# Patient Record
Sex: MALE | Race: WHITE | NOT HISPANIC OR LATINO
[De-identification: names, ages, dates, MRNs, and addresses within clinical notes are randomized per-mention and may not be internally consistent; named-entity substitution may affect disease eponyms.]

---

## 2017-02-27 ENCOUNTER — MEDICATION RENEWAL (OUTPATIENT)
Age: 62
End: 2017-02-27

## 2017-03-01 ENCOUNTER — NON-APPOINTMENT (OUTPATIENT)
Age: 62
End: 2017-03-01

## 2017-03-01 ENCOUNTER — APPOINTMENT (OUTPATIENT)
Dept: CARDIOLOGY | Facility: CLINIC | Age: 62
End: 2017-03-01

## 2017-03-01 VITALS
WEIGHT: 236 LBS | DIASTOLIC BLOOD PRESSURE: 84 MMHG | TEMPERATURE: 98.3 F | HEIGHT: 70 IN | HEART RATE: 57 BPM | OXYGEN SATURATION: 95 % | SYSTOLIC BLOOD PRESSURE: 138 MMHG | BODY MASS INDEX: 33.79 KG/M2

## 2017-03-06 ENCOUNTER — APPOINTMENT (OUTPATIENT)
Dept: CARDIOLOGY | Facility: CLINIC | Age: 62
End: 2017-03-06

## 2017-03-06 ENCOUNTER — MED ADMIN CHARGE (OUTPATIENT)
Age: 62
End: 2017-03-06

## 2017-03-06 RX ORDER — REGADENOSON 0.08 MG/ML
0.4 INJECTION, SOLUTION INTRAVENOUS
Qty: 1 | Refills: 0 | Status: COMPLETED | OUTPATIENT
Start: 2017-03-06

## 2017-03-06 RX ADMIN — REGADENOSON 0.4 MG/5ML: 0.08 INJECTION, SOLUTION INTRAVENOUS at 00:00

## 2017-03-07 ENCOUNTER — RESULT REVIEW (OUTPATIENT)
Age: 62
End: 2017-03-07

## 2017-03-20 ENCOUNTER — OUTPATIENT (OUTPATIENT)
Dept: OUTPATIENT SERVICES | Facility: HOSPITAL | Age: 62
LOS: 1 days | End: 2017-03-20
Payer: COMMERCIAL

## 2017-03-20 VITALS
HEIGHT: 70 IN | OXYGEN SATURATION: 96 % | HEART RATE: 53 BPM | WEIGHT: 235.01 LBS | DIASTOLIC BLOOD PRESSURE: 81 MMHG | TEMPERATURE: 98 F | SYSTOLIC BLOOD PRESSURE: 149 MMHG | RESPIRATION RATE: 16 BRPM

## 2017-03-20 DIAGNOSIS — Z95.5 PRESENCE OF CORONARY ANGIOPLASTY IMPLANT AND GRAFT: Chronic | ICD-10-CM

## 2017-03-20 DIAGNOSIS — C43.9 MALIGNANT MELANOMA OF SKIN, UNSPECIFIED: Chronic | ICD-10-CM

## 2017-03-20 DIAGNOSIS — R94.39 ABNORMAL RESULT OF OTHER CARDIOVASCULAR FUNCTION STUDY: ICD-10-CM

## 2017-03-20 LAB
ALBUMIN SERPL ELPH-MCNC: 4 G/DL — SIGNIFICANT CHANGE UP (ref 3.3–5)
ALP SERPL-CCNC: 104 U/L — SIGNIFICANT CHANGE UP (ref 40–120)
ALT FLD-CCNC: 40 U/L RC — SIGNIFICANT CHANGE UP (ref 10–45)
ANION GAP SERPL CALC-SCNC: 16 MMOL/L — SIGNIFICANT CHANGE UP (ref 5–17)
AST SERPL-CCNC: 21 U/L — SIGNIFICANT CHANGE UP (ref 10–40)
BILIRUB SERPL-MCNC: 0.7 MG/DL — SIGNIFICANT CHANGE UP (ref 0.2–1.2)
BUN SERPL-MCNC: 18 MG/DL — SIGNIFICANT CHANGE UP (ref 7–23)
CALCIUM SERPL-MCNC: 9 MG/DL — SIGNIFICANT CHANGE UP (ref 8.4–10.5)
CHLORIDE SERPL-SCNC: 105 MMOL/L — SIGNIFICANT CHANGE UP (ref 96–108)
CO2 SERPL-SCNC: 24 MMOL/L — SIGNIFICANT CHANGE UP (ref 22–31)
CREAT SERPL-MCNC: 1.03 MG/DL — SIGNIFICANT CHANGE UP (ref 0.5–1.3)
GLUCOSE SERPL-MCNC: 111 MG/DL — HIGH (ref 70–99)
HCT VFR BLD CALC: 45.1 % — SIGNIFICANT CHANGE UP (ref 39–50)
HGB BLD-MCNC: 15.3 G/DL — SIGNIFICANT CHANGE UP (ref 13–17)
MCHC RBC-ENTMCNC: 29.9 PG — SIGNIFICANT CHANGE UP (ref 27–34)
MCHC RBC-ENTMCNC: 34 GM/DL — SIGNIFICANT CHANGE UP (ref 32–36)
MCV RBC AUTO: 87.8 FL — SIGNIFICANT CHANGE UP (ref 80–100)
PLATELET # BLD AUTO: 151 K/UL — SIGNIFICANT CHANGE UP (ref 150–400)
POTASSIUM SERPL-MCNC: 4.4 MMOL/L — SIGNIFICANT CHANGE UP (ref 3.5–5.3)
POTASSIUM SERPL-SCNC: 4.4 MMOL/L — SIGNIFICANT CHANGE UP (ref 3.5–5.3)
PROT SERPL-MCNC: 6.7 G/DL — SIGNIFICANT CHANGE UP (ref 6–8.3)
RBC # BLD: 5.14 M/UL — SIGNIFICANT CHANGE UP (ref 4.2–5.8)
RBC # FLD: 12.3 % — SIGNIFICANT CHANGE UP (ref 10.3–14.5)
SODIUM SERPL-SCNC: 145 MMOL/L — SIGNIFICANT CHANGE UP (ref 135–145)
WBC # BLD: 6 K/UL — SIGNIFICANT CHANGE UP (ref 3.8–10.5)
WBC # FLD AUTO: 6 K/UL — SIGNIFICANT CHANGE UP (ref 3.8–10.5)

## 2017-03-20 PROCEDURE — 93005 ELECTROCARDIOGRAM TRACING: CPT

## 2017-03-20 PROCEDURE — 93458 L HRT ARTERY/VENTRICLE ANGIO: CPT | Mod: 26

## 2017-03-20 PROCEDURE — 93458 L HRT ARTERY/VENTRICLE ANGIO: CPT

## 2017-03-20 PROCEDURE — C1887: CPT

## 2017-03-20 PROCEDURE — 80053 COMPREHEN METABOLIC PANEL: CPT

## 2017-03-20 PROCEDURE — 93010 ELECTROCARDIOGRAM REPORT: CPT

## 2017-03-20 PROCEDURE — 85027 COMPLETE CBC AUTOMATED: CPT

## 2017-03-20 PROCEDURE — C1769: CPT

## 2017-03-20 PROCEDURE — C1894: CPT

## 2017-03-20 RX ORDER — SODIUM CHLORIDE 9 MG/ML
3 INJECTION INTRAMUSCULAR; INTRAVENOUS; SUBCUTANEOUS EVERY 8 HOURS
Qty: 0 | Refills: 0 | Status: DISCONTINUED | OUTPATIENT
Start: 2017-03-20 | End: 2017-04-04

## 2017-03-20 RX ORDER — FAMOTIDINE 10 MG/ML
20 INJECTION INTRAVENOUS ONCE
Qty: 0 | Refills: 0 | Status: COMPLETED | OUTPATIENT
Start: 2017-03-20 | End: 2017-03-20

## 2017-03-20 RX ORDER — DIPHENHYDRAMINE HCL 50 MG
50 CAPSULE ORAL ONCE
Qty: 0 | Refills: 0 | Status: COMPLETED | OUTPATIENT
Start: 2017-03-20 | End: 2017-03-20

## 2017-03-20 RX ORDER — HYDROCORTISONE 20 MG
200 TABLET ORAL ONCE
Qty: 0 | Refills: 0 | Status: COMPLETED | OUTPATIENT
Start: 2017-03-20 | End: 2017-03-20

## 2017-03-20 RX ADMIN — Medication 50 MILLIGRAM(S): at 08:24

## 2017-03-20 RX ADMIN — Medication 200 MILLIGRAM(S): at 08:24

## 2017-03-20 RX ADMIN — FAMOTIDINE 20 MILLIGRAM(S): 10 INJECTION INTRAVENOUS at 08:24

## 2017-03-20 NOTE — H&P CARDIOLOGY - PSH
History of coronary artery stent placement  2007, 2012, 2013  Melanoma  removal  S/P angioplasty with stent

## 2017-03-20 NOTE — H&P CARDIOLOGY - HISTORY OF PRESENT ILLNESS
61 yr old male with PMH of asthma in childhood, MI 2008, CAD with 6 stents, HTN, HLD, melanoma, 61 yr old male with PMH of asthma in childhood, MI 2008, CAD with 6 prior  stents, HTN, HLD, melanoma(excised) presents today for cardiac cath. Patient reports exertional angina (bilateral arm pain - typical angina) associated with CRUZ and fatigue for the past 6 months. Evaluated by Dr Yarbrough and pharm ST was done which revealed abnormality (as per pt).

## 2017-03-20 NOTE — H&P CARDIOLOGY - PMH
Ankle fracture    Asthma    H/O heart artery stent  2008, 2012  HTN (hypertension)    Hypercholesteremia    Melanoma    MI (myocardial infarction)  2008

## 2017-03-28 ENCOUNTER — RX RENEWAL (OUTPATIENT)
Age: 62
End: 2017-03-28

## 2017-04-06 ENCOUNTER — APPOINTMENT (OUTPATIENT)
Dept: CARDIOLOGY | Facility: CLINIC | Age: 62
End: 2017-04-06

## 2017-04-06 ENCOUNTER — NON-APPOINTMENT (OUTPATIENT)
Age: 62
End: 2017-04-06

## 2017-04-06 VITALS
SYSTOLIC BLOOD PRESSURE: 157 MMHG | HEART RATE: 70 BPM | HEIGHT: 70 IN | BODY MASS INDEX: 33.21 KG/M2 | WEIGHT: 232 LBS | OXYGEN SATURATION: 96 % | DIASTOLIC BLOOD PRESSURE: 84 MMHG

## 2017-04-06 DIAGNOSIS — J45.909 UNSPECIFIED ASTHMA, UNCOMPLICATED: ICD-10-CM

## 2017-05-02 ENCOUNTER — RESULT REVIEW (OUTPATIENT)
Age: 62
End: 2017-05-02

## 2017-05-02 LAB
ALBUMIN SERPL ELPH-MCNC: 3.9 G/DL
ALP BLD-CCNC: 100 U/L
ALT SERPL-CCNC: 28 U/L
ANION GAP SERPL CALC-SCNC: 14 MMOL/L
AST SERPL-CCNC: 16 U/L
BASOPHILS # BLD AUTO: 0.01 K/UL
BASOPHILS NFR BLD AUTO: 0.2 %
BILIRUB SERPL-MCNC: 0.7 MG/DL
BUN SERPL-MCNC: 20 MG/DL
CALCIUM SERPL-MCNC: 9 MG/DL
CHLORIDE SERPL-SCNC: 105 MMOL/L
CHOLEST SERPL-MCNC: 151 MG/DL
CHOLEST/HDLC SERPL: 4.4 RATIO
CO2 SERPL-SCNC: 25 MMOL/L
CREAT SERPL-MCNC: 1.26 MG/DL
EOSINOPHIL # BLD AUTO: 0.15 K/UL
EOSINOPHIL NFR BLD AUTO: 2.3 %
GLUCOSE SERPL-MCNC: 106 MG/DL
HBA1C MFR BLD HPLC: 5.9 %
HCT VFR BLD CALC: 45.2 %
HDLC SERPL-MCNC: 34 MG/DL
HGB BLD-MCNC: 14.6 G/DL
IMM GRANULOCYTES NFR BLD AUTO: 0.2 %
LDLC SERPL CALC-MCNC: 79 MG/DL
LYMPHOCYTES # BLD AUTO: 1.53 K/UL
LYMPHOCYTES NFR BLD AUTO: 23.9 %
MAN DIFF?: NORMAL
MCHC RBC-ENTMCNC: 28.9 PG
MCHC RBC-ENTMCNC: 32.3 GM/DL
MCV RBC AUTO: 89.5 FL
MONOCYTES # BLD AUTO: 0.47 K/UL
MONOCYTES NFR BLD AUTO: 7.3 %
NEUTROPHILS # BLD AUTO: 4.24 K/UL
NEUTROPHILS NFR BLD AUTO: 66.1 %
PLATELET # BLD AUTO: 176 K/UL
POTASSIUM SERPL-SCNC: 4.6 MMOL/L
PROT SERPL-MCNC: 6.4 G/DL
PSA FREE FLD-MCNC: 17.5 %
PSA FREE SERPL-MCNC: 0.56 NG/ML
PSA SERPL-MCNC: 3.2 NG/ML
RBC # BLD: 5.05 M/UL
RBC # FLD: 13.7 %
SODIUM SERPL-SCNC: 144 MMOL/L
T4 SERPL-MCNC: 7 UG/DL
TRIGL SERPL-MCNC: 192 MG/DL
TSH SERPL-ACNC: 1.2 UIU/ML
WBC # FLD AUTO: 6.41 K/UL

## 2017-05-04 ENCOUNTER — NON-APPOINTMENT (OUTPATIENT)
Age: 62
End: 2017-05-04

## 2017-05-04 ENCOUNTER — APPOINTMENT (OUTPATIENT)
Dept: CARDIOLOGY | Facility: CLINIC | Age: 62
End: 2017-05-04

## 2017-05-04 VITALS
BODY MASS INDEX: 32.78 KG/M2 | SYSTOLIC BLOOD PRESSURE: 127 MMHG | HEIGHT: 70 IN | OXYGEN SATURATION: 93 % | DIASTOLIC BLOOD PRESSURE: 82 MMHG | HEART RATE: 56 BPM | TEMPERATURE: 97.9 F | WEIGHT: 229 LBS

## 2017-05-04 VITALS — HEART RATE: 55 BPM | OXYGEN SATURATION: 98 %

## 2017-08-03 ENCOUNTER — NON-APPOINTMENT (OUTPATIENT)
Age: 62
End: 2017-08-03

## 2017-08-03 ENCOUNTER — APPOINTMENT (OUTPATIENT)
Dept: CARDIOLOGY | Facility: CLINIC | Age: 62
End: 2017-08-03
Payer: COMMERCIAL

## 2017-08-03 VITALS
DIASTOLIC BLOOD PRESSURE: 82 MMHG | HEIGHT: 70 IN | HEART RATE: 82 BPM | BODY MASS INDEX: 32.78 KG/M2 | OXYGEN SATURATION: 95 % | SYSTOLIC BLOOD PRESSURE: 110 MMHG | WEIGHT: 229 LBS

## 2017-08-03 PROCEDURE — 99214 OFFICE O/P EST MOD 30 MIN: CPT

## 2017-08-03 PROCEDURE — 93000 ELECTROCARDIOGRAM COMPLETE: CPT

## 2017-08-11 DIAGNOSIS — R68.82 DECREASED LIBIDO: ICD-10-CM

## 2017-08-11 DIAGNOSIS — M17.10 UNILATERAL PRIMARY OSTEOARTHRITIS, UNSPECIFIED KNEE: ICD-10-CM

## 2017-08-12 ENCOUNTER — RX RENEWAL (OUTPATIENT)
Age: 62
End: 2017-08-12

## 2017-08-14 ENCOUNTER — APPOINTMENT (OUTPATIENT)
Dept: RADIOLOGY | Facility: CLINIC | Age: 62
End: 2017-08-14
Payer: COMMERCIAL

## 2017-08-14 ENCOUNTER — LABORATORY RESULT (OUTPATIENT)
Age: 62
End: 2017-08-14

## 2017-08-14 ENCOUNTER — OUTPATIENT (OUTPATIENT)
Dept: OUTPATIENT SERVICES | Facility: HOSPITAL | Age: 62
LOS: 1 days | End: 2017-08-14
Payer: COMMERCIAL

## 2017-08-14 DIAGNOSIS — Z95.5 PRESENCE OF CORONARY ANGIOPLASTY IMPLANT AND GRAFT: Chronic | ICD-10-CM

## 2017-08-14 DIAGNOSIS — C43.9 MALIGNANT MELANOMA OF SKIN, UNSPECIFIED: Chronic | ICD-10-CM

## 2017-08-14 DIAGNOSIS — Z00.8 ENCOUNTER FOR OTHER GENERAL EXAMINATION: ICD-10-CM

## 2017-08-14 PROCEDURE — 71046 X-RAY EXAM CHEST 2 VIEWS: CPT

## 2017-08-14 PROCEDURE — 71020: CPT | Mod: 26

## 2017-08-15 LAB
ALBUMIN SERPL ELPH-MCNC: 4 G/DL
ALP BLD-CCNC: 97 U/L
ALT SERPL-CCNC: 32 U/L
ANION GAP SERPL CALC-SCNC: 15 MMOL/L
APTT BLD: 28.9 SEC
AST SERPL-CCNC: 18 U/L
BASOPHILS # BLD AUTO: 0.03 K/UL
BASOPHILS NFR BLD AUTO: 0.4 %
BILIRUB SERPL-MCNC: 0.7 MG/DL
BUN SERPL-MCNC: 17 MG/DL
CALCIUM SERPL-MCNC: 9.1 MG/DL
CHLORIDE SERPL-SCNC: 104 MMOL/L
CO2 SERPL-SCNC: 22 MMOL/L
CREAT SERPL-MCNC: 1.09 MG/DL
EOSINOPHIL # BLD AUTO: 0.15 K/UL
EOSINOPHIL NFR BLD AUTO: 2.2 %
GLUCOSE SERPL-MCNC: 109 MG/DL
HBA1C MFR BLD HPLC: 5.6 %
HCT VFR BLD CALC: 45.3 %
HGB BLD-MCNC: 15 G/DL
IMM GRANULOCYTES NFR BLD AUTO: 0.1 %
INR PPP: 0.94 RATIO
LYMPHOCYTES # BLD AUTO: 1.65 K/UL
LYMPHOCYTES NFR BLD AUTO: 24.6 %
MAN DIFF?: NORMAL
MCHC RBC-ENTMCNC: 29.6 PG
MCHC RBC-ENTMCNC: 33.1 GM/DL
MCV RBC AUTO: 89.5 FL
MONOCYTES # BLD AUTO: 0.41 K/UL
MONOCYTES NFR BLD AUTO: 6.1 %
NEUTROPHILS # BLD AUTO: 4.47 K/UL
NEUTROPHILS NFR BLD AUTO: 66.6 %
PLATELET # BLD AUTO: 183 K/UL
POTASSIUM SERPL-SCNC: 4.5 MMOL/L
PROT SERPL-MCNC: 6.7 G/DL
PT BLD: 10.6 SEC
RBC # BLD: 5.06 M/UL
RBC # FLD: 13.8 %
SODIUM SERPL-SCNC: 141 MMOL/L
WBC # FLD AUTO: 6.72 K/UL

## 2017-10-02 ENCOUNTER — RX RENEWAL (OUTPATIENT)
Age: 62
End: 2017-10-02

## 2017-10-14 ENCOUNTER — RX RENEWAL (OUTPATIENT)
Age: 62
End: 2017-10-14

## 2018-02-02 ENCOUNTER — RX RENEWAL (OUTPATIENT)
Age: 63
End: 2018-02-02

## 2018-02-08 ENCOUNTER — APPOINTMENT (OUTPATIENT)
Dept: CARDIOLOGY | Facility: CLINIC | Age: 63
End: 2018-02-08
Payer: COMMERCIAL

## 2018-02-08 ENCOUNTER — NON-APPOINTMENT (OUTPATIENT)
Age: 63
End: 2018-02-08

## 2018-02-08 VITALS
OXYGEN SATURATION: 95 % | SYSTOLIC BLOOD PRESSURE: 116 MMHG | HEART RATE: 62 BPM | DIASTOLIC BLOOD PRESSURE: 80 MMHG | HEIGHT: 70 IN | BODY MASS INDEX: 32.93 KG/M2 | WEIGHT: 230 LBS

## 2018-02-08 PROCEDURE — 99214 OFFICE O/P EST MOD 30 MIN: CPT

## 2018-02-08 PROCEDURE — 93000 ELECTROCARDIOGRAM COMPLETE: CPT

## 2018-02-28 ENCOUNTER — LABORATORY RESULT (OUTPATIENT)
Age: 63
End: 2018-02-28

## 2018-03-01 LAB
ALBUMIN SERPL ELPH-MCNC: 4.1 G/DL
ALP BLD-CCNC: 110 U/L
ALT SERPL-CCNC: 25 U/L
ANION GAP SERPL CALC-SCNC: 14 MMOL/L
AST SERPL-CCNC: 19 U/L
BASOPHILS # BLD AUTO: 0.03 K/UL
BASOPHILS NFR BLD AUTO: 0.6 %
BILIRUB SERPL-MCNC: 0.6 MG/DL
BUN SERPL-MCNC: 19 MG/DL
CALCIUM SERPL-MCNC: 9.6 MG/DL
CHLORIDE SERPL-SCNC: 105 MMOL/L
CHOLEST SERPL-MCNC: 161 MG/DL
CHOLEST/HDLC SERPL: 5.2 RATIO
CO2 SERPL-SCNC: 22 MMOL/L
CREAT SERPL-MCNC: 1.23 MG/DL
EOSINOPHIL # BLD AUTO: 0.21 K/UL
EOSINOPHIL NFR BLD AUTO: 3.9 %
FT4I SERPL CALC-MCNC: 7 INDEX
GLUCOSE SERPL-MCNC: 109 MG/DL
HBA1C MFR BLD HPLC: 5.6 %
HCT VFR BLD CALC: 45.2 %
HDLC SERPL-MCNC: 31 MG/DL
HGB BLD-MCNC: 15 G/DL
IMM GRANULOCYTES NFR BLD AUTO: 0.2 %
LDLC SERPL CALC-MCNC: 83 MG/DL
LYMPHOCYTES # BLD AUTO: 1.56 K/UL
LYMPHOCYTES NFR BLD AUTO: 29.3 %
MAN DIFF?: NORMAL
MCHC RBC-ENTMCNC: 29.8 PG
MCHC RBC-ENTMCNC: 33.2 GM/DL
MCV RBC AUTO: 89.7 FL
MONOCYTES # BLD AUTO: 0.29 K/UL
MONOCYTES NFR BLD AUTO: 5.4 %
NEUTROPHILS # BLD AUTO: 3.23 K/UL
NEUTROPHILS NFR BLD AUTO: 60.6 %
PLATELET # BLD AUTO: 175 K/UL
POTASSIUM SERPL-SCNC: 4.4 MMOL/L
PROT SERPL-MCNC: 6.6 G/DL
PSA FREE FLD-MCNC: 17
PSA FREE SERPL-MCNC: 0.9 NG/ML
PSA SERPL-MCNC: 5.28 NG/ML
RBC # BLD: 5.04 M/UL
RBC # FLD: 13.5 %
SODIUM SERPL-SCNC: 141 MMOL/L
T3RU NFR SERPL: 1.07 INDEX
T4 FREE SERPL-MCNC: 1.2 NG/DL
T4 SERPL-MCNC: 7.5 UG/DL
TRIGL SERPL-MCNC: 233 MG/DL
TSH SERPL-ACNC: 1.78 UIU/ML
WBC # FLD AUTO: 5.33 K/UL

## 2018-03-11 ENCOUNTER — RX RENEWAL (OUTPATIENT)
Age: 63
End: 2018-03-11

## 2018-03-31 ENCOUNTER — RX RENEWAL (OUTPATIENT)
Age: 63
End: 2018-03-31

## 2018-05-03 ENCOUNTER — RX RENEWAL (OUTPATIENT)
Age: 63
End: 2018-05-03

## 2018-05-09 ENCOUNTER — RX RENEWAL (OUTPATIENT)
Age: 63
End: 2018-05-09

## 2018-07-30 ENCOUNTER — APPOINTMENT (OUTPATIENT)
Dept: CARDIOLOGY | Facility: CLINIC | Age: 63
End: 2018-07-30

## 2018-08-06 ENCOUNTER — RX RENEWAL (OUTPATIENT)
Age: 63
End: 2018-08-06

## 2018-09-20 ENCOUNTER — MEDICATION RENEWAL (OUTPATIENT)
Age: 63
End: 2018-09-20

## 2018-09-27 ENCOUNTER — NON-APPOINTMENT (OUTPATIENT)
Age: 63
End: 2018-09-27

## 2018-09-27 ENCOUNTER — APPOINTMENT (OUTPATIENT)
Dept: CARDIOLOGY | Facility: CLINIC | Age: 63
End: 2018-09-27
Payer: COMMERCIAL

## 2018-09-27 VITALS
TEMPERATURE: 98.8 F | HEART RATE: 61 BPM | OXYGEN SATURATION: 94 % | SYSTOLIC BLOOD PRESSURE: 145 MMHG | WEIGHT: 230 LBS | HEIGHT: 70 IN | BODY MASS INDEX: 32.93 KG/M2 | DIASTOLIC BLOOD PRESSURE: 79 MMHG

## 2018-09-27 PROCEDURE — 99215 OFFICE O/P EST HI 40 MIN: CPT

## 2018-09-27 PROCEDURE — 93000 ELECTROCARDIOGRAM COMPLETE: CPT

## 2018-09-27 RX ORDER — SULFAMETHOXAZOLE AND TRIMETHOPRIM 800; 160 MG/1; MG/1
800-160 TABLET ORAL TWICE DAILY
Qty: 14 | Refills: 0 | Status: DISCONTINUED | COMMUNITY
Start: 2018-09-20 | End: 2018-09-27

## 2018-10-01 ENCOUNTER — LABORATORY RESULT (OUTPATIENT)
Age: 63
End: 2018-10-01

## 2018-10-02 ENCOUNTER — APPOINTMENT (OUTPATIENT)
Dept: CARDIOLOGY | Facility: CLINIC | Age: 63
End: 2018-10-02
Payer: COMMERCIAL

## 2018-10-02 LAB
ALBUMIN SERPL ELPH-MCNC: 4 G/DL
ALP BLD-CCNC: 115 U/L
ALT SERPL-CCNC: 24 U/L
ANION GAP SERPL CALC-SCNC: 15 MMOL/L
AST SERPL-CCNC: 21 U/L
BASOPHILS # BLD AUTO: 0.03 K/UL
BASOPHILS NFR BLD AUTO: 0.5 %
BILIRUB SERPL-MCNC: 0.7 MG/DL
BUN SERPL-MCNC: 22 MG/DL
CALCIUM SERPL-MCNC: 9.4 MG/DL
CHLORIDE SERPL-SCNC: 106 MMOL/L
CHOLEST SERPL-MCNC: 151 MG/DL
CHOLEST/HDLC SERPL: 5.4 RATIO
CO2 SERPL-SCNC: 20 MMOL/L
CREAT SERPL-MCNC: 1.12 MG/DL
EOSINOPHIL # BLD AUTO: 0.13 K/UL
EOSINOPHIL NFR BLD AUTO: 2 %
FT4I SERPL CALC-MCNC: 6.2 INDEX
GLUCOSE SERPL-MCNC: 107 MG/DL
HBA1C MFR BLD HPLC: 5.6 %
HCT VFR BLD CALC: 45.3 %
HDLC SERPL-MCNC: 28 MG/DL
HGB BLD-MCNC: 14.6 G/DL
IMM GRANULOCYTES NFR BLD AUTO: 0.3 %
LDLC SERPL CALC-MCNC: 74 MG/DL
LYMPHOCYTES # BLD AUTO: 1.94 K/UL
LYMPHOCYTES NFR BLD AUTO: 29.5 %
MAN DIFF?: NORMAL
MCHC RBC-ENTMCNC: 29 PG
MCHC RBC-ENTMCNC: 32.2 GM/DL
MCV RBC AUTO: 90.1 FL
MONOCYTES # BLD AUTO: 0.4 K/UL
MONOCYTES NFR BLD AUTO: 6.1 %
NEUTROPHILS # BLD AUTO: 4.06 K/UL
NEUTROPHILS NFR BLD AUTO: 61.6 %
PLATELET # BLD AUTO: 193 K/UL
POTASSIUM SERPL-SCNC: 4.3 MMOL/L
PROT SERPL-MCNC: 7 G/DL
PSA FREE FLD-MCNC: 20.6
PSA FREE SERPL-MCNC: 0.72 NG/ML
PSA SERPL-MCNC: 3.5 NG/ML
RBC # BLD: 5.03 M/UL
RBC # FLD: 13.7 %
SODIUM SERPL-SCNC: 141 MMOL/L
T3RU NFR SERPL: 1.1 INDEX
T4 FREE SERPL-MCNC: 1.2 NG/DL
T4 SERPL-MCNC: 6.8 UG/DL
TRIGL SERPL-MCNC: 244 MG/DL
TSH SERPL-ACNC: 2.28 UIU/ML
WBC # FLD AUTO: 6.58 K/UL

## 2018-10-02 PROCEDURE — A9500: CPT

## 2018-10-02 PROCEDURE — 93015 CV STRESS TEST SUPVJ I&R: CPT

## 2018-10-02 PROCEDURE — 78452 HT MUSCLE IMAGE SPECT MULT: CPT

## 2018-10-18 ENCOUNTER — MOBILE ON CALL (OUTPATIENT)
Age: 63
End: 2018-10-18

## 2018-10-30 ENCOUNTER — TRANSCRIPTION ENCOUNTER (OUTPATIENT)
Age: 63
End: 2018-10-30

## 2018-10-30 ENCOUNTER — INPATIENT (INPATIENT)
Facility: HOSPITAL | Age: 63
LOS: 0 days | Discharge: HOME | DRG: 247 | End: 2018-10-31
Attending: INTERNAL MEDICINE | Admitting: INTERNAL MEDICINE
Payer: COMMERCIAL

## 2018-10-30 VITALS
WEIGHT: 229.94 LBS | OXYGEN SATURATION: 95 % | DIASTOLIC BLOOD PRESSURE: 83 MMHG | HEART RATE: 56 BPM | RESPIRATION RATE: 15 BRPM | HEIGHT: 70 IN | SYSTOLIC BLOOD PRESSURE: 171 MMHG

## 2018-10-30 DIAGNOSIS — Z95.5 PRESENCE OF CORONARY ANGIOPLASTY IMPLANT AND GRAFT: Chronic | ICD-10-CM

## 2018-10-30 DIAGNOSIS — I25.10 ATHEROSCLEROTIC HEART DISEASE OF NATIVE CORONARY ARTERY WITHOUT ANGINA PECTORIS: ICD-10-CM

## 2018-10-30 DIAGNOSIS — C43.9 MALIGNANT MELANOMA OF SKIN, UNSPECIFIED: Chronic | ICD-10-CM

## 2018-10-30 LAB
ALBUMIN SERPL ELPH-MCNC: 3.9 G/DL — SIGNIFICANT CHANGE UP (ref 3.3–5)
ALP SERPL-CCNC: 116 U/L — SIGNIFICANT CHANGE UP (ref 40–120)
ALT FLD-CCNC: 28 U/L — SIGNIFICANT CHANGE UP (ref 10–45)
ANION GAP SERPL CALC-SCNC: 12 MMOL/L — SIGNIFICANT CHANGE UP (ref 5–17)
AST SERPL-CCNC: 16 U/L — SIGNIFICANT CHANGE UP (ref 10–40)
BILIRUB SERPL-MCNC: 0.5 MG/DL — SIGNIFICANT CHANGE UP (ref 0.2–1.2)
BUN SERPL-MCNC: 19 MG/DL — SIGNIFICANT CHANGE UP (ref 7–23)
CALCIUM SERPL-MCNC: 9.4 MG/DL — SIGNIFICANT CHANGE UP (ref 8.4–10.5)
CHLORIDE SERPL-SCNC: 106 MMOL/L — SIGNIFICANT CHANGE UP (ref 96–108)
CO2 SERPL-SCNC: 23 MMOL/L — SIGNIFICANT CHANGE UP (ref 22–31)
CREAT SERPL-MCNC: 1.08 MG/DL — SIGNIFICANT CHANGE UP (ref 0.5–1.3)
GLUCOSE SERPL-MCNC: 107 MG/DL — HIGH (ref 70–99)
HCT VFR BLD CALC: 45.2 % — SIGNIFICANT CHANGE UP (ref 39–50)
HGB BLD-MCNC: 15.6 G/DL — SIGNIFICANT CHANGE UP (ref 13–17)
MCHC RBC-ENTMCNC: 30.2 PG — SIGNIFICANT CHANGE UP (ref 27–34)
MCHC RBC-ENTMCNC: 34.5 GM/DL — SIGNIFICANT CHANGE UP (ref 32–36)
MCV RBC AUTO: 87.6 FL — SIGNIFICANT CHANGE UP (ref 80–100)
PLATELET # BLD AUTO: 171 K/UL — SIGNIFICANT CHANGE UP (ref 150–400)
POTASSIUM SERPL-MCNC: 4.2 MMOL/L — SIGNIFICANT CHANGE UP (ref 3.5–5.3)
POTASSIUM SERPL-SCNC: 4.2 MMOL/L — SIGNIFICANT CHANGE UP (ref 3.5–5.3)
PROT SERPL-MCNC: 7 G/DL — SIGNIFICANT CHANGE UP (ref 6–8.3)
RBC # BLD: 5.15 M/UL — SIGNIFICANT CHANGE UP (ref 4.2–5.8)
RBC # FLD: 12.7 % — SIGNIFICANT CHANGE UP (ref 10.3–14.5)
SODIUM SERPL-SCNC: 141 MMOL/L — SIGNIFICANT CHANGE UP (ref 135–145)
WBC # BLD: 7.2 K/UL — SIGNIFICANT CHANGE UP (ref 3.8–10.5)
WBC # FLD AUTO: 7.2 K/UL — SIGNIFICANT CHANGE UP (ref 3.8–10.5)

## 2018-10-30 PROCEDURE — 93458 L HRT ARTERY/VENTRICLE ANGIO: CPT | Mod: 26,59

## 2018-10-30 PROCEDURE — 93571 IV DOP VEL&/PRESS C FLO 1ST: CPT | Mod: 26,52

## 2018-10-30 PROCEDURE — 93010 ELECTROCARDIOGRAM REPORT: CPT | Mod: 77

## 2018-10-30 PROCEDURE — 93010 ELECTROCARDIOGRAM REPORT: CPT

## 2018-10-30 PROCEDURE — 92928 PRQ TCAT PLMT NTRAC ST 1 LES: CPT | Mod: RC

## 2018-10-30 RX ORDER — RANOLAZINE 500 MG/1
500 TABLET, FILM COATED, EXTENDED RELEASE ORAL
Qty: 0 | Refills: 0 | Status: DISCONTINUED | OUTPATIENT
Start: 2018-10-30 | End: 2018-10-31

## 2018-10-30 RX ORDER — LISINOPRIL 2.5 MG/1
10 TABLET ORAL DAILY
Qty: 0 | Refills: 0 | Status: DISCONTINUED | OUTPATIENT
Start: 2018-10-30 | End: 2018-10-31

## 2018-10-30 RX ORDER — ATORVASTATIN CALCIUM 80 MG/1
80 TABLET, FILM COATED ORAL AT BEDTIME
Qty: 0 | Refills: 0 | Status: DISCONTINUED | OUTPATIENT
Start: 2018-10-30 | End: 2018-10-31

## 2018-10-30 RX ORDER — BISOPROLOL FUMARATE 10 MG/1
10 TABLET, FILM COATED ORAL DAILY
Qty: 0 | Refills: 0 | Status: DISCONTINUED | OUTPATIENT
Start: 2018-10-30 | End: 2018-10-31

## 2018-10-30 RX ORDER — CHOLECALCIFEROL (VITAMIN D3) 125 MCG
2000 CAPSULE ORAL DAILY
Qty: 0 | Refills: 0 | Status: DISCONTINUED | OUTPATIENT
Start: 2018-10-30 | End: 2018-10-31

## 2018-10-30 RX ORDER — PRASUGREL 5 MG/1
10 TABLET, FILM COATED ORAL DAILY
Qty: 0 | Refills: 0 | Status: DISCONTINUED | OUTPATIENT
Start: 2018-10-30 | End: 2018-10-31

## 2018-10-30 RX ORDER — ASPIRIN/CALCIUM CARB/MAGNESIUM 324 MG
81 TABLET ORAL DAILY
Qty: 0 | Refills: 0 | Status: DISCONTINUED | OUTPATIENT
Start: 2018-10-30 | End: 2018-10-31

## 2018-10-30 RX ADMIN — RANOLAZINE 500 MILLIGRAM(S): 500 TABLET, FILM COATED, EXTENDED RELEASE ORAL at 17:42

## 2018-10-30 NOTE — H&P CARDIOLOGY - HISTORY OF PRESENT ILLNESS
64 y/o male with pmh of HTN, HLD, CAD s/p MI 2008 s/p PCI/stent mLAD (10/2012); mLAD stent and D1 stent (12/2013), RPDA stent 4/2015; Diagnostic cath 3/2017 mLAD 30% at prior stent, D1 10% at prior stent, pCX , pRCA 40% at prior stent, asthma in childhood seen by Dr. Colón, Cardiologist for extreme fatigue for the last 8 months but worsening over the past 2 months with no reports of cp, sob or palpitations.  Pt states that this is the "precursor" to his anginal symptoms which requires him to take a break after working around the house (possible anginal equivalent).  Stress test reveals LVEF 52% with medium sized severe defects in prox to mid inferior and prox to mid  inferolateral and basal inferoseptal walls that are predominantly fixed consistent with infarct.  A small segment of the basal inferolateral wall is partially reversible consistent with infarct and moderate luh-infarct ischemia.  Akinesis in prox to mid inferior and basal inferoseptal walls and hypokinesis of the prox inferolateral wall.  Pt denies symptoms presently and presents for cardiac cath for further evaluation of his CAD based on his symptoms.      Of note: Pt has listed on Allergies IV dye however reports never being premedicated prior to a CT scan and on his many Cardiac caths has not been medicated prior when researched his charts and on his cath reports.  Pt has an allergy to Shrimp only.

## 2018-10-30 NOTE — H&P CARDIOLOGY - REVIEW OF SYSTEMS
CONSTITUTIONAL: No weakness, fevers or chills but reports extreme fatigue  EYES/ENT: No visual changes;  No vertigo or throat pain   NECK: No pain or stiffness  GASTROINTESTINAL: No abdominal or epigastric pain. No nausea, vomiting, or hematemesis; No diarrhea or constipation. No melena or hematochezia.  GENITOURINARY: No dysuria, frequency or hematuria  SKIN: No itching, burning, rashes, or lesions   PSYCH: No change in mood  All other review of systems is negative unless indicated above.

## 2018-10-30 NOTE — DISCHARGE NOTE ADULT - MEDICATION SUMMARY - MEDICATIONS TO TAKE
I will START or STAY ON the medications listed below when I get home from the hospital:    Aspirin Low Dose 81 mg oral tablet  -- 1 tab(s) by mouth once a day  -- Indication: For heart    lisinopril 10 mg oral tablet  -- 1 tab(s) by mouth once a day  -- Indication: For hypertension    Ranexa 500 mg oral tablet, extended release  -- 1 tab(s) by mouth 2 times a day  -- Indication: For chest pain    Lipitor 80 mg oral tablet  -- 1 tab(s) by mouth once a day (at bedtime)  -- Indication: For high lipids    Effient 10 mg oral tablet  -- 1 tab(s) by mouth once a day  -- Indication: For heart    bisoprolol 10 mg oral tablet  -- 1 tab(s) by mouth once a day  -- Indication: For hypertension    Co Q-10 100 mg oral capsule  -- 1 cap(s) by mouth once a day  -- Indication: For supplement    Vitamin D3 2000 intl units oral capsule  -- 1 cap(s) by mouth once a day  -- Indication: For supplement

## 2018-10-30 NOTE — DISCHARGE NOTE ADULT - CARE PLAN
Principal Discharge DX:	Coronary artery disease due to lipid rich plaque  Goal:	Pt remains chest pain free and understands post cath discharge instructions  Assessment and plan of treatment:	Do not MISS ANY DOSEs or STOP you Aspirin/Effeint, because these drugs helps to keep your sten open, unless instructed to do so by your cardiologist.   No heavy lifting, strenuous activity, bending, straining, or unnecessary stair climbing for 2 weeks. No driving for 2 days. You may shower 24 hours following the procedure but avoid baths/swimming for 1 week. Check your groin site for bleeding and/or swelling daily following procedure and call your doctor immediately if it occurs or if you experience increased pain at the site. Follow up with your cardiologist in 1-2 weeks. You may call [facility] if you have any questions/concerns regarding your procedure [phone].   Lifestyle modification: include healthy habits to prevent stroke and future CAD  Low salt, low fat diet.   Weight management.   Take medications as prescribed.    No smoking.  Follow up with your cardiologist or primary doctor in 1- 2 weeks.  Secondary Diagnosis:	Hypercholesteremia  Goal:	Your LDL cholesterol will be less than 70mg/dL  Assessment and plan of treatment:	Continue with your cholesterol medications. Eat a heart healthy diet that is low in saturated fats and salt, and includes whole grains, fruits, vegetables and lean protein; exercise regularly (consult with your physician or cardiologist first); maintain a heart healthy weight; if you smoke - quit (A resource to help you stop smoking is the [facility] for [facility] [employer] Control – phone number [phone].). Continue to follow with your primary physician or cardiologist.  Secondary Diagnosis:	HTN (hypertension), benign  Goal:	Your blood pressure will be controlled.  Assessment and plan of treatment:	Continue with your blood pressure medications; eat a heart healthy diet with low salt diet; exercise regularly (consult with your physician or cardiologist first); maintain a heart healthy weight; if you smoke - quit (A resource to help you stop smoking is the [facility] for Tobacco Control – phone number [phone].); include healthy ways to manage stress. Continue to follow with your primary care physician or cardiologist.

## 2018-10-30 NOTE — DISCHARGE NOTE ADULT - PLAN OF CARE
Pt remains chest pain free and understands post cath discharge instructions Do not MISS ANY DOSEs or STOP you Aspirin/Effeint, because these drugs helps to keep your sten open, unless instructed to do so by your cardiologist.   No heavy lifting, strenuous activity, bending, straining, or unnecessary stair climbing for 2 weeks. No driving for 2 days. You may shower 24 hours following the procedure but avoid baths/swimming for 1 week. Check your groin site for bleeding and/or swelling daily following procedure and call your doctor immediately if it occurs or if you experience increased pain at the site. Follow up with your cardiologist in 1-2 weeks. You may call [facility] if you have any questions/concerns regarding your procedure [phone].   Lifestyle modification: include healthy habits to prevent stroke and future CAD  Low salt, low fat diet.   Weight management.   Take medications as prescribed.    No smoking.  Follow up with your cardiologist or primary doctor in 1- 2 weeks. Your LDL cholesterol will be less than 70mg/dL Continue with your cholesterol medications. Eat a heart healthy diet that is low in saturated fats and salt, and includes whole grains, fruits, vegetables and lean protein; exercise regularly (consult with your physician or cardiologist first); maintain a heart healthy weight; if you smoke - quit (A resource to help you stop smoking is the [facility] Tobacco [facility] – phone number [phone].). Continue to follow with your primary physician or cardiologist. Your blood pressure will be controlled. Continue with your blood pressure medications; eat a heart healthy diet with low salt diet; exercise regularly (consult with your physician or cardiologist first); maintain a heart healthy weight; if you smoke - quit (A resource to help you stop smoking is the [facility] for Tobacco Control – phone number [phone].); include healthy ways to manage stress. Continue to follow with your primary care physician or cardiologist.

## 2018-10-30 NOTE — DISCHARGE NOTE ADULT - PATIENT PORTAL LINK FT
You can access the [facility] Patient Portal, offered by [facility], by registering with the following website: [email or web address]

## 2018-10-30 NOTE — DISCHARGE NOTE ADULT - HOSPITAL COURSE
64 y/o male with pmh of HTN, HLD, CAD s/p MI 2008 s/p PCI/stent mLAD (10/2012); mLAD stent and D1 stent (12/2013), RPDA stent 4/2015; Diagnostic cath 3/2017 mLAD 30% at prior stent, D1 10% at prior stent, pCX , pRCA 40% at prior stent, asthma in childhood seen by Dr. Colón, Cardiologist for extreme fatigue for the last 8 months but worsening over the past 2 months with no reports of cp, sob or palpitations.  Pt states that this is the "precursor" to his anginal symptoms which requires him to take a break after working around the house (possible anginal equivalent).  Stress test reveals LVEF 52% with medium sized severe defects in prox to mid inferior and prox to mid  inferolateral and basal inferoseptal walls that are predominantly fixed consistent with infarct.  A small segment of the basal inferolateral wall is partially reversible consistent with infarct and moderate luh-infarct ischemia.  Akinesis in prox to mid inferior and basal inferoseptal walls and hypokinesis of the prox inferolateral wall.  Pt denies symptoms presently and presents for cardiac cath for further evaluation of his CAD based on his symptoms.    Of note: Pt has listed on Allergies IV dye however reports never being premedicated prior to a CT scan and on his many Cardiac caths has not been medicated prior when researched his charts and on his cath reports.  Pt has an allergy to Shrimp only.   Pt s/p PCI: JUDY x2 to RCA via R groin. Pt tolerated procedure well and overnight remained uneventful. No c/o chest pain or SOB. Pt is hemodynamically stable, EKG and all lab results reviewed. Insertion/incision site benign, no bleeding or hematoma, and cath site dressing changed. Discharge teaching provided to Pt/caretaker and verbalized understanding the instruction. Pt is stable for discharge home as per attending.

## 2018-10-30 NOTE — DISCHARGE NOTE ADULT - CARE PROVIDER_API CALL
Anish Colón), Cardiovascular Disease; Internal Medicine  [address] [address]  Phone: [phone]  Fax: [phone]

## 2018-10-30 NOTE — CHART NOTE - NSCHARTNOTEFT_GEN_A_CORE
Patient underwent a PCI procedure and is being admitted as they are at increased risk for major adverse cardiac and vascular events if discharged due to the following high risk characteristics:  CAD with multiple stents

## 2018-10-31 ENCOUNTER — INBOUND DOCUMENT (OUTPATIENT)
Age: 63
End: 2018-10-31

## 2018-10-31 VITALS
OXYGEN SATURATION: 97 % | HEART RATE: 61 BPM | SYSTOLIC BLOOD PRESSURE: 138 MMHG | DIASTOLIC BLOOD PRESSURE: 89 MMHG | TEMPERATURE: 98 F | RESPIRATION RATE: 17 BRPM

## 2018-10-31 LAB
ANION GAP SERPL CALC-SCNC: 11 MMOL/L — SIGNIFICANT CHANGE UP (ref 5–17)
BUN SERPL-MCNC: 17 MG/DL — SIGNIFICANT CHANGE UP (ref 7–23)
CALCIUM SERPL-MCNC: 8.9 MG/DL — SIGNIFICANT CHANGE UP (ref 8.4–10.5)
CHLORIDE SERPL-SCNC: 106 MMOL/L — SIGNIFICANT CHANGE UP (ref 96–108)
CO2 SERPL-SCNC: 25 MMOL/L — SIGNIFICANT CHANGE UP (ref 22–31)
CREAT SERPL-MCNC: 1.1 MG/DL — SIGNIFICANT CHANGE UP (ref 0.5–1.3)
GLUCOSE SERPL-MCNC: 108 MG/DL — HIGH (ref 70–99)
HCT VFR BLD CALC: 44.1 % — SIGNIFICANT CHANGE UP (ref 39–50)
HGB BLD-MCNC: 14.9 G/DL — SIGNIFICANT CHANGE UP (ref 13–17)
MCHC RBC-ENTMCNC: 29.6 PG — SIGNIFICANT CHANGE UP (ref 27–34)
MCHC RBC-ENTMCNC: 33.7 GM/DL — SIGNIFICANT CHANGE UP (ref 32–36)
MCV RBC AUTO: 88.1 FL — SIGNIFICANT CHANGE UP (ref 80–100)
PLATELET # BLD AUTO: 149 K/UL — LOW (ref 150–400)
POTASSIUM SERPL-MCNC: 4.2 MMOL/L — SIGNIFICANT CHANGE UP (ref 3.5–5.3)
POTASSIUM SERPL-SCNC: 4.2 MMOL/L — SIGNIFICANT CHANGE UP (ref 3.5–5.3)
RBC # BLD: 5.01 M/UL — SIGNIFICANT CHANGE UP (ref 4.2–5.8)
RBC # FLD: 12.5 % — SIGNIFICANT CHANGE UP (ref 10.3–14.5)
SODIUM SERPL-SCNC: 142 MMOL/L — SIGNIFICANT CHANGE UP (ref 135–145)
WBC # BLD: 8.4 K/UL — SIGNIFICANT CHANGE UP (ref 3.8–10.5)
WBC # FLD AUTO: 8.4 K/UL — SIGNIFICANT CHANGE UP (ref 3.8–10.5)

## 2018-10-31 PROCEDURE — 93005 ELECTROCARDIOGRAM TRACING: CPT

## 2018-10-31 PROCEDURE — 75710 ARTERY X-RAYS ARM/LEG: CPT | Mod: 59

## 2018-10-31 PROCEDURE — 80048 BASIC METABOLIC PNL TOTAL CA: CPT

## 2018-10-31 PROCEDURE — 93571 IV DOP VEL&/PRESS C FLO 1ST: CPT | Mod: 52

## 2018-10-31 PROCEDURE — 92928 PRQ TCAT PLMT NTRAC ST 1 LES: CPT | Mod: RC

## 2018-10-31 PROCEDURE — 85027 COMPLETE CBC AUTOMATED: CPT

## 2018-10-31 PROCEDURE — C9600: CPT | Mod: RC

## 2018-10-31 PROCEDURE — C1887: CPT

## 2018-10-31 PROCEDURE — 80053 COMPREHEN METABOLIC PANEL: CPT

## 2018-10-31 PROCEDURE — C1874: CPT

## 2018-10-31 PROCEDURE — C1769: CPT

## 2018-10-31 PROCEDURE — 93458 L HRT ARTERY/VENTRICLE ANGIO: CPT | Mod: 59

## 2018-10-31 PROCEDURE — C1894: CPT

## 2018-10-31 PROCEDURE — C1725: CPT

## 2018-10-31 RX ADMIN — BISOPROLOL FUMARATE 10 MILLIGRAM(S): 10 TABLET, FILM COATED ORAL at 05:23

## 2018-10-31 RX ADMIN — LISINOPRIL 10 MILLIGRAM(S): 2.5 TABLET ORAL at 05:21

## 2018-10-31 RX ADMIN — Medication 81 MILLIGRAM(S): at 05:21

## 2018-10-31 RX ADMIN — PRASUGREL 10 MILLIGRAM(S): 5 TABLET, FILM COATED ORAL at 05:21

## 2018-10-31 RX ADMIN — RANOLAZINE 500 MILLIGRAM(S): 500 TABLET, FILM COATED, EXTENDED RELEASE ORAL at 05:21

## 2018-10-31 NOTE — PROGRESS NOTE ADULT - SUBJECTIVE AND OBJECTIVE BOX
Subjective/Objective:  Patient is a 63y old  Male who presents with a chief complaint of cath (30 Oct 2018 20:29)    Allergies    contrast dye- itch/rash (Other)  penicillin (Anaphylaxis)  Shrimp (Anaphylaxis)    Intolerances      Medications:  aspirin enteric coated 81 milliGRAM(s) Oral daily  atorvastatin 80 milliGRAM(s) Oral at bedtime  bisoprolol   Tablet 10 milliGRAM(s) Oral daily  cholecalciferol 2000 Unit(s) Oral daily  lisinopril 10 milliGRAM(s) Oral daily  prasugrel 10 milliGRAM(s) Oral daily  ranolazine 500 milliGRAM(s) Oral two times a day      Review of Systems:   No c/o chest pain or SOB, and all others negative.    Vitals:  T(C): 37.1 (10-30-18 @ 21:19), Max: 37.1 (10-30-18 @ 21:19)  HR: 55 (10-30-18 @ 21:19) (54 - 68)  BP: 158/95 (10-30-18 @ 21:19) (133/95 - 171/83)  BP(mean): 112 (10-30-18 @ 07:03) (112 - 112)  RR: 16 (10-30-18 @ 21:19) (15 - 17)  SpO2: 99% (10-30-18 @ 21:19) (95% - 99%)  Wt(kg): --  Daily Height in cm: 177.8 (30 Oct 2018 07:03)    Daily Weight in k.3 (30 Oct 2018 07:03)  I&O's Summary    30 Oct 2018 07:01  -  31 Oct 2018 05:16  --------------------------------------------------------  IN: 600 mL / OUT: 1 mL / NET: 599 mL      Physical Exam:  Appearance: Pt in NAD, non-toxic  Cardiovascular: S1 S2  Cath Site: No evidence of bleeding or hematoma, Non-tender to palpation, 2+ distal pulses  Respiratory: Clear to auscultation bilaterally  Gastrointestinal: Soft, NT/ND, Bowel Sounds +  Neurologic: Non-focal                          14.9   8.4   )-----------( 149      ( 31 Oct 2018 03:23 )             44.1     10-    142  |  106  |  17  ----------------------------<  108<H>  4.2   |  25  |  1.10    Ca    8.9      31 Oct 2018 03:23    TPro  7.0  /  Alb  3.9  /  TBili  0.5  /  DBili  x   /  AST  16  /  ALT  28  /  AlkPhos  116  10-30            Lipid panel   Hgb A1c     Interpretation of Telemetry: SB/SR at HR 50-60's.  No special event over night.    Assessment/Plan:   S/P PCI: JUDY x2 to RCA via R groin. Pt tolerated procedure well and overnight remained uneventful. No c/o chest pain or SOB. Pt is hemodynamically stable, EKG and all lab results reviewed, unremarkable. Insertion/incision site benign, no bleeding or hematoma, and cath site dressing changed. Discharge teaching provided to Pt/caretaker and verbalized understanding the instruction. Pt is stable for discharge home as per attending.   F/U with cardiologist in 1-2 weeks.  Plan to d/c home in Am if stable.

## 2018-11-13 ENCOUNTER — NON-APPOINTMENT (OUTPATIENT)
Age: 63
End: 2018-11-13

## 2018-11-13 ENCOUNTER — APPOINTMENT (OUTPATIENT)
Dept: CARDIOLOGY | Facility: CLINIC | Age: 63
End: 2018-11-13
Payer: COMMERCIAL

## 2018-11-13 VITALS
OXYGEN SATURATION: 95 % | WEIGHT: 229 LBS | HEIGHT: 70 IN | DIASTOLIC BLOOD PRESSURE: 82 MMHG | HEART RATE: 67 BPM | SYSTOLIC BLOOD PRESSURE: 134 MMHG | BODY MASS INDEX: 32.78 KG/M2

## 2018-11-13 PROCEDURE — 99214 OFFICE O/P EST MOD 30 MIN: CPT

## 2018-11-13 PROCEDURE — 93000 ELECTROCARDIOGRAM COMPLETE: CPT

## 2018-11-29 ENCOUNTER — MEDICATION RENEWAL (OUTPATIENT)
Age: 63
End: 2018-11-29

## 2018-12-24 ENCOUNTER — MOBILE ON CALL (OUTPATIENT)
Age: 63
End: 2018-12-24

## 2019-01-04 ENCOUNTER — MEDICATION RENEWAL (OUTPATIENT)
Age: 64
End: 2019-01-04

## 2019-01-14 ENCOUNTER — RX RENEWAL (OUTPATIENT)
Age: 64
End: 2019-01-14

## 2019-02-03 ENCOUNTER — RX RENEWAL (OUTPATIENT)
Age: 64
End: 2019-02-03

## 2019-02-14 ENCOUNTER — RX RENEWAL (OUTPATIENT)
Age: 64
End: 2019-02-14

## 2019-02-14 ENCOUNTER — MOBILE ON CALL (OUTPATIENT)
Age: 64
End: 2019-02-14

## 2019-03-27 ENCOUNTER — RX RENEWAL (OUTPATIENT)
Age: 64
End: 2019-03-27

## 2019-04-04 ENCOUNTER — APPOINTMENT (OUTPATIENT)
Dept: CARDIOLOGY | Facility: CLINIC | Age: 64
End: 2019-04-04

## 2019-04-22 ENCOUNTER — INPATIENT (INPATIENT)
Facility: HOSPITAL | Age: 64
LOS: 1 days | Discharge: HOME | DRG: 282 | End: 2019-04-24
Attending: INTERNAL MEDICINE | Admitting: INTERNAL MEDICINE
Payer: COMMERCIAL

## 2019-04-22 VITALS
TEMPERATURE: 98 F | DIASTOLIC BLOOD PRESSURE: 107 MMHG | WEIGHT: 229.94 LBS | RESPIRATION RATE: 17 BRPM | SYSTOLIC BLOOD PRESSURE: 159 MMHG | HEART RATE: 158 BPM | OXYGEN SATURATION: 97 % | HEIGHT: 71 IN

## 2019-04-22 DIAGNOSIS — R07.9 CHEST PAIN, UNSPECIFIED: ICD-10-CM

## 2019-04-22 DIAGNOSIS — C43.9 MALIGNANT MELANOMA OF SKIN, UNSPECIFIED: Chronic | ICD-10-CM

## 2019-04-22 DIAGNOSIS — Z95.5 PRESENCE OF CORONARY ANGIOPLASTY IMPLANT AND GRAFT: Chronic | ICD-10-CM

## 2019-04-22 LAB
ALBUMIN SERPL ELPH-MCNC: 4.1 G/DL — SIGNIFICANT CHANGE UP (ref 3.3–5)
ALP SERPL-CCNC: 118 U/L — SIGNIFICANT CHANGE UP (ref 40–120)
ALT FLD-CCNC: 33 U/L — SIGNIFICANT CHANGE UP (ref 10–45)
ANION GAP SERPL CALC-SCNC: 13 MMOL/L — SIGNIFICANT CHANGE UP (ref 5–17)
APTT BLD: 30.6 SEC — SIGNIFICANT CHANGE UP (ref 27.5–36.3)
APTT BLD: 60.1 SEC — HIGH (ref 27.5–36.3)
AST SERPL-CCNC: 26 U/L — SIGNIFICANT CHANGE UP (ref 10–40)
BASE EXCESS BLDV CALC-SCNC: 4.1 MMOL/L — HIGH (ref -2–2)
BASOPHILS # BLD AUTO: 0 K/UL — SIGNIFICANT CHANGE UP (ref 0–0.2)
BASOPHILS NFR BLD AUTO: 0.2 % — SIGNIFICANT CHANGE UP (ref 0–2)
BILIRUB SERPL-MCNC: 0.7 MG/DL — SIGNIFICANT CHANGE UP (ref 0.2–1.2)
BUN SERPL-MCNC: 13 MG/DL — SIGNIFICANT CHANGE UP (ref 7–23)
CA-I SERPL-SCNC: 1.14 MMOL/L — SIGNIFICANT CHANGE UP (ref 1.12–1.3)
CALCIUM SERPL-MCNC: 9.5 MG/DL — SIGNIFICANT CHANGE UP (ref 8.4–10.5)
CHLORIDE BLDV-SCNC: 111 MMOL/L — HIGH (ref 96–108)
CHLORIDE SERPL-SCNC: 105 MMOL/L — SIGNIFICANT CHANGE UP (ref 96–108)
CO2 BLDV-SCNC: 30 MMOL/L — SIGNIFICANT CHANGE UP (ref 22–30)
CO2 SERPL-SCNC: 23 MMOL/L — SIGNIFICANT CHANGE UP (ref 22–31)
CREAT SERPL-MCNC: 1.09 MG/DL — SIGNIFICANT CHANGE UP (ref 0.5–1.3)
D DIMER BLD IA.RAPID-MCNC: 181 NG/ML DDU — SIGNIFICANT CHANGE UP
EOSINOPHIL # BLD AUTO: 0.2 K/UL — SIGNIFICANT CHANGE UP (ref 0–0.5)
EOSINOPHIL NFR BLD AUTO: 2.7 % — SIGNIFICANT CHANGE UP (ref 0–6)
GAS PNL BLDV: 135 MMOL/L — LOW (ref 136–145)
GAS PNL BLDV: SIGNIFICANT CHANGE UP
GLUCOSE BLDV-MCNC: 121 MG/DL — HIGH (ref 70–99)
GLUCOSE SERPL-MCNC: 123 MG/DL — HIGH (ref 70–99)
HCO3 BLDV-SCNC: 29 MMOL/L — SIGNIFICANT CHANGE UP (ref 21–29)
HCT VFR BLD CALC: 42.1 % — SIGNIFICANT CHANGE UP (ref 39–50)
HCT VFR BLD CALC: 44.5 % — SIGNIFICANT CHANGE UP (ref 39–50)
HCT VFR BLD CALC: 47.9 % — SIGNIFICANT CHANGE UP (ref 39–50)
HCT VFR BLDA CALC: 51 % — HIGH (ref 39–50)
HGB BLD CALC-MCNC: 16.7 G/DL — SIGNIFICANT CHANGE UP (ref 13–17)
HGB BLD-MCNC: 14.6 G/DL — SIGNIFICANT CHANGE UP (ref 13–17)
HGB BLD-MCNC: 14.8 G/DL — SIGNIFICANT CHANGE UP (ref 13–17)
HGB BLD-MCNC: 16.1 G/DL — SIGNIFICANT CHANGE UP (ref 13–17)
INR BLD: 0.99 RATIO — SIGNIFICANT CHANGE UP (ref 0.88–1.16)
LACTATE BLDV-MCNC: 2.1 MMOL/L — HIGH (ref 0.7–2)
LYMPHOCYTES # BLD AUTO: 1.9 K/UL — SIGNIFICANT CHANGE UP (ref 1–3.3)
LYMPHOCYTES # BLD AUTO: 29.2 % — SIGNIFICANT CHANGE UP (ref 13–44)
MAGNESIUM SERPL-MCNC: 1.8 MG/DL — SIGNIFICANT CHANGE UP (ref 1.6–2.6)
MCHC RBC-ENTMCNC: 30 PG — SIGNIFICANT CHANGE UP (ref 27–34)
MCHC RBC-ENTMCNC: 30.1 PG — SIGNIFICANT CHANGE UP (ref 27–34)
MCHC RBC-ENTMCNC: 31.2 PG — SIGNIFICANT CHANGE UP (ref 27–34)
MCHC RBC-ENTMCNC: 33.4 GM/DL — SIGNIFICANT CHANGE UP (ref 32–36)
MCHC RBC-ENTMCNC: 33.7 GM/DL — SIGNIFICANT CHANGE UP (ref 32–36)
MCHC RBC-ENTMCNC: 34.6 GM/DL — SIGNIFICANT CHANGE UP (ref 32–36)
MCV RBC AUTO: 89.3 FL — SIGNIFICANT CHANGE UP (ref 80–100)
MCV RBC AUTO: 89.8 FL — SIGNIFICANT CHANGE UP (ref 80–100)
MCV RBC AUTO: 90.2 FL — SIGNIFICANT CHANGE UP (ref 80–100)
MONOCYTES # BLD AUTO: 0.5 K/UL — SIGNIFICANT CHANGE UP (ref 0–0.9)
MONOCYTES NFR BLD AUTO: 7.5 % — SIGNIFICANT CHANGE UP (ref 2–14)
NEUTROPHILS # BLD AUTO: 3.9 K/UL — SIGNIFICANT CHANGE UP (ref 1.8–7.4)
NEUTROPHILS NFR BLD AUTO: 60.5 % — SIGNIFICANT CHANGE UP (ref 43–77)
NT-PROBNP SERPL-SCNC: 298 PG/ML — SIGNIFICANT CHANGE UP (ref 0–300)
PCO2 BLDV: 44 MMHG — SIGNIFICANT CHANGE UP (ref 35–50)
PH BLDV: 7.43 — SIGNIFICANT CHANGE UP (ref 7.35–7.45)
PHOSPHATE SERPL-MCNC: 3.3 MG/DL — SIGNIFICANT CHANGE UP (ref 2.5–4.5)
PLATELET # BLD AUTO: 163 K/UL — SIGNIFICANT CHANGE UP (ref 150–400)
PLATELET # BLD AUTO: 164 K/UL — SIGNIFICANT CHANGE UP (ref 150–400)
PLATELET # BLD AUTO: 175 K/UL — SIGNIFICANT CHANGE UP (ref 150–400)
PO2 BLDV: 25 MMHG — SIGNIFICANT CHANGE UP (ref 25–45)
POTASSIUM BLDV-SCNC: 3.8 MMOL/L — SIGNIFICANT CHANGE UP (ref 3.5–5.3)
POTASSIUM SERPL-MCNC: 4.3 MMOL/L — SIGNIFICANT CHANGE UP (ref 3.5–5.3)
POTASSIUM SERPL-SCNC: 4.3 MMOL/L — SIGNIFICANT CHANGE UP (ref 3.5–5.3)
PROT SERPL-MCNC: 6.9 G/DL — SIGNIFICANT CHANGE UP (ref 6–8.3)
PROTHROM AB SERPL-ACNC: 11.3 SEC — SIGNIFICANT CHANGE UP (ref 10–12.9)
RBC # BLD: 4.67 M/UL — SIGNIFICANT CHANGE UP (ref 4.2–5.8)
RBC # BLD: 4.95 M/UL — SIGNIFICANT CHANGE UP (ref 4.2–5.8)
RBC # BLD: 5.37 M/UL — SIGNIFICANT CHANGE UP (ref 4.2–5.8)
RBC # FLD: 12.1 % — SIGNIFICANT CHANGE UP (ref 10.3–14.5)
SAO2 % BLDV: 42 % — LOW (ref 67–88)
SODIUM SERPL-SCNC: 141 MMOL/L — SIGNIFICANT CHANGE UP (ref 135–145)
TROPONIN T, HIGH SENSITIVITY RESULT: 136 NG/L — HIGH (ref 0–51)
TROPONIN T, HIGH SENSITIVITY RESULT: 14 NG/L — SIGNIFICANT CHANGE UP (ref 0–51)
TROPONIN T, HIGH SENSITIVITY RESULT: 74 NG/L — HIGH (ref 0–51)
TSH SERPL-MCNC: 1.59 UIU/ML — SIGNIFICANT CHANGE UP (ref 0.27–4.2)
WBC # BLD: 6.5 K/UL — SIGNIFICANT CHANGE UP (ref 3.8–10.5)
WBC # BLD: 7.3 K/UL — SIGNIFICANT CHANGE UP (ref 3.8–10.5)
WBC # BLD: 7.4 K/UL — SIGNIFICANT CHANGE UP (ref 3.8–10.5)
WBC # FLD AUTO: 6.5 K/UL — SIGNIFICANT CHANGE UP (ref 3.8–10.5)
WBC # FLD AUTO: 7.3 K/UL — SIGNIFICANT CHANGE UP (ref 3.8–10.5)
WBC # FLD AUTO: 7.4 K/UL — SIGNIFICANT CHANGE UP (ref 3.8–10.5)

## 2019-04-22 PROCEDURE — 99285 EMERGENCY DEPT VISIT HI MDM: CPT | Mod: 25

## 2019-04-22 PROCEDURE — 71045 X-RAY EXAM CHEST 1 VIEW: CPT | Mod: 26

## 2019-04-22 PROCEDURE — 93010 ELECTROCARDIOGRAM REPORT: CPT | Mod: NC

## 2019-04-22 PROCEDURE — 99223 1ST HOSP IP/OBS HIGH 75: CPT

## 2019-04-22 RX ORDER — NITROGLYCERIN 6.5 MG
0.4 CAPSULE, EXTENDED RELEASE ORAL ONCE
Qty: 0 | Refills: 0 | Status: COMPLETED | OUTPATIENT
Start: 2019-04-22 | End: 2019-04-22

## 2019-04-22 RX ORDER — HEPARIN SODIUM 5000 [USP'U]/ML
INJECTION INTRAVENOUS; SUBCUTANEOUS
Qty: 25000 | Refills: 0 | Status: DISCONTINUED | OUTPATIENT
Start: 2019-04-22 | End: 2019-04-23

## 2019-04-22 RX ORDER — HEPARIN SODIUM 5000 [USP'U]/ML
6000 INJECTION INTRAVENOUS; SUBCUTANEOUS EVERY 6 HOURS
Qty: 0 | Refills: 0 | Status: DISCONTINUED | OUTPATIENT
Start: 2019-04-22 | End: 2019-04-23

## 2019-04-22 RX ORDER — PRASUGREL 5 MG/1
10 TABLET, FILM COATED ORAL DAILY
Qty: 0 | Refills: 0 | Status: DISCONTINUED | OUTPATIENT
Start: 2019-04-22 | End: 2019-04-24

## 2019-04-22 RX ORDER — DIPHENHYDRAMINE HCL 50 MG
50 CAPSULE ORAL ONCE
Qty: 0 | Refills: 0 | Status: COMPLETED | OUTPATIENT
Start: 2019-04-23 | End: 2019-04-23

## 2019-04-22 RX ORDER — ASPIRIN/CALCIUM CARB/MAGNESIUM 324 MG
243 TABLET ORAL ONCE
Qty: 0 | Refills: 0 | Status: COMPLETED | OUTPATIENT
Start: 2019-04-22 | End: 2019-04-22

## 2019-04-22 RX ORDER — HEPARIN SODIUM 5000 [USP'U]/ML
4000 INJECTION INTRAVENOUS; SUBCUTANEOUS EVERY 6 HOURS
Qty: 0 | Refills: 0 | Status: DISCONTINUED | OUTPATIENT
Start: 2019-04-22 | End: 2019-04-22

## 2019-04-22 RX ORDER — LISINOPRIL 2.5 MG/1
10 TABLET ORAL DAILY
Qty: 0 | Refills: 0 | Status: DISCONTINUED | OUTPATIENT
Start: 2019-04-22 | End: 2019-04-24

## 2019-04-22 RX ORDER — BISOPROLOL FUMARATE 10 MG/1
10 TABLET, FILM COATED ORAL DAILY
Qty: 0 | Refills: 0 | Status: DISCONTINUED | OUTPATIENT
Start: 2019-04-22 | End: 2019-04-24

## 2019-04-22 RX ORDER — ASPIRIN/CALCIUM CARB/MAGNESIUM 324 MG
81 TABLET ORAL DAILY
Qty: 0 | Refills: 0 | Status: DISCONTINUED | OUTPATIENT
Start: 2019-04-22 | End: 2019-04-23

## 2019-04-22 RX ORDER — HEPARIN SODIUM 5000 [USP'U]/ML
8500 INJECTION INTRAVENOUS; SUBCUTANEOUS ONCE
Qty: 0 | Refills: 0 | Status: COMPLETED | OUTPATIENT
Start: 2019-04-22 | End: 2019-04-22

## 2019-04-22 RX ORDER — HEPARIN SODIUM 5000 [USP'U]/ML
INJECTION INTRAVENOUS; SUBCUTANEOUS
Qty: 25000 | Refills: 0 | Status: DISCONTINUED | OUTPATIENT
Start: 2019-04-22 | End: 2019-04-22

## 2019-04-22 RX ORDER — CHLORHEXIDINE GLUCONATE 213 G/1000ML
1 SOLUTION TOPICAL ONCE
Qty: 0 | Refills: 0 | Status: DISCONTINUED | OUTPATIENT
Start: 2019-04-22 | End: 2019-04-24

## 2019-04-22 RX ORDER — DILTIAZEM HCL 120 MG
10 CAPSULE, EXT RELEASE 24 HR ORAL ONCE
Qty: 0 | Refills: 0 | Status: DISCONTINUED | OUTPATIENT
Start: 2019-04-22 | End: 2019-04-22

## 2019-04-22 RX ORDER — HEPARIN SODIUM 5000 [USP'U]/ML
8500 INJECTION INTRAVENOUS; SUBCUTANEOUS EVERY 6 HOURS
Qty: 0 | Refills: 0 | Status: DISCONTINUED | OUTPATIENT
Start: 2019-04-22 | End: 2019-04-22

## 2019-04-22 RX ORDER — ASPIRIN/CALCIUM CARB/MAGNESIUM 324 MG
162 TABLET ORAL DAILY
Qty: 0 | Refills: 0 | Status: DISCONTINUED | OUTPATIENT
Start: 2019-04-22 | End: 2019-04-22

## 2019-04-22 RX ORDER — RANOLAZINE 500 MG/1
500 TABLET, FILM COATED, EXTENDED RELEASE ORAL
Qty: 0 | Refills: 0 | Status: DISCONTINUED | OUTPATIENT
Start: 2019-04-22 | End: 2019-04-24

## 2019-04-22 RX ORDER — ATORVASTATIN CALCIUM 80 MG/1
80 TABLET, FILM COATED ORAL AT BEDTIME
Qty: 0 | Refills: 0 | Status: DISCONTINUED | OUTPATIENT
Start: 2019-04-22 | End: 2019-04-24

## 2019-04-22 RX ORDER — MAGNESIUM OXIDE 400 MG ORAL TABLET 241.3 MG
400 TABLET ORAL ONCE
Qty: 0 | Refills: 0 | Status: COMPLETED | OUTPATIENT
Start: 2019-04-22 | End: 2019-04-22

## 2019-04-22 RX ADMIN — HEPARIN SODIUM 8500 UNIT(S): 5000 INJECTION INTRAVENOUS; SUBCUTANEOUS at 10:10

## 2019-04-22 RX ADMIN — HEPARIN SODIUM 1000 UNIT(S)/HR: 5000 INJECTION INTRAVENOUS; SUBCUTANEOUS at 11:34

## 2019-04-22 RX ADMIN — Medication 0.4 MILLIGRAM(S): at 08:01

## 2019-04-22 RX ADMIN — HEPARIN SODIUM 1000 UNIT(S)/HR: 5000 INJECTION INTRAVENOUS; SUBCUTANEOUS at 18:33

## 2019-04-22 RX ADMIN — HEPARIN SODIUM 1800 UNIT(S)/HR: 5000 INJECTION INTRAVENOUS; SUBCUTANEOUS at 10:11

## 2019-04-22 RX ADMIN — ATORVASTATIN CALCIUM 80 MILLIGRAM(S): 80 TABLET, FILM COATED ORAL at 22:24

## 2019-04-22 RX ADMIN — RANOLAZINE 500 MILLIGRAM(S): 500 TABLET, FILM COATED, EXTENDED RELEASE ORAL at 18:33

## 2019-04-22 RX ADMIN — Medication 243 MILLIGRAM(S): at 08:01

## 2019-04-22 RX ADMIN — MAGNESIUM OXIDE 400 MG ORAL TABLET 400 MILLIGRAM(S): 241.3 TABLET ORAL at 10:17

## 2019-04-22 NOTE — ED PROVIDER NOTE - OBJECTIVE STATEMENT
Attending Reanna Paige: 64 y/o male h/o CAD s/p multiple stents in the past last in october presenting with chest pain. had mild pain when first woke up, pain located in middle of the chest with radiation to both arms. took a baby aspirin and nitro with small amount of relief. however pain re-occured. pain worsening located in the middle of the chest and radiates to both arms. no fevesr or chills. no h/o afib. no black or bloody stools. pain similar to previous cardiac pain. does report mild pain with inspiration. no back pain. no vomiting Attending Reanna Pagie: 62 y/o male h/o CAD s/p multiple stents in the past last in october presenting with chest pain. had mild pain when first woke up, pain located in middle of the chest with radiation to both arms. took a baby aspirin and nitro with small amount of relief. however pain re-occured. pain worsening located in the middle of the chest and radiates to both arms. no fevers or chills. no h/o afib. no black or bloody stools. pain similar to previous cardiac pain. does report mild pain with inspiration. no back pain. no vomiting    Resident Jared: currently 4/10 chest "discomfort," pt states he took his morning medications. Woke up and felt symptoms onset acutely when he got up from bed. Slept well and normal state of health yesterday. Pt does not recall h/o cardiact dysrhythmia. +SOB at this time. 7 stents previously. On effient and asa daily, compliant on medications. Social EtoH, occasional cigars. Drinks coffee, no other caffeine.    Allergies: shrimp and penicillin (anaphylaxis to both)  PMD and Cardiologist: Dr. Anish Colón   Inverventional Cards: Shayan Willis

## 2019-04-22 NOTE — ED ADULT NURSE REASSESSMENT NOTE - NS ED NURSE REASSESS COMMENT FT1
Pt denies signs of bleeding. Resting quietly. Will continue to monitor. Aware of admitting status. Awaiting bed assignment.

## 2019-04-22 NOTE — H&P ADULT - NSICDXPASTMEDICALHX_GEN_ALL_CORE_FT
PAST MEDICAL HISTORY:  Ankle fracture     Asthma     H/O heart artery stent 2008, 2012    HTN (hypertension)     Hypercholesteremia     Melanoma     MI (myocardial infarction) 2008

## 2019-04-22 NOTE — ED PROVIDER NOTE - PHYSICAL EXAMINATION
Attending Reanna Paige: Gen: NAD, heent: atrauamtic, eomi, perrla, mmm, op pink, uvula midline, neck; nttp, no nuchal rigidity, chest: nttp, no crepitus, cv: irregular, rapid + murmurs, lungs: ctab, abd: soft, nontender, nondistended, no peritoneal signs, +BS, no guarding, ext: wwp, neg homans, skin: no rash, neuro: awake and alert, following commands, speech clear, sensation and strength intact, no focal deficits

## 2019-04-22 NOTE — ED PROVIDER NOTE - ATTENDING CONTRIBUTION TO CARE
Attending MD Reanna Paige:  I personally have seen and examined this patient.  Resident note reviewed and agree on plan of care and except where noted.  See HPI, PE, and MDM for details.

## 2019-04-22 NOTE — H&P ADULT - NSHPREVIEWOFSYSTEMS_GEN_ALL_CORE
Constitutional: No fever, fatigue or weight loss.  Skin: No rash.  Eyes: No recent vision problems or eye pain.  ENT: No congestion, ear pain, or sore throat.  Endocrine: No thyroid problems.  Cardiovascular: chest pain as per hpi  Respiratory: No cough, shortness of breath, congestion, or wheezing.  Gastrointestinal: No abdominal pain, nausea, vomiting, or diarrhea.  Genitourinary: No dysuria.  Musculoskeletal: No joint swelling.  Neurologic: No headache.

## 2019-04-22 NOTE — CONSULT NOTE ADULT - ASSESSMENT
63 year-old with known significant CAD status post multiple PCI (most recently October 2018) who presents with his angina, found to be in new onset atrial fibrillation with RVR, subsequently spontaneously converted to NSR with nitroglycerin, now noted to have rising high sensitivity troponin (from 14 - 74 ng/L).    Continue dual antiplatelet therapy and high intensity statin.  Continue beta-blocker.  Continue heparin gtt for both ACS and new onset atrial fibrillation in patient with CHADSVASc = 2 (hypertension and known coronary artery disease).    Plan for left heart cath after premedication for contrast allergy. If patient has no new stent, he will be discharged on one antiplatelet agent and a NOAC - likely prasugrel and Xarelto.    Suspect PAMELA - which can be evaluated as outpatient.

## 2019-04-22 NOTE — H&P ADULT - ASSESSMENT
62 yo male h/o cad s/p pci x7, mi, htn, chol, a/w chest pain, afib with rvr, and NSTEMI    chest pain with positive trop  nstemi  cards consulted  trend trops  tele  echo  plan for possible cardiac cath  cont asa/effient/statin  hep gtt    afib with rvr  now self converted to nsr  cont hep gtt  tele monitor  cont BB  check tsh    htn  bp acceptable  cont home meds    chol  check lipid panel  cont statin      cont other home meds

## 2019-04-22 NOTE — ED ADULT NURSE NOTE - NSIMPLEMENTINTERV_GEN_ALL_ED
Implemented All Universal Safety Interventions:  [place] to call system. Call bell, personal items and telephone within reach. Instruct patient to call for assistance. Room bathroom lighting operational. Non-slip footwear when patient is off stretcher. Physically safe environment: no spills, clutter or unnecessary equipment. Stretcher in lowest position, wheels locked, appropriate side rails in place.

## 2019-04-22 NOTE — H&P ADULT - NSHPPHYSICALEXAM_GEN_ALL_CORE
Vital Signs Last 24 Hrs  T(C): 36.7 (22 Apr 2019 13:33), Max: 37.1 (22 Apr 2019 10:03)  T(F): 98 (22 Apr 2019 13:33), Max: 98.7 (22 Apr 2019 10:03)  HR: 56 (22 Apr 2019 13:33) (52 - 158)  BP: 132/66 (22 Apr 2019 13:33) (127/63 - 169/113)  BP(mean): --  RR: 18 (22 Apr 2019 13:33) (17 - 18)  SpO2: 99% (22 Apr 2019 13:33) (97% - 99%)    PHYSICAL EXAM:  GENERAL: NAD, well-developed  HEAD:  Atraumatic, Normocephalic  EYES: EOMI, PERRLA, conjunctiva and sclera clear  NECK: Supple, No JVD  CHEST/LUNG: Clear to auscultation bilaterally; No wheeze  HEART: Regular rate and rhythm; No murmurs, rubs, or gallops  ABDOMEN: Soft, Nontender, Nondistended; Bowel sounds present  EXTREMITIES:  2+ Peripheral Pulses, No clubbing, cyanosis, or edema  PSYCH: AAOx3  NEUROLOGY: non-focal  SKIN: No rashes or lesions

## 2019-04-22 NOTE — ED ADULT NURSE NOTE - OBJECTIVE STATEMENT
63YOM pmh CAD with multiple stents, HTN, HLD presents the ED c/o CP since this morning. Pt states pain is dull in middle chest radiating to bilateral extremities. Pt took 81mg aspirin and 1 sublingual nitro.  Pt placed on cardiac monitor. EKG done. Afib noted. MD Karis Paige at bedside. Pt states he is SOB. Denies fever, chills, HA, weakness, N/V/D, abd pain, burning upon urination. Safety and comfort measures provided. Wife at bedside. Will continue to monitor.

## 2019-04-22 NOTE — ED PROVIDER NOTE - PROGRESS NOTE DETAILS
Attending Raenna Paige: pt currently pain free. now in nsr. will repeat ekg Attending Reanna Paige: d/w [place] hebert recommends admission to [facility]. pt currently chest pain free. will need anticoagulation Attending Reanna Paige: pt currently chest pain free. repeat ekg shows slight st elevation in II. troponin increaseing. pt admitted. will call dr hebert to evaluate

## 2019-04-22 NOTE — H&P ADULT - NSICDXPASTSURGICALHX_GEN_ALL_CORE_FT
PAST SURGICAL HISTORY:  History of coronary artery stent placement 2007, 2012, 2013    Melanoma removal    No Past Surgical History     S/P angioplasty with stent

## 2019-04-22 NOTE — ED PROVIDER NOTE - CLINICAL SUMMARY MEDICAL DECISION MAKING FREE TEXT BOX
Pt with PMH of CAD s/p 7 stents presents to the ED for SOB and palpitations, found to be afib RVR. +CP and SOB, acute onset today. On daily effient and aspirin. Plan: EKG, labs, including electrolytes, trop, pro-bnp, TSH, CXR, aspirin, nitro. Clear lungs on exam, irregularly irregular tachycardia. Pt speaking in full sentences, appears mildly uncomfortable. Called Dr. Anish Colón, pt's cardiologist, awaiting callback. Pt with PMH of CAD s/p 7 stents presents to the ED for SOB and palpitations, found to be afib RVR. +CP and SOB, acute onset today. On daily effient and aspirin. Plan: EKG, labs, including electrolytes, trop, pro-bnp, TSH, CXR, aspirin, nitro. Clear lungs on exam, irregularly irregular tachycardia. Pt speaking in full sentences, appears mildly uncomfortable. Called Dr. Anish Colón, pt's cardiologist, awaiting callback.  Attending Reanna Paige: 64 y/o male with h/o sig CAD with multiple stents in the past presenting with chest pain. upon arrival pt found to be in new onset afib. no pain with inspirations. pt reprots pain similar to prior cardiac events. pt placed on tele. no black or bloody stools to suggest GI bleed. no back pain to suggest dissection. history concenring for ischemia however with new onset afib consider PE. while in the ED afib broke. pain could be secondary to new afib as well. started on blood thinnners. will d/w cardiology for consult. pt on tele. extended electrolyte checked. plan to admit

## 2019-04-22 NOTE — CONSULT NOTE ADULT - SUBJECTIVE AND OBJECTIVE BOX
Patient seen and evaluated @ 2pm in [facility]  Chief Complaint:  chest pain    HPI:  62 y/o male h/o CAD s/p multiple stents in the past last in october presenting with chest pain. had mild pain when first woke up, pain located in middle of the chest with radiation to both arms. took a baby aspirin and nitro with small amount of relief. however pain re-occured. pain worsening located in the middle of the chest and radiates to both arms. no fevers or chills. no h/o afib. no black or bloody stools. pain similar to previous cardiac pain. does report mild pain with inspiration. no back pain. no vomiting   7 stents previously. On effient and asa daily, compliant on medications. Social EtoH, occasional cigars. Drinks coffee, no other caffeine.    in er, pt was in afib with rvr, but self converted to nsr (22 Apr 2019 13:21)    PMH:   Ankle fracture  Melanoma  MI (myocardial infarction)  Asthma  H/O heart artery stent  HTN (hypertension)  Hypercholesteremia    PSH:   History of coronary artery stent placement  Melanoma  S/P angioplasty with stent  No Past Surgical History    Medications:   aspirin enteric coated 81 milliGRAM(s) Oral daily  atorvastatin 80 milliGRAM(s) Oral at bedtime  bisoprolol   Tablet 10 milliGRAM(s) Oral daily  heparin  Infusion.  Unit(s)/Hr IV Continuous <Continuous>  heparin  Injectable 6000 Unit(s) IV Push every 6 hours PRN  lisinopril 10 milliGRAM(s) Oral daily  prasugrel 10 milliGRAM(s) Oral daily  ranolazine 500 milliGRAM(s) Oral two times a day    Allergies:  contrast dye- itch/rash (Other)  penicillin (Anaphylaxis)  Shrimp (Anaphylaxis)    FAMILY HISTORY: significant coronary artery disease in first degree relatives    Social History: marries, lives with wife  Smoking: occasional cigar  Alcohol: occasional EtOH (two bloody alejandra yesterday)  Drugs: no illicit drug use    Review of Systems:  chest pain from AM has resolved  Constitutional: No fever, chills, fatigue, or changes in weight  HEENT: No blurry vision, eye pain, headache, runny nose, or sore throat  Respiratory: No shortness of breath, cough, or wheezing  Cardiovascular: No chest pain or palpitations  Gastrointestinal: No nausea, vomiting, diarrhea, or abdominal pain  Genitourinary: No dysuria or incontinence  Extremities: No lower extremity swelling  Neurologic: No focal findings  Lymphatic: No lymphadenopathy   Skin: No rash  Psychiatry: No anxiety or depression  10 point review of systems is otherwise negative except as mentioned above            Physical Exam:  T(C): 36.7 (04-22-19 @ 13:33), Max: 37.1 (04-22-19 @ 10:03)  HR: 56 (04-22-19 @ 13:33) (52 - 158)  BP: 132/66 (04-22-19 @ 13:33) (127/63 - 169/113)  RR: 18 (04-22-19 @ 13:33) (17 - 18)  SpO2: 99% (04-22-19 @ 13:33) (97% - 99%)  Wt(kg): --    Daily Height in cm: 180.34 (22 Apr 2019 07:31)    Daily     Appearance: Normal, well groomed, NAD  Eyes: PERRLA, EOMI, pink conjunctiva, no scleral icterus   HENT: Normal oral mucosa  Cardiovascular: RRR, S1, S2, no murmur, rub, or gallop; no edema; no JVD  Respiratory: Clear to auscultation bilaterally  Gastrointestinal: Soft, non-tender, non-distended, BS+  Musculoskeletal: No clubbing or joint deformity   Neurologic: No focal weakness  Lymphatic: No lymphadenopathy  Psychiatry: AAOx3 with appropriate mood and affect  Skin: No rashes, ecchymoses, or cyanosis    Cardiovascular Diagnostic Testing:    ECG: initially AFib with RVR, now sinus bradycardia with inferior Q-waves and poor R-wave progression    Echo: Pending    Cath: < from: Cardiac Cath Lab - Adult (10.30.18 @ 08:10) >  VENTRICLES: EF calculated by contrast ventriculography was 50 %.  CORONARY VESSELS: The coronary circulation is rightdominant.  LM:   --  LM: Angiography showed moderate atherosclerosis.  LAD:   --  Mid LAD: There was a 20 % stenosis at the site of a prior stent.  --  D1: Angiography showed moderate atherosclerosis.  CX:   --  Proximal circumflex: There was a 100 %stenosis. There was good  collateral blood supply to the distal myocardium.  RCA:   --  Proximal RCA: There was a diffuse 70 % stenosis at the site of a  prior stent.  RIGHT LOWER EXTREMITY VESSELS: Right external iliac: The vessel was patent.  Rightcommon femoral: The vessel was patent. Angiography showed mild  atherosclerosis.  COMPLICATIONS: There were no complications.  DIAGNOSTIC RECOMMENDATIONS: Proceed with PCI of the iFR positive proximal  RCA in-stent restenosis in the setting of recentonset unstable angina  (crescendo angina with increasing nitroglycerin requirements).  INTERVENTIONAL RECOMMENDATIONS: Continue aspirin and Effient for 1 year  post PCI; maximize statin therapy as tolerated and titrate to a goal LDL  of less than 70;continued optimization of the anti-ischemic regimen in  the setting of an LCX ; outpatient follow up with Dr Colón within 2  weeks.  Prepared and signed by  Shayan Yarbrough M.D.  Signed 11/13/2018 18:54:01    < end of copied text >    Interpretation of Telemetry: NSR    Imaging: < from: Xray Chest 1 View- PORTABLE-Urgent (04.22.19 @ 08:59) >    IMPRESSION: Clear lungs.    YAIMA BAUTISTA M.D., ATTENDING RADIOLOGIST  This document has been electronically signed. Apr 22 2019  9:09AM    < end of copied text >      Labs:                        16.1   6.5   )-----------( 175      ( 22 Apr 2019 08:00 )             47.9     04-22    141  |  105  |  13  ----------------------------<  123<H>  4.3   |  23  |  1.09    Ca    9.5      22 Apr 2019 08:00  Phos  3.3     04-22  Mg     1.8     04-22    TPro  6.9  /  Alb  4.1  /  TBili  0.7  /  DBili  x   /  AST  26  /  ALT  33  /  AlkPhos  118  04-22    PT/INR - ( 22 Apr 2019 08:00 )   PT: 11.3 sec;   INR: 0.99 ratio      PTT - ( 22 Apr 2019 08:00 )  PTT:30.6 sec    Serum Pro-Brain Natriuretic Peptide: 298 pg/mL (04-22 @ 08:00)    Thyroid Stimulating Hormone, Serum: 1.59 uIU/mL (04-22 @ 10:47)

## 2019-04-22 NOTE — H&P ADULT - HISTORY OF PRESENT ILLNESS
62 y/o male h/o CAD s/p multiple stents in the past last in october presenting with chest pain. had mild pain when first woke up, pain located in middle of the chest with radiation to both arms. took a baby aspirin and nitro with small amount of relief. however pain re-occured. pain worsening located in the middle of the chest and radiates to both arms. no fevers or chills. no h/o afib. no black or bloody stools. pain similar to previous cardiac pain. does report mild pain with inspiration. no back pain. no vomiting   7 stents previously. On effient and asa daily, compliant on medications. Social EtoH, occasional cigars. Drinks coffee, no other caffeine. 62 y/o male h/o CAD s/p multiple stents in the past last in october presenting with chest pain. had mild pain when first woke up, pain located in middle of the chest with radiation to both arms. took a baby aspirin and nitro with small amount of relief. however pain re-occured. pain worsening located in the middle of the chest and radiates to both arms. no fevers or chills. no h/o afib. no black or bloody stools. pain similar to previous cardiac pain. does report mild pain with inspiration. no back pain. no vomiting   7 stents previously. On effient and asa daily, compliant on medications. Social EtoH, occasional cigars. Drinks coffee, no other caffeine.    in er, pt was in afib with rvr, but self converted to nsr

## 2019-04-23 ENCOUNTER — TRANSCRIPTION ENCOUNTER (OUTPATIENT)
Age: 64
End: 2019-04-23

## 2019-04-23 LAB
ANION GAP SERPL CALC-SCNC: 13 MMOL/L — SIGNIFICANT CHANGE UP (ref 5–17)
APTT BLD: 39.5 SEC — HIGH (ref 27.5–36.3)
APTT BLD: 54.1 SEC — HIGH (ref 27.5–36.3)
BASOPHILS # BLD AUTO: 0.01 K/UL — SIGNIFICANT CHANGE UP (ref 0–0.2)
BASOPHILS NFR BLD AUTO: 0.1 % — SIGNIFICANT CHANGE UP (ref 0–2)
BUN SERPL-MCNC: 14 MG/DL — SIGNIFICANT CHANGE UP (ref 7–23)
CALCIUM SERPL-MCNC: 9.3 MG/DL — SIGNIFICANT CHANGE UP (ref 8.4–10.5)
CHLORIDE SERPL-SCNC: 105 MMOL/L — SIGNIFICANT CHANGE UP (ref 96–108)
CHOLEST SERPL-MCNC: 157 MG/DL — SIGNIFICANT CHANGE UP (ref 10–199)
CO2 SERPL-SCNC: 23 MMOL/L — SIGNIFICANT CHANGE UP (ref 22–31)
CREAT SERPL-MCNC: 0.99 MG/DL — SIGNIFICANT CHANGE UP (ref 0.5–1.3)
EOSINOPHIL # BLD AUTO: 0.01 K/UL — SIGNIFICANT CHANGE UP (ref 0–0.5)
EOSINOPHIL NFR BLD AUTO: 0.1 % — SIGNIFICANT CHANGE UP (ref 0–6)
GLUCOSE SERPL-MCNC: 143 MG/DL — HIGH (ref 70–99)
HCT VFR BLD CALC: 47.6 % — SIGNIFICANT CHANGE UP (ref 39–50)
HCT VFR BLD CALC: 47.9 % — SIGNIFICANT CHANGE UP (ref 39–50)
HCV AB S/CO SERPL IA: 0.16 S/CO — SIGNIFICANT CHANGE UP (ref 0–0.99)
HCV AB SERPL-IMP: SIGNIFICANT CHANGE UP
HDLC SERPL-MCNC: 33 MG/DL — LOW
HGB BLD-MCNC: 16.1 G/DL — SIGNIFICANT CHANGE UP (ref 13–17)
HGB BLD-MCNC: 16.2 G/DL — SIGNIFICANT CHANGE UP (ref 13–17)
IMM GRANULOCYTES NFR BLD AUTO: 0.6 % — SIGNIFICANT CHANGE UP (ref 0–1.5)
LIPID PNL WITH DIRECT LDL SERPL: 89 MG/DL — SIGNIFICANT CHANGE UP
LYMPHOCYTES # BLD AUTO: 0.74 K/UL — LOW (ref 1–3.3)
LYMPHOCYTES # BLD AUTO: 7.8 % — LOW (ref 13–44)
MCHC RBC-ENTMCNC: 30.7 PG — SIGNIFICANT CHANGE UP (ref 27–34)
MCHC RBC-ENTMCNC: 30.9 PG — SIGNIFICANT CHANGE UP (ref 27–34)
MCHC RBC-ENTMCNC: 33.6 GM/DL — SIGNIFICANT CHANGE UP (ref 32–36)
MCHC RBC-ENTMCNC: 34 GM/DL — SIGNIFICANT CHANGE UP (ref 32–36)
MCV RBC AUTO: 90.8 FL — SIGNIFICANT CHANGE UP (ref 80–100)
MCV RBC AUTO: 91.4 FL — SIGNIFICANT CHANGE UP (ref 80–100)
MONOCYTES # BLD AUTO: 0.05 K/UL — SIGNIFICANT CHANGE UP (ref 0–0.9)
MONOCYTES NFR BLD AUTO: 0.5 % — LOW (ref 2–14)
NEUTROPHILS # BLD AUTO: 8.6 K/UL — HIGH (ref 1.8–7.4)
NEUTROPHILS NFR BLD AUTO: 90.9 % — HIGH (ref 43–77)
PLATELET # BLD AUTO: 168 K/UL — SIGNIFICANT CHANGE UP (ref 150–400)
PLATELET # BLD AUTO: 218 K/UL — SIGNIFICANT CHANGE UP (ref 150–400)
POTASSIUM SERPL-MCNC: 4.4 MMOL/L — SIGNIFICANT CHANGE UP (ref 3.5–5.3)
POTASSIUM SERPL-SCNC: 4.4 MMOL/L — SIGNIFICANT CHANGE UP (ref 3.5–5.3)
RBC # BLD: 5.24 M/UL — SIGNIFICANT CHANGE UP (ref 4.2–5.8)
RBC # BLD: 5.24 M/UL — SIGNIFICANT CHANGE UP (ref 4.2–5.8)
RBC # FLD: 12.7 % — SIGNIFICANT CHANGE UP (ref 10.3–14.5)
RBC # FLD: 12.7 % — SIGNIFICANT CHANGE UP (ref 10.3–14.5)
SODIUM SERPL-SCNC: 141 MMOL/L — SIGNIFICANT CHANGE UP (ref 135–145)
TOTAL CHOLESTEROL/HDL RATIO MEASUREMENT: 4.8 RATIO — SIGNIFICANT CHANGE UP (ref 3.4–9.6)
TRIGL SERPL-MCNC: 174 MG/DL — HIGH (ref 10–149)
TROPONIN T, HIGH SENSITIVITY RESULT: 83 NG/L — HIGH (ref 0–51)
TSH SERPL-MCNC: 0.71 UIU/ML — SIGNIFICANT CHANGE UP (ref 0.27–4.2)
WBC # BLD: 9.23 K/UL — SIGNIFICANT CHANGE UP (ref 3.8–10.5)
WBC # BLD: 9.47 K/UL — SIGNIFICANT CHANGE UP (ref 3.8–10.5)
WBC # FLD AUTO: 9.23 K/UL — SIGNIFICANT CHANGE UP (ref 3.8–10.5)
WBC # FLD AUTO: 9.47 K/UL — SIGNIFICANT CHANGE UP (ref 3.8–10.5)

## 2019-04-23 PROCEDURE — 93306 TTE W/DOPPLER COMPLETE: CPT | Mod: 26

## 2019-04-23 PROCEDURE — 93010 ELECTROCARDIOGRAM REPORT: CPT

## 2019-04-23 PROCEDURE — 99152 MOD SED SAME PHYS/QHP 5/>YRS: CPT | Mod: 59

## 2019-04-23 PROCEDURE — 93458 L HRT ARTERY/VENTRICLE ANGIO: CPT | Mod: 26

## 2019-04-23 PROCEDURE — 99233 SBSQ HOSP IP/OBS HIGH 50: CPT

## 2019-04-23 RX ORDER — RIVAROXABAN 15 MG-20MG
20 KIT ORAL EVERY 24 HOURS
Qty: 0 | Refills: 0 | Status: DISCONTINUED | OUTPATIENT
Start: 2019-04-24 | End: 2019-04-24

## 2019-04-23 RX ADMIN — Medication 50 MILLIGRAM(S): at 00:00

## 2019-04-23 RX ADMIN — Medication 50 MILLIGRAM(S): at 06:37

## 2019-04-23 RX ADMIN — ATORVASTATIN CALCIUM 80 MILLIGRAM(S): 80 TABLET, FILM COATED ORAL at 21:24

## 2019-04-23 RX ADMIN — HEPARIN SODIUM 1200 UNIT(S)/HR: 5000 INJECTION INTRAVENOUS; SUBCUTANEOUS at 01:26

## 2019-04-23 RX ADMIN — RANOLAZINE 500 MILLIGRAM(S): 500 TABLET, FILM COATED, EXTENDED RELEASE ORAL at 06:38

## 2019-04-23 RX ADMIN — PRASUGREL 10 MILLIGRAM(S): 5 TABLET, FILM COATED ORAL at 12:07

## 2019-04-23 RX ADMIN — BISOPROLOL FUMARATE 10 MILLIGRAM(S): 10 TABLET, FILM COATED ORAL at 09:08

## 2019-04-23 RX ADMIN — LISINOPRIL 10 MILLIGRAM(S): 2.5 TABLET ORAL at 06:37

## 2019-04-23 RX ADMIN — Medication 81 MILLIGRAM(S): at 12:07

## 2019-04-23 RX ADMIN — HEPARIN SODIUM 1200 UNIT(S)/HR: 5000 INJECTION INTRAVENOUS; SUBCUTANEOUS at 11:31

## 2019-04-23 NOTE — DISCHARGE NOTE PROVIDER - NSDCCPCAREPLAN_GEN_ALL_CORE_FT
PRINCIPAL DISCHARGE DIAGNOSIS  Diagnosis: Chest pain  Assessment and Plan of Treatment: Cardiac catheterization or coronary angiogram is done to understand how the heart is working and to look at the coronary arteries which supply oxygen to the heart muscles, to look at the heart chambers and the valves in the heart.  The doctor uses your groin or your arm to do the procedure  Your doctor will let you know when you can drive and do your usual physical activities  You will need to see your doctor within one week after discharge  Call your doctor if the insertion site bleeds a lot, if you get a fever or have pain, swelling, or redness where the tube went in, or if your leg feels weak, numb, or cold        SECONDARY DISCHARGE DIAGNOSES  Diagnosis: Atrial fibrillation  Assessment and Plan of Treatment: Atrial fibrillation is the most common heart rhythm problem & has the risk of stroke & heart attack  It helps if you control your blood pressure, not drink more than 1-2 alcohol drinks per day, cut down on caffeine, getting treatment for over active thyroid gland, & getting exercise  Call your doctor if you feel your heart racing or beating unusually, chest tightness or pain, lightheaded, faint, shortness of breath especially with exercise  It is important to take your heart medication as prescribed.   You are on anticoagulation for stroke prevention with Xarelto.   Xarelto/Rivaroxaban is used to thin the blood so clots will not form and to keep existing ones from getting bigger.  Take this medication daily as prescribed by your health care provider.  Take this medication with food to prevent upset stomach.  If you miss a dose call your health care provider or pharmacist right away.  Tell your doctor you use this drug before you have a spinal or epidural procedure  Tell dentists, surgeon, and other doctors that you use this drug.  You may bleed more easily.  Be careful and avoid injury.  Use a soft toothbrush and an electric razor. PRINCIPAL DISCHARGE DIAGNOSIS  Diagnosis: Chest pain  Assessment and Plan of Treatment: Cardiac catheterization or coronary angiogram is done to understand how the heart is working and to look at the coronary arteries which supply oxygen to the heart muscles, to look at the heart chambers and the valves in the heart.  The doctor uses your groin or your arm to do the procedure  Your doctor will let you know when you can drive and do your usual physical activities  You will need to see your doctor within one week after discharge  Call your doctor if the insertion site bleeds a lot, if you get a fever or have pain, swelling, or redness where the tube went in, or if your leg feels weak, numb, or cold.   Follow up with your cardiologist within 1-2 weeks of discharge for ongoing management.         SECONDARY DISCHARGE DIAGNOSES  Diagnosis: Atrial fibrillation  Assessment and Plan of Treatment: Atrial fibrillation is the most common heart rhythm problem & has the risk of stroke & heart attack  It helps if you control your blood pressure, not drink more than 1-2 alcohol drinks per day, cut down on caffeine, getting treatment for over active thyroid gland, & getting exercise  Call your doctor if you feel your heart racing or beating unusually, chest tightness or pain, lightheaded, faint, shortness of breath especially with exercise  It is important to take your heart medication as prescribed.   You are on anticoagulation for stroke prevention with Xarelto.   Xarelto/Rivaroxaban is used to thin the blood so clots will not form and to keep existing ones from getting bigger.  Take this medication daily as prescribed by your health care provider.  Take this medication with food to prevent upset stomach.  If you miss a dose call your health care provider or pharmacist right away.  Tell your doctor you use this drug before you have a spinal or epidural procedure  Tell dentists, surgeon, and other doctors that you use this drug.  You may bleed more easily.  Be careful and avoid injury.  Use a soft toothbrush and an electric razor.

## 2019-04-23 NOTE — PROGRESS NOTE ADULT - SUBJECTIVE AND OBJECTIVE BOX
HPI:  No further PAF since spontaneous cardioversion yesterday.  No further chest pain.  Premedicated for LHC today.    Review Of Systems:           Respiratory: No shortness of breath, cough, or wheezing  Cardiovascular: No chest pain or palpitations  10 point review of systems is otherwise negative except as mentioned above        Medications:  aspirin enteric coated 81 milliGRAM(s) Oral daily  atorvastatin 80 milliGRAM(s) Oral at bedtime  bisoprolol   Tablet 10 milliGRAM(s) Oral daily  chlorhexidine 4% Liquid 1 Application(s) Topical once  diphenhydrAMINE 50 milliGRAM(s) Oral once  lisinopril 10 milliGRAM(s) Oral daily  prasugrel 10 milliGRAM(s) Oral daily  predniSONE   Tablet 50 milliGRAM(s) Oral once  ranolazine 500 milliGRAM(s) Oral two times a day    PAST MEDICAL & SURGICAL HISTORY:  Ankle fracture  Melanoma  MI (myocardial infarction):   Asthma  H/O heart artery stent: ,   HTN (hypertension)  Hypercholesteremia  History of coronary artery stent placement: , ,   Melanoma: removal  S/P angioplasty with stent  No Past Surgical History    Vitals:  T(C): 36.7 (19 @ 03:46), Max: 37 (19 @ 21:29)  HR: 60 (19 @ 03:46) (60 - 61)  BP: 147/79 (19 @ 03:46) (147/78 - 176/87)  BP(mean): --  RR: 18 (19 @ 03:46) (16 - 18)  SpO2: 97% (19 @ 03:46) (96% - 98%)  Wt(kg): --  Daily     Daily   I&O's Summary    2019 07:01  -  2019 07:00  --------------------------------------------------------  IN: 372 mL / OUT: 1200 mL / NET: -828 mL        Physical Exam:  Appearance: No acute distress; well appearing  Eyes: PERRL, EOMI, pink conjunctiva  HENT: Normal oral mucosa  Cardiovascular: RRR, S1, S2, no murmurs, rubs, or gallops; no edema; no JVD  Respiratory: Clear to auscultation bilaterally  Gastrointestinal: soft, non-tender, non-distended with normal bowel sounds  Musculoskeletal: No clubbing; no joint deformity   Neurologic: Non-focal  Lymphatic: No lymphadenopathy  Psychiatry: AAOx3, mood & affect appropriate  Skin: No rashes, ecchymoses, or cyanosis                          16.1   9.47  )-----------( 218      ( 2019 16:36 )             47.9         141  |  105  |  14  ----------------------------<  143<H>  4.4   |  23  |  0.99    Ca    9.3      2019 05:29  Phos  3.3       Mg     1.8         TPro  6.9  /  Alb  4.1  /  TBili  0.7  /  DBili  x   /  AST  26  /  ALT  33  /  AlkPhos  118      PT/INR - ( 2019 08:00 )   PT: 11.3 sec;   INR: 0.99 ratio         PTT - ( 2019 11:05 )  PTT:54.1 sec      Serum Pro-Brain Natriuretic Peptide: 298 pg/mL ( @ 08:00)    Total Cholesterol: 157  LDL: 89  HDL: 33  T        Echo: < from: TTE with Doppler (w/Cont) (19 @ 08:31) >  EF (Visual Estimate): 45 %  ------------------------------------------------------------------------  Observations:  Mitral Valve: Thickened mitral valve. Mild mitral  regurgitation.  Aortic Valve/Aorta: Normal trileaflet aortic valve.  Aortic Root: 3.7 cm.  Left Atrium: Normal left atrium.  LA volume index = 30  cc/m2.  Left Ventricle: The inferolateral wall land the infeior  wall are hypokietic.  Endocardial visualization enhanced  with intravenous injection of Ultrasonic Enhancing Agent  (Definity). Normal left ventricular internal dimensions and  wall thicknesses. Mild diastolic dysfunction (Stage I).  Right Heart: Normal right atrium.Normal right ventricular  size and function. Normal tricuspid valve. Normal pulmonic  valve.  Pericardium/Pleura: Normal pericardium with no pericardial  effusion.  Hemodynamic: Estimated right atrial pressure is 8 mm Hg.  ------------------------------------------------------------------------  Conclusions:  1. The inferolateral wall land the infeior wall are  hypokietic.  Endocardial visualization enhanced with  intravenous injection of Ultrasonic Enhancing Agent  (Definity).  2. Normal right ventricular size and function.  *** No previous Echo exam.  ------------------------------------------------------------------------  Confirmed on  2019 - 16:30:36 by Katharina Turner M.D.  ------------------------------------------------------------------------    < end of copied text >    Interpretation of Telemetry: WATSON
JOVANI VIVEROS  63y Male  MRN:[id number]    Patient is a 63y old  Male who presents with a chief complaint of chest pain (22 Apr 2019 14:46)    HPI:  62 y/o male h/o CAD s/p multiple stents in the past last in october presenting with chest pain. had mild pain when first woke up, pain located in middle of the chest with radiation to both arms. took a baby aspirin and nitro with small amount of relief. however pain re-occured. pain worsening located in the middle of the chest and radiates to both arms. no fevers or chills. no h/o afib. no black or bloody stools. pain similar to previous cardiac pain. does report mild pain with inspiration. no back pain. no vomiting   7 stents previously. On effient and asa daily, compliant on medications. Social EtoH, occasional cigars. Drinks coffee, no other caffeine.    in er, pt was in afib with rvr, but self converted to nsr (22 Apr 2019 13:21)      Patient seen and evaluated at bedside. No acute events overnight except as noted.    Interval HPI: no events o/n. no chest pain  TELE: NSR    PAST MEDICAL & SURGICAL HISTORY:  Ankle fracture  Melanoma  MI (myocardial infarction): 2008  Asthma  H/O heart artery stent: 2008, 2012  HTN (hypertension)  Hypercholesteremia  History of coronary artery stent placement: 2007, 2012, 2013  Melanoma: removal  S/P angioplasty with stent  No Past Surgical History      REVIEW OF SYSTEMS:  Constitutional: No fever, chills, fatigue or weight loss.  Skin: No rash.  Eyes: No recent vision problems or eye pain.  ENT: No congestion, ear pain, or sore throat.  Endocrine: No thyroid problems.  Cardiovascular: No chest pain or palpation.  Respiratory: No cough, shortness of breath, congestion, or wheezing.  Gastrointestinal: No abdominal pain, nausea, vomiting, or diarrhea.  Genitourinary: No dysuria.  Musculoskeletal: No joint swelling.  Neurologic: No headache.    VITALS:  Vital Signs Last 24 Hrs  T(C): 36.7 (23 Apr 2019 03:46), Max: 37 (22 Apr 2019 21:29)  T(F): 98 (23 Apr 2019 03:46), Max: 98.6 (22 Apr 2019 21:29)  HR: 60 (23 Apr 2019 03:46) (56 - 61)  BP: 147/79 (23 Apr 2019 03:46) (129/81 - 176/87)  BP(mean): --  RR: 18 (23 Apr 2019 03:46) (16 - 18)  SpO2: 97% (23 Apr 2019 03:46) (96% - 99%)  CAPILLARY BLOOD GLUCOSE        I&O's Summary    22 Apr 2019 07:01  -  23 Apr 2019 07:00  --------------------------------------------------------  IN: 372 mL / OUT: 1200 mL / NET: -828 mL        PHYSICAL EXAM:  GENERAL: NAD, well-developed  HEAD:  Atraumatic, Normocephalic  EYES: EOMI, PERRLA, conjunctiva and sclera clear  NECK: Supple, No JVD  CHEST/LUNG: Clear to auscultation bilaterally; No wheeze  HEART: S1, S2; No murmurs, rubs, or gallops  ABDOMEN: Soft, Nontender, Nondistended; Bowel sounds present  EXTREMITIES:  2+ Peripheral Pulses, No clubbing, cyanosis, or edema  PSYCH: Normal affect  NEUROLOGY: AAOX3; non-focal  SKIN: No rashes or lesions    Consultant(s) Notes Reviewed:  [x ] YES  [ ] NO  Care Discussed with Consultants/Other Providers [ x] YES  [ ] NO    MEDS:  MEDICATIONS  (STANDING):  aspirin enteric coated 81 milliGRAM(s) Oral daily  atorvastatin 80 milliGRAM(s) Oral at bedtime  bisoprolol   Tablet 10 milliGRAM(s) Oral daily  chlorhexidine 4% Liquid 1 Application(s) Topical once  diphenhydrAMINE 50 milliGRAM(s) Oral once  heparin  Infusion.  Unit(s)/Hr (10 mL/Hr) IV Continuous <Continuous>  lisinopril 10 milliGRAM(s) Oral daily  prasugrel 10 milliGRAM(s) Oral daily  predniSONE   Tablet 50 milliGRAM(s) Oral once  ranolazine 500 milliGRAM(s) Oral two times a day    MEDICATIONS  (PRN):  heparin  Injectable 6000 Unit(s) IV Push every 6 hours PRN For aPTT less than 40    ALLERGIES:  contrast dye- itch/rash (Other)  penicillin (Anaphylaxis)  Shrimp (Anaphylaxis)      LABS:                        16.2   9.23  )-----------( 168      ( 23 Apr 2019 09:05 )             47.6     04-23    141  |  105  |  14  ----------------------------<  143<H>  4.4   |  23  |  0.99    Ca    9.3      23 Apr 2019 05:29  Phos  3.3     04-22  Mg     1.8     04-22    TPro  6.9  /  Alb  4.1  /  TBili  0.7  /  DBili  x   /  AST  26  /  ALT  33  /  AlkPhos  118  04-22    PT/INR - ( 22 Apr 2019 08:00 )   PT: 11.3 sec;   INR: 0.99 ratio         PTT - ( 23 Apr 2019 11:05 )  PTT:54.1 sec      LIVER FUNCTIONS - ( 22 Apr 2019 08:00 )  Alb: 4.1 g/dL / Pro: 6.9 g/dL / ALK PHOS: 118 U/L / ALT: 33 U/L / AST: 26 U/L / GGT: x             TSH: Thyroid Stimulating Hormone, Serum: 0.71 uIU/mL (04-23 @ 09:52)

## 2019-04-23 NOTE — DISCHARGE NOTE PROVIDER - NSDCACTIVITY_GEN_ALL_CORE
Walking - Outdoors allowed/No heavy lifting/straining/Showering allowed/Stairs allowed/Driving allowed/Walking - Indoors allowed

## 2019-04-23 NOTE — DISCHARGE NOTE PROVIDER - CARE PROVIDER_API CALL
Anish Colón (MD)  Cardiovascular Disease; Internal Medicine  [address] [facility] [address]  [place] [address]  Phone: [phone]  Fax: [phone]  Follow Up Time: 1 week

## 2019-04-23 NOTE — PROGRESS NOTE ADULT - ASSESSMENT
63 year-old with known significant CAD status post multiple PCI (most recently October 2018) who presents with his angina, found to be in new onset atrial fibrillation with RVR, subsequently spontaneously converted to NSR with nitroglycerin, now noted to have rising high sensitivity troponin (from 14 - 74 ng/L).  Now status post [facility] showing patent stents with  as previously noted.    Discontinue ASA.  Continue prasugrel and start Xarelto 20 mg daily tomorrow AM.  Continue high intensity statin.  Continue beta-blocker.    Suspect PAMELA - which can be evaluated as outpatient.    Discharge planning for 4/24/2019.
62 yo male h/o cad s/p pci x7, mi, htn, chol, a/w chest pain, afib with rvr, and NSTEMI    chest pain with positive trop  nstemi  cards f/u  trend trops  tele  echo  plan for cardiac cath today.  premeds given due to contrast allergy  cont asa/effient/statin  hep gtt    afib with rvr   self converted to nsr  cont hep gtt  tele monitor  cont BB       htn  bp acceptable  cont home meds    chol  cont statin      cont other home meds

## 2019-04-23 NOTE — DISCHARGE NOTE PROVIDER - HOSPITAL COURSE
64 y/o M w/ PMHx of CAD s/p 7 stents in the past last in october presenting with chest pain. had mild pain when first woke up, pain located in middle of the chest with radiation to both arms. took a baby aspirin and nitro with small amount of relief. however pain re-occured. pain worsening located in the middle of the chest and radiates to both arms. no fevers or chills. no h/o afib. no black or bloody stools. pain similar to previous cardiac pain. does report mild pain with inspiration. no back pain. no vomiting    Pt admitted for NSTEMI and treated with heparin gtt.     Now status post [facility] showing patent stents with  as previously noted.    Discontinue ASA.    Continue prasugrel and start Xarelto 20 mg daily tomorrow AM.    Continue high intensity statin.    Continue beta-blocker.        CP has resolved with no organic causes for CP. Pt to be discharged on Xarelto and Effient and off ASA 81mg. 64 y/o M w/ PMHx of CAD s/p 7 stents in the past last in october presenting with chest pain. had mild pain when first woke up, pain located in middle of the chest with radiation to both arms. took a baby aspirin and nitro with small amount of relief. however pain re-occured. pain worsening located in the middle of the chest and radiates to both arms. no fevers or chills. no h/o afib. no black or bloody stools. pain similar to previous cardiac pain. does report mild pain with inspiration. no back pain. no vomiting    Pt admitted for NSTEMI and treated with heparin gtt.     Now status post [facility] showing patent stents with  as previously noted.    Discontinue ASA.    Continue prasugrel and start Xarelto 20 mg daily tomorrow AM.    Continue high intensity statin.    Continue beta-blocker.    Pt had burst of pAF overnight x 2s with HR up to 170s. Started Xarelto s/p event. Later HR trended down to mid 40s. Pt asymptomatic during events. Cleared by cardiology for discharge.     CP has resolved with no organic causes for CP. Pt to be discharged on Xarelto and Effient and off ASA 81mg.     Patient medically stable for discharge home.

## 2019-04-23 NOTE — DISCHARGE NOTE PROVIDER - INSTRUCTIONS
No fluid restrictions. Follow low sodium/salt and low cholesterol/fat diet. Avoid added salt, frozen dinners, canned soups and broths, foods fried in oil, salted/cured meats including ham, haider, and other deli meats high in sodium. Eat plenty of fruits, vegetables and whole grains.

## 2019-04-24 ENCOUNTER — INBOUND DOCUMENT (OUTPATIENT)
Age: 64
End: 2019-04-24

## 2019-04-24 ENCOUNTER — TRANSCRIPTION ENCOUNTER (OUTPATIENT)
Age: 64
End: 2019-04-24

## 2019-04-24 VITALS
DIASTOLIC BLOOD PRESSURE: 87 MMHG | TEMPERATURE: 98 F | RESPIRATION RATE: 18 BRPM | SYSTOLIC BLOOD PRESSURE: 147 MMHG | OXYGEN SATURATION: 93 % | HEART RATE: 74 BPM

## 2019-04-24 PROCEDURE — 83880 ASSAY OF NATRIURETIC PEPTIDE: CPT

## 2019-04-24 PROCEDURE — 84295 ASSAY OF SERUM SODIUM: CPT

## 2019-04-24 PROCEDURE — 82330 ASSAY OF CALCIUM: CPT

## 2019-04-24 PROCEDURE — 80048 BASIC METABOLIC PNL TOTAL CA: CPT

## 2019-04-24 PROCEDURE — 82803 BLOOD GASES ANY COMBINATION: CPT

## 2019-04-24 PROCEDURE — 85014 HEMATOCRIT: CPT

## 2019-04-24 PROCEDURE — 99285 EMERGENCY DEPT VISIT HI MDM: CPT | Mod: 25

## 2019-04-24 PROCEDURE — 93005 ELECTROCARDIOGRAM TRACING: CPT

## 2019-04-24 PROCEDURE — 84484 ASSAY OF TROPONIN QUANT: CPT

## 2019-04-24 PROCEDURE — 82435 ASSAY OF BLOOD CHLORIDE: CPT

## 2019-04-24 PROCEDURE — 80053 COMPREHEN METABOLIC PANEL: CPT

## 2019-04-24 PROCEDURE — 71045 X-RAY EXAM CHEST 1 VIEW: CPT

## 2019-04-24 PROCEDURE — 83735 ASSAY OF MAGNESIUM: CPT

## 2019-04-24 PROCEDURE — 84443 ASSAY THYROID STIM HORMONE: CPT

## 2019-04-24 PROCEDURE — 85379 FIBRIN DEGRADATION QUANT: CPT

## 2019-04-24 PROCEDURE — 85730 THROMBOPLASTIN TIME PARTIAL: CPT

## 2019-04-24 PROCEDURE — C1769: CPT

## 2019-04-24 PROCEDURE — 99152 MOD SED SAME PHYS/QHP 5/>YRS: CPT

## 2019-04-24 PROCEDURE — 80061 LIPID PANEL: CPT

## 2019-04-24 PROCEDURE — 84100 ASSAY OF PHOSPHORUS: CPT

## 2019-04-24 PROCEDURE — C1887: CPT

## 2019-04-24 PROCEDURE — 82947 ASSAY GLUCOSE BLOOD QUANT: CPT

## 2019-04-24 PROCEDURE — 85027 COMPLETE CBC AUTOMATED: CPT

## 2019-04-24 PROCEDURE — C1894: CPT

## 2019-04-24 PROCEDURE — 93458 L HRT ARTERY/VENTRICLE ANGIO: CPT

## 2019-04-24 PROCEDURE — 84132 ASSAY OF SERUM POTASSIUM: CPT

## 2019-04-24 PROCEDURE — 83605 ASSAY OF LACTIC ACID: CPT

## 2019-04-24 PROCEDURE — C8929: CPT

## 2019-04-24 PROCEDURE — 86803 HEPATITIS C AB TEST: CPT

## 2019-04-24 PROCEDURE — 85610 PROTHROMBIN TIME: CPT

## 2019-04-24 RX ORDER — RIVAROXABAN 15 MG-20MG
1 KIT ORAL
Qty: 30 | Refills: 0
Start: 2019-04-24 | End: 2019-05-23

## 2019-04-24 RX ADMIN — RIVAROXABAN 20 MILLIGRAM(S): KIT at 03:45

## 2019-04-24 RX ADMIN — RANOLAZINE 500 MILLIGRAM(S): 500 TABLET, FILM COATED, EXTENDED RELEASE ORAL at 05:30

## 2019-04-24 RX ADMIN — BISOPROLOL FUMARATE 10 MILLIGRAM(S): 10 TABLET, FILM COATED ORAL at 05:30

## 2019-04-24 RX ADMIN — LISINOPRIL 10 MILLIGRAM(S): 2.5 TABLET ORAL at 05:30

## 2019-04-24 NOTE — DISCHARGE NOTE NURSING/CASE MANAGEMENT/SOCIAL WORK - NSDCDPATPORTLINK_GEN_ALL_CORE
You can access the [facility] Patient Portal, offered by [facility], by registering with the following website: [email or web address]

## 2019-04-29 NOTE — ED ADULT NURSE NOTE - NSFALLRSKASSESSTYPE_ED_ALL_ED
Clinic Care Coordination Contact  Care Team Conversations    CC reviewed chart.  Patient and her Mother will be considering CC services and let us know.  Ivanna Martinez RN  Care Coordination       Initial (On Arrival)

## 2019-05-07 ENCOUNTER — NON-APPOINTMENT (OUTPATIENT)
Age: 64
End: 2019-05-07

## 2019-05-07 ENCOUNTER — APPOINTMENT (OUTPATIENT)
Dept: CARDIOLOGY | Facility: CLINIC | Age: 64
End: 2019-05-07
Payer: COMMERCIAL

## 2019-05-07 VITALS
SYSTOLIC BLOOD PRESSURE: 140 MMHG | HEART RATE: 62 BPM | WEIGHT: 222 LBS | OXYGEN SATURATION: 95 % | HEIGHT: 70 IN | BODY MASS INDEX: 31.78 KG/M2 | DIASTOLIC BLOOD PRESSURE: 80 MMHG

## 2019-05-07 PROCEDURE — 99215 OFFICE O/P EST HI 40 MIN: CPT

## 2019-05-07 PROCEDURE — 93000 ELECTROCARDIOGRAM COMPLETE: CPT

## 2019-05-07 RX ORDER — ATORVASTATIN CALCIUM 80 MG/1
80 TABLET, FILM COATED ORAL
Qty: 90 | Refills: 3 | Status: DISCONTINUED | COMMUNITY
Start: 2018-09-29 | End: 2019-05-07

## 2019-06-04 ENCOUNTER — APPOINTMENT (OUTPATIENT)
Dept: PULMONOLOGY | Facility: CLINIC | Age: 64
End: 2019-06-04
Payer: COMMERCIAL

## 2019-06-04 VITALS
HEART RATE: 67 BPM | HEIGHT: 70 IN | RESPIRATION RATE: 14 BRPM | WEIGHT: 221 LBS | DIASTOLIC BLOOD PRESSURE: 75 MMHG | OXYGEN SATURATION: 97 % | SYSTOLIC BLOOD PRESSURE: 113 MMHG | BODY MASS INDEX: 31.64 KG/M2

## 2019-06-04 LAB — POCT - HEMOGLOBIN (HGB), QUANTITATIVE, TRANSCUTANEOUS: 14.8

## 2019-06-04 PROCEDURE — 94729 DIFFUSING CAPACITY: CPT

## 2019-06-04 PROCEDURE — 94664 DEMO&/EVAL PT USE INHALER: CPT | Mod: 59

## 2019-06-04 PROCEDURE — 94750: CPT

## 2019-06-04 PROCEDURE — 94060 EVALUATION OF WHEEZING: CPT

## 2019-06-04 PROCEDURE — 88738 HGB QUANT TRANSCUTANEOUS: CPT

## 2019-06-04 PROCEDURE — 94726 PLETHYSMOGRAPHY LUNG VOLUMES: CPT

## 2019-06-04 PROCEDURE — 99204 OFFICE O/P NEW MOD 45 MIN: CPT | Mod: 25

## 2019-06-04 PROCEDURE — ZZZZZ: CPT

## 2019-06-06 NOTE — ASSESSMENT
[FreeTextEntry1] : Snoring daytime sleepiness nonrestorative sleep in setting of new-onset atrial fibrillation and coronary artery disease. Sleep apnea should be ruled out home sleep study to be arranged

## 2019-06-06 NOTE — PHYSICAL EXAM
[Normal Appearance] : normal appearance [General Appearance - Well Developed] : well developed [Well Groomed] : well groomed [No Deformities] : no deformities [General Appearance - Well Nourished] : well nourished [General Appearance - In No Acute Distress] : no acute distress [Normal Conjunctiva] : the conjunctiva exhibited no abnormalities [Normal Oropharynx] : abnormal oropharynx [Eyelids - No Xanthelasma] : the eyelids demonstrated no xanthelasmas [Enlarged Base of the Tongue] : enlargement of the base of the tongue [Elongated Uvula] : elongated uvula [Low Lying Soft Palate] : low lying soft palate [Neck Appearance] : the appearance of the neck was normal [Neck Cervical Mass (___cm)] : no neck mass was observed [Jugular Venous Distention Increased] : there was no jugular-venous distention [Thyroid Diffuse Enlargement] : the thyroid was not enlarged [Thyroid Nodule] : there were no palpable thyroid nodules [Heart Sounds] : normal S1 and S2 [Heart Rate And Rhythm] : heart rate and rhythm were normal [Murmurs] : no murmurs present [Exaggerated Use Of Accessory Muscles For Inspiration] : no accessory muscle use [Respiration, Rhythm And Depth] : normal respiratory rhythm and effort [Auscultation Breath Sounds / Voice Sounds] : lungs were clear to auscultation bilaterally [Abdomen Tenderness] : non-tender [Abdomen Soft] : soft [Abdomen Mass (___ Cm)] : no abdominal mass palpated [Gait - Sufficient For Exercise Testing] : the gait was sufficient for exercise testing [Abnormal Walk] : normal gait [Nail Clubbing] : no clubbing of the fingernails [Petechial Hemorrhages (___cm)] : no petechial hemorrhages [Cyanosis, Localized] : no localized cyanosis [Skin Color & Pigmentation] : normal skin color and pigmentation [] : no rash [Skin Turgor] : normal skin turgor [Deep Tendon Reflexes (DTR)] : deep tendon reflexes were 2+ and symmetric [Sensation] : the sensory exam was normal to light touch and pinprick [Oriented To Time, Place, And Person] : oriented to person, place, and time [No Focal Deficits] : no focal deficits [Impaired Insight] : insight and judgment were intact [Affect] : the affect was normal

## 2019-06-06 NOTE — PROCEDURE
[FreeTextEntry1] : PFT: FEV1, FVC, and FEV1/FVC are within normal limits. There was not a significant response to inhaled bronchodilator. TLC and subdivisions are normal. RV/TLC ratio is normal. Resistance and specific conductance are normal. Single breath diffusion capacity is normal.

## 2019-06-20 ENCOUNTER — APPOINTMENT (OUTPATIENT)
Dept: PULMONOLOGY | Facility: CLINIC | Age: 64
End: 2019-06-20
Payer: COMMERCIAL

## 2019-06-20 PROCEDURE — 95800 SLP STDY UNATTENDED: CPT

## 2019-06-21 PROCEDURE — 95800 SLP STDY UNATTENDED: CPT

## 2019-07-15 ENCOUNTER — APPOINTMENT (OUTPATIENT)
Dept: PULMONOLOGY | Facility: CLINIC | Age: 64
End: 2019-07-15
Payer: COMMERCIAL

## 2019-07-15 ENCOUNTER — MEDICATION RENEWAL (OUTPATIENT)
Age: 64
End: 2019-07-15

## 2019-07-15 PROCEDURE — 99213 OFFICE O/P EST LOW 20 MIN: CPT

## 2019-07-16 NOTE — ASSESSMENT
[FreeTextEntry1] : Moderate obstructive sleep apnea. Treatment options discussed CPAP versus oral appliance. Will prescribe auto titrating CPAP. Followup for data download and compliance assessment

## 2019-08-08 ENCOUNTER — RX RENEWAL (OUTPATIENT)
Age: 64
End: 2019-08-08

## 2019-08-26 ENCOUNTER — RX RENEWAL (OUTPATIENT)
Age: 64
End: 2019-08-26

## 2019-08-29 ENCOUNTER — NON-APPOINTMENT (OUTPATIENT)
Age: 64
End: 2019-08-29

## 2019-08-29 ENCOUNTER — APPOINTMENT (OUTPATIENT)
Dept: CARDIOLOGY | Facility: CLINIC | Age: 64
End: 2019-08-29
Payer: COMMERCIAL

## 2019-08-29 VITALS
WEIGHT: 222 LBS | SYSTOLIC BLOOD PRESSURE: 122 MMHG | HEART RATE: 54 BPM | BODY MASS INDEX: 31.78 KG/M2 | HEIGHT: 70 IN | DIASTOLIC BLOOD PRESSURE: 81 MMHG | OXYGEN SATURATION: 95 %

## 2019-08-29 PROCEDURE — 93000 ELECTROCARDIOGRAM COMPLETE: CPT

## 2019-08-29 PROCEDURE — 99203 OFFICE O/P NEW LOW 30 MIN: CPT

## 2019-09-03 ENCOUNTER — NON-APPOINTMENT (OUTPATIENT)
Age: 64
End: 2019-09-03

## 2019-09-11 ENCOUNTER — INPATIENT (INPATIENT)
Facility: HOSPITAL | Age: 64
LOS: 0 days | Discharge: HOME | DRG: 247 | End: 2019-09-12
Attending: INTERNAL MEDICINE | Admitting: INTERNAL MEDICINE
Payer: COMMERCIAL

## 2019-09-11 ENCOUNTER — TRANSCRIPTION ENCOUNTER (OUTPATIENT)
Age: 64
End: 2019-09-11

## 2019-09-11 VITALS
OXYGEN SATURATION: 97 % | HEIGHT: 70 IN | HEART RATE: 54 BPM | SYSTOLIC BLOOD PRESSURE: 155 MMHG | WEIGHT: 222.01 LBS | TEMPERATURE: 98 F | RESPIRATION RATE: 14 BRPM | DIASTOLIC BLOOD PRESSURE: 103 MMHG

## 2019-09-11 DIAGNOSIS — C43.9 MALIGNANT MELANOMA OF SKIN, UNSPECIFIED: Chronic | ICD-10-CM

## 2019-09-11 DIAGNOSIS — I25.10 ATHEROSCLEROTIC HEART DISEASE OF NATIVE CORONARY ARTERY WITHOUT ANGINA PECTORIS: ICD-10-CM

## 2019-09-11 DIAGNOSIS — Z95.5 PRESENCE OF CORONARY ANGIOPLASTY IMPLANT AND GRAFT: Chronic | ICD-10-CM

## 2019-09-11 LAB
ALBUMIN SERPL ELPH-MCNC: 4.1 G/DL — SIGNIFICANT CHANGE UP (ref 3.3–5)
ALP SERPL-CCNC: 92 U/L — SIGNIFICANT CHANGE UP (ref 40–120)
ALT FLD-CCNC: 24 U/L — SIGNIFICANT CHANGE UP (ref 10–45)
ANION GAP SERPL CALC-SCNC: 13 MMOL/L — SIGNIFICANT CHANGE UP (ref 5–17)
AST SERPL-CCNC: 17 U/L — SIGNIFICANT CHANGE UP (ref 10–40)
BILIRUB SERPL-MCNC: 0.6 MG/DL — SIGNIFICANT CHANGE UP (ref 0.2–1.2)
BLD GP AB SCN SERPL QL: NEGATIVE — SIGNIFICANT CHANGE UP
BUN SERPL-MCNC: 18 MG/DL — SIGNIFICANT CHANGE UP (ref 7–23)
CALCIUM SERPL-MCNC: 9.7 MG/DL — SIGNIFICANT CHANGE UP (ref 8.4–10.5)
CHLORIDE SERPL-SCNC: 106 MMOL/L — SIGNIFICANT CHANGE UP (ref 96–108)
CO2 SERPL-SCNC: 23 MMOL/L — SIGNIFICANT CHANGE UP (ref 22–31)
CREAT SERPL-MCNC: 1.16 MG/DL — SIGNIFICANT CHANGE UP (ref 0.5–1.3)
GLUCOSE SERPL-MCNC: 103 MG/DL — HIGH (ref 70–99)
HCT VFR BLD CALC: 45.5 % — SIGNIFICANT CHANGE UP (ref 39–50)
HGB BLD-MCNC: 15.1 G/DL — SIGNIFICANT CHANGE UP (ref 13–17)
MCHC RBC-ENTMCNC: 29.5 PG — SIGNIFICANT CHANGE UP (ref 27–34)
MCHC RBC-ENTMCNC: 33.2 GM/DL — SIGNIFICANT CHANGE UP (ref 32–36)
MCV RBC AUTO: 89 FL — SIGNIFICANT CHANGE UP (ref 80–100)
PLATELET # BLD AUTO: 175 K/UL — SIGNIFICANT CHANGE UP (ref 150–400)
POTASSIUM SERPL-MCNC: 4.5 MMOL/L — SIGNIFICANT CHANGE UP (ref 3.5–5.3)
POTASSIUM SERPL-SCNC: 4.5 MMOL/L — SIGNIFICANT CHANGE UP (ref 3.5–5.3)
PROT SERPL-MCNC: 7 G/DL — SIGNIFICANT CHANGE UP (ref 6–8.3)
RBC # BLD: 5.11 M/UL — SIGNIFICANT CHANGE UP (ref 4.2–5.8)
RBC # FLD: 12 % — SIGNIFICANT CHANGE UP (ref 10.3–14.5)
RH IG SCN BLD-IMP: POSITIVE — SIGNIFICANT CHANGE UP
SODIUM SERPL-SCNC: 142 MMOL/L — SIGNIFICANT CHANGE UP (ref 135–145)
WBC # BLD: 6.6 K/UL — SIGNIFICANT CHANGE UP (ref 3.8–10.5)
WBC # FLD AUTO: 6.6 K/UL — SIGNIFICANT CHANGE UP (ref 3.8–10.5)

## 2019-09-11 PROCEDURE — 93010 ELECTROCARDIOGRAM REPORT: CPT

## 2019-09-11 PROCEDURE — 92928 PRQ TCAT PLMT NTRAC ST 1 LES: CPT | Mod: LM,GC

## 2019-09-11 PROCEDURE — 99152 MOD SED SAME PHYS/QHP 5/>YRS: CPT | Mod: 59,GC

## 2019-09-11 PROCEDURE — 93010 ELECTROCARDIOGRAM REPORT: CPT | Mod: 77,76

## 2019-09-11 PROCEDURE — 92978 ENDOLUMINL IVUS OCT C 1ST: CPT | Mod: 26,LM,GC

## 2019-09-11 PROCEDURE — 92920 PRQ TRLUML C ANGIOP 1ART&/BR: CPT | Mod: RC,GC

## 2019-09-11 PROCEDURE — 92943 PRQ TRLUML REVSC CH OCC ANT: CPT | Mod: LC,GC

## 2019-09-11 RX ORDER — RIVAROXABAN 15 MG-20MG
20 KIT ORAL
Refills: 0 | Status: DISCONTINUED | OUTPATIENT
Start: 2019-09-12 | End: 2019-09-12

## 2019-09-11 RX ORDER — RANOLAZINE 500 MG/1
500 TABLET, FILM COATED, EXTENDED RELEASE ORAL
Refills: 0 | Status: DISCONTINUED | OUTPATIENT
Start: 2019-09-11 | End: 2019-09-11

## 2019-09-11 RX ORDER — ATORVASTATIN CALCIUM 80 MG/1
80 TABLET, FILM COATED ORAL AT BEDTIME
Refills: 0 | Status: DISCONTINUED | OUTPATIENT
Start: 2019-09-11 | End: 2019-09-12

## 2019-09-11 RX ORDER — BISOPROLOL FUMARATE 10 MG/1
10 TABLET, FILM COATED ORAL DAILY
Refills: 0 | Status: DISCONTINUED | OUTPATIENT
Start: 2019-09-11 | End: 2019-09-12

## 2019-09-11 RX ORDER — CHOLECALCIFEROL (VITAMIN D3) 125 MCG
2000 CAPSULE ORAL
Qty: 0 | Refills: 0 | DISCHARGE
Start: 2019-09-11

## 2019-09-11 RX ORDER — RANOLAZINE 500 MG/1
1 TABLET, FILM COATED, EXTENDED RELEASE ORAL
Qty: 0 | Refills: 0 | DISCHARGE

## 2019-09-11 RX ORDER — RANOLAZINE 500 MG/1
500 TABLET, FILM COATED, EXTENDED RELEASE ORAL ONCE
Refills: 0 | Status: COMPLETED | OUTPATIENT
Start: 2019-09-11 | End: 2019-09-11

## 2019-09-11 RX ORDER — HYDROCORTISONE 20 MG
200 TABLET ORAL ONCE
Refills: 0 | Status: COMPLETED | OUTPATIENT
Start: 2019-09-11 | End: 2019-09-11

## 2019-09-11 RX ORDER — PRASUGREL 5 MG/1
1 TABLET, FILM COATED ORAL
Qty: 30 | Refills: 11
Start: 2019-09-11 | End: 2020-09-04

## 2019-09-11 RX ORDER — RANOLAZINE 500 MG/1
1000 TABLET, FILM COATED, EXTENDED RELEASE ORAL
Refills: 0 | Status: DISCONTINUED | OUTPATIENT
Start: 2019-09-11 | End: 2019-09-12

## 2019-09-11 RX ORDER — PRASUGREL 5 MG/1
10 TABLET, FILM COATED ORAL DAILY
Refills: 0 | Status: DISCONTINUED | OUTPATIENT
Start: 2019-09-11 | End: 2019-09-12

## 2019-09-11 RX ORDER — CHOLECALCIFEROL (VITAMIN D3) 125 MCG
2000 CAPSULE ORAL DAILY
Refills: 0 | Status: DISCONTINUED | OUTPATIENT
Start: 2019-09-11 | End: 2019-09-12

## 2019-09-11 RX ORDER — NITROGLYCERIN 6.5 MG
1 CAPSULE, EXTENDED RELEASE ORAL
Qty: 0 | Refills: 0 | DISCHARGE

## 2019-09-11 RX ORDER — LISINOPRIL 2.5 MG/1
10 TABLET ORAL DAILY
Refills: 0 | Status: DISCONTINUED | OUTPATIENT
Start: 2019-09-11 | End: 2019-09-12

## 2019-09-11 RX ORDER — DIPHENHYDRAMINE HCL 50 MG
50 CAPSULE ORAL ONCE
Refills: 0 | Status: COMPLETED | OUTPATIENT
Start: 2019-09-11 | End: 2019-09-11

## 2019-09-11 RX ORDER — ASPIRIN/CALCIUM CARB/MAGNESIUM 324 MG
81 TABLET ORAL DAILY
Refills: 0 | Status: DISCONTINUED | OUTPATIENT
Start: 2019-09-11 | End: 2019-09-12

## 2019-09-11 RX ORDER — ACETAMINOPHEN 500 MG
650 TABLET ORAL ONCE
Refills: 0 | Status: DISCONTINUED | OUTPATIENT
Start: 2019-09-11 | End: 2019-09-12

## 2019-09-11 RX ADMIN — RANOLAZINE 500 MILLIGRAM(S): 500 TABLET, FILM COATED, EXTENDED RELEASE ORAL at 18:39

## 2019-09-11 RX ADMIN — Medication 50 MILLIGRAM(S): at 12:20

## 2019-09-11 RX ADMIN — RANOLAZINE 500 MILLIGRAM(S): 500 TABLET, FILM COATED, EXTENDED RELEASE ORAL at 17:39

## 2019-09-11 RX ADMIN — Medication 200 MILLIGRAM(S): at 10:12

## 2019-09-11 NOTE — DISCHARGE NOTE PROVIDER - HOSPITAL COURSE
64 y/o  male with PMHx of  CAD, MI, s/p multiple stents ( 7 stents) in the past, AFib, Asthma, last PCI  in October,  presented today for PCI of  of LAD.  Patient states he gets occasional chest pain, which wakes him up from sleep. Pain is midsternal, radiates to b/l arm, 5/10, not really relieved after the last stent placement. . Seen and evaluated by Dr. Colón and referred for Coronary angiogram. Patient is allergic to IV contrast not premedicated, d/w Dr. Esteves and gave IV prednisone as advised. Pt is now s/p LHC JUDY x 1 MM and JUDY x 1 pLAd; RCA 90% ISR s/p POBA access via RFA access. Pt tolerated the procedure well, site benign. Post-procedure discharge instructions discussed and questions addressed 64 y/o  male with PMHx of  CAD, MI, s/p multiple stents ( 7 stents) in the past, AFib, Asthma, last PCI  in October,  presented today for PCI of  of LAD.  Patient states he gets occasional chest pain, which wakes him up from sleep. Pain is midsternal, radiates to b/l arm, 5/10, not really relieved after the last stent placement. . Seen and evaluated by Dr. Colón and referred for Coronary angiogram. Patient is allergic to IV contrast not premedicated, d/w Dr. Esteves and gave IV prednisone as advised. Pt is now s/p LHC JUDY x 1 MM and JUDY x 1 pLAd; RCA 90% ISR s/p POBA access via RFA access. Pt tolerated the procedure well, site benign. Post-procedure discharge instructions discussed and questions addressed         Per Dr. Esteves/Aneudy-    Continue ASA, prasugrel and xarelto for 2 weeks and then stop ASA. After 2 weeks, the patient should continue with xarelto and prasugrel.    Continue Atorvastatin    Continue anti-anginal medications. Will titrate off antianginal therapy as outpatient.         Follow up at the [facility] clinic on 9/19/2019 at 8:00am    Patient okay for discharge home today-right groin site soft and intact. Patient aware of how to monitor changes in groin.

## 2019-09-11 NOTE — DISCHARGE NOTE PROVIDER - NSDCCPCAREPLAN_GEN_ALL_CORE_FT
PRINCIPAL DISCHARGE DIAGNOSIS  Diagnosis: CAD (coronary artery disease)  Assessment and Plan of Treatment: Pt remains chest pain free and understands post cath discharge instructions   No heavy lifting, strenuous activity, bending, straining or unnecessary stair climbing  for 2 weeks. No sex for 1 week.  No driving for 2 days. You may shower 24 hours following procedure but avoid baths and swimming for 1 week. Check groin site for bleeding and/or swelling daily following procedure. Call your doctor/cardiologist immediately should it occur or if you have increased/persistent pain at the site. Follow up with your cardiologist in 1- 2 weeks. You may call [facility] at [phone] or [phone] after office hours and weekends  with any questions or concerns following your procedure. Take medications as prescribed.

## 2019-09-11 NOTE — DISCHARGE NOTE PROVIDER - NSDCCPTREATMENT_GEN_ALL_CORE_FT
PRINCIPAL PROCEDURE  Procedure: Left heart catheterization  Findings and Treatment: JUDY x 1 LM, JUDY x 1 pLAD OM  s/p POBA RCA 90% ISR s/p POBA

## 2019-09-11 NOTE — DISCHARGE NOTE PROVIDER - PROVIDER TOKENS
GYN exam was done  Pap test was obtained  I will call her with results PROVIDER:[TOKEN:[[id number]]]

## 2019-09-11 NOTE — CHART NOTE - NSCHARTNOTEFT_GEN_A_CORE
FEMORAL Perclose ASSESSMENT NOTE    Right groin perclose in place with good hemostatis w/o any bleeding or hematoma  Pulses in the RIGHT lower extremity are palpable, ( right femoral and right pedal pulses + 2).  Right groin is soft/non tender  Patient denies leg/s pain or foot pain or chest pain  post 12 lead EKG WNL, no ST or T wave changes noted when compared to pre procedural EKG    Complications: None    Comments: patient is to remain bed rest for the next 2 hours. Pt in room. Needs to see IR next at 11 AM,. Needs directions.

## 2019-09-11 NOTE — CHART NOTE - NSCHARTNOTEFT_GEN_A_CORE
Patient underwent a PCI procedure and is being admitted as they are at increased risk for major adverse cardiac and vascular events if discharged due to the following high risk characteristics:  performed >2 vessel system PCI.

## 2019-09-11 NOTE — H&P CARDIOLOGY - PMH
Ankle fracture    Asthma    Atrial fibrillation  On Xarelto  H/O heart artery stent  2008, 2012  HTN (hypertension)    Hypercholesteremia    Melanoma    MI (myocardial infarction)  2008

## 2019-09-11 NOTE — H&P CARDIOLOGY - HISTORY OF PRESENT ILLNESS
62 y/o  male with PMHx of  CAD, MI, s/p multiple stents ( 7 stents) in the past, AFib, Asthma, last PCI  in October,  presented today for PCI of  of LAD.  Patient states he gets occasional chest pain, which wakes him up from sleep. Pain is midsternal, radiates to b/l arm, 5/10, not really relieved after the last stent placement. . Seen and evaluated by Dr. Colón and referred for Coronary angiogram.   Patient is allergic to IV contrast not premedicated, d/w Dr. Esteves and gave IV prednisone as advised.

## 2019-09-11 NOTE — DISCHARGE NOTE PROVIDER - NSDCFUADDINST_GEN_ALL_CORE_FT
No heavy lifting, strenuous activity, bending, straining, or unnecessary stair climbing for 2 weeks. No driving for 2 days. You may shower 24 hours following the procedure but avoid baths/swimming for 1 week. Check your groin site for bleeding and/or swelling daily following procedure and call your doctor immediately if it occurs or if you experience increased pain at the site. Follow up with your cardiologist on 9/19/19 as scheduled. Take aspirin, Xarelto, and Effient (prasugrel) as ordered; after 14 days, stop aspirin and continue xarelto and effient only.  You may call [facility] if you have any questions/concerns regarding your procedure [phone]

## 2019-09-12 ENCOUNTER — TRANSCRIPTION ENCOUNTER (OUTPATIENT)
Age: 64
End: 2019-09-12

## 2019-09-12 VITALS
OXYGEN SATURATION: 98 % | DIASTOLIC BLOOD PRESSURE: 81 MMHG | HEART RATE: 71 BPM | RESPIRATION RATE: 18 BRPM | SYSTOLIC BLOOD PRESSURE: 128 MMHG | TEMPERATURE: 98 F

## 2019-09-12 LAB
ANION GAP SERPL CALC-SCNC: 12 MMOL/L — SIGNIFICANT CHANGE UP (ref 5–17)
BUN SERPL-MCNC: 19 MG/DL — SIGNIFICANT CHANGE UP (ref 7–23)
CALCIUM SERPL-MCNC: 9.1 MG/DL — SIGNIFICANT CHANGE UP (ref 8.4–10.5)
CHLORIDE SERPL-SCNC: 105 MMOL/L — SIGNIFICANT CHANGE UP (ref 96–108)
CO2 SERPL-SCNC: 23 MMOL/L — SIGNIFICANT CHANGE UP (ref 22–31)
CREAT SERPL-MCNC: 1.08 MG/DL — SIGNIFICANT CHANGE UP (ref 0.5–1.3)
GLUCOSE SERPL-MCNC: 111 MG/DL — HIGH (ref 70–99)
HCT VFR BLD CALC: 42.6 % — SIGNIFICANT CHANGE UP (ref 39–50)
HGB BLD-MCNC: 14.5 G/DL — SIGNIFICANT CHANGE UP (ref 13–17)
MCHC RBC-ENTMCNC: 30.1 PG — SIGNIFICANT CHANGE UP (ref 27–34)
MCHC RBC-ENTMCNC: 34 GM/DL — SIGNIFICANT CHANGE UP (ref 32–36)
MCV RBC AUTO: 88.7 FL — SIGNIFICANT CHANGE UP (ref 80–100)
PLATELET # BLD AUTO: 166 K/UL — SIGNIFICANT CHANGE UP (ref 150–400)
POTASSIUM SERPL-MCNC: 3.9 MMOL/L — SIGNIFICANT CHANGE UP (ref 3.5–5.3)
POTASSIUM SERPL-SCNC: 3.9 MMOL/L — SIGNIFICANT CHANGE UP (ref 3.5–5.3)
RBC # BLD: 4.8 M/UL — SIGNIFICANT CHANGE UP (ref 4.2–5.8)
RBC # FLD: 12 % — SIGNIFICANT CHANGE UP (ref 10.3–14.5)
SODIUM SERPL-SCNC: 140 MMOL/L — SIGNIFICANT CHANGE UP (ref 135–145)
WBC # BLD: 9.6 K/UL — SIGNIFICANT CHANGE UP (ref 3.8–10.5)
WBC # FLD AUTO: 9.6 K/UL — SIGNIFICANT CHANGE UP (ref 3.8–10.5)

## 2019-09-12 PROCEDURE — 92978 ENDOLUMINL IVUS OCT C 1ST: CPT | Mod: LM

## 2019-09-12 PROCEDURE — 86900 BLOOD TYPING SEROLOGIC ABO: CPT

## 2019-09-12 PROCEDURE — C1725: CPT

## 2019-09-12 PROCEDURE — 86850 RBC ANTIBODY SCREEN: CPT

## 2019-09-12 PROCEDURE — C1894: CPT

## 2019-09-12 PROCEDURE — 85027 COMPLETE CBC AUTOMATED: CPT

## 2019-09-12 PROCEDURE — 92943 PRQ TRLUML REVSC CH OCC ANT: CPT | Mod: LC

## 2019-09-12 PROCEDURE — 92920 PRQ TRLUML C ANGIOP 1ART&/BR: CPT | Mod: RC

## 2019-09-12 PROCEDURE — 93005 ELECTROCARDIOGRAM TRACING: CPT

## 2019-09-12 PROCEDURE — C1769: CPT

## 2019-09-12 PROCEDURE — 99153 MOD SED SAME PHYS/QHP EA: CPT

## 2019-09-12 PROCEDURE — 86901 BLOOD TYPING SEROLOGIC RH(D): CPT

## 2019-09-12 PROCEDURE — 80053 COMPREHEN METABOLIC PANEL: CPT

## 2019-09-12 PROCEDURE — C1753: CPT

## 2019-09-12 PROCEDURE — 99152 MOD SED SAME PHYS/QHP 5/>YRS: CPT

## 2019-09-12 PROCEDURE — C1760: CPT

## 2019-09-12 PROCEDURE — C1874: CPT

## 2019-09-12 PROCEDURE — C1887: CPT

## 2019-09-12 PROCEDURE — C9600: CPT | Mod: LM

## 2019-09-12 PROCEDURE — 80048 BASIC METABOLIC PNL TOTAL CA: CPT

## 2019-09-12 PROCEDURE — 99238 HOSP IP/OBS DSCHRG MGMT 30/<: CPT

## 2019-09-12 RX ORDER — ASPIRIN/CALCIUM CARB/MAGNESIUM 324 MG
1 TABLET ORAL
Qty: 14 | Refills: 0
Start: 2019-09-12 | End: 2019-09-25

## 2019-09-12 RX ADMIN — Medication 81 MILLIGRAM(S): at 05:51

## 2019-09-12 RX ADMIN — PRASUGREL 10 MILLIGRAM(S): 5 TABLET, FILM COATED ORAL at 05:51

## 2019-09-12 RX ADMIN — RANOLAZINE 1000 MILLIGRAM(S): 500 TABLET, FILM COATED, EXTENDED RELEASE ORAL at 05:52

## 2019-09-12 RX ADMIN — BISOPROLOL FUMARATE 10 MILLIGRAM(S): 10 TABLET, FILM COATED ORAL at 05:52

## 2019-09-12 RX ADMIN — LISINOPRIL 10 MILLIGRAM(S): 2.5 TABLET ORAL at 05:51

## 2019-09-12 NOTE — PROGRESS NOTE ADULT - ASSESSMENT
63 y.o M CAD, MI, s/p multiple stents in the past, AFib, Asthma presented yesterday for LCX  PCI.      1. Stable angina with persistent symptoms despite multiple antianginal medication: Successful LCX  PCI s/p POBA, RCA PCI s/p POBA for in-stent restenosis, and LM to LAD PCI s/p 2 JUDY  Continue ASA, prasugrel and xarelto for 2 weeks and then stop ASA. After 2 weeks, the patient should continue with xarelto and prasugrel.  Continue Atorvastatin  Continue anti-anginal medications. Will titrate off antianginal therapy as outpatient.     Follow up at the [facility] clinic on 9/19/2019 at 8:00am

## 2019-09-12 NOTE — DISCHARGE NOTE NURSING/CASE MANAGEMENT/SOCIAL WORK - PATIENT PORTAL LINK FT
You can access the FollowMyHealth Patient Portal offered by [facility] by registering at the following website: [email or web address]. By joining [employer]’s FollowMyHealth portal, you will also be able to view your health information using other applications (apps) compatible with our system.

## 2019-09-12 NOTE — PROGRESS NOTE ADULT - SUBJECTIVE AND OBJECTIVE BOX
HPI:      Review Of Systems:  10 point review of systems is otherwise negative except as mentioned above              Medications:  acetaminophen   Tablet .. 650 milliGRAM(s) Oral once PRN  aspirin enteric coated 81 milliGRAM(s) Oral daily  atorvastatin 80 milliGRAM(s) Oral at bedtime  bisoprolol   Tablet 10 milliGRAM(s) Oral daily  cholecalciferol 2000 Unit(s) Oral daily  lisinopril 10 milliGRAM(s) Oral daily  prasugrel 10 milliGRAM(s) Oral daily  ranolazine 1000 milliGRAM(s) Oral two times a day  rivaroxaban 20 milliGRAM(s) Oral with dinner    PMH/PSH/FH/SH:  Vitals:  T(C): 36.8 (19 @ 09:14), Max: 37 (19 @ 04:26)  HR: 71 (19 @ 09:14) (54 - 79)  BP: 128/81 (19 @ 09:14) (128/81 - 166/87)  BP(mean): 120 (19 @ 09:35) (120 - 120)  RR: 18 (19 @ 09:14) (14 - 18)  SpO2: 98% (19 @ 09:14) (95% - 100%)  Wt(kg): --  Daily Height in cm: 177.8 (11 Sep 2019 15:45)    Daily Weight in k.7 (11 Sep 2019 15:45)  I&O's Summary    11 Sep 2019 07:01  -  12 Sep 2019 07:00  --------------------------------------------------------  IN: 540 mL / OUT: 0 mL / NET: 540 mL      Physical Exam:  Appearance: [ x] Normal [ x] NAD  Eyes: [ x] PERRL [x ] EOMI  HEENT: [x ] Normal oral mus[place] [x ]NC/AT  Cardiovascular: [x ] S1 [x ] S2 [ x] RRR [ x] No m/r/g  [x]No edema, No JVD  Respiratory: [x ] Clear to auscultation bilaterally  Gastrointestinal: [x ] Soft [x ] Non-tender [x ] Non-distended [x ] BS+  Musculoskeletal: [x ] No clubbing [x ] No joint deformity   Neurologic: [x ] Non-focal  Lymphatic: [x ] No lymphadenopathy  Psychiatry: [x ] AAOx3 [x ] Mood & affect appropriate  Skin: [x ] No rashes [x ] No ecchymoses [x ] No cyanosis        140  |  105  |  19  ----------------------------<  111<H>  3.9   |  23  |  1.08    Ca    9.1      12 Sep 2019 04:54    TPro  7.0  /  Alb  4.1  /  TBili  0.6  /  DBili  x   /  AST  17  /  ALT  24  /  AlkPhos  92  09-11      Interpretation of Telemetry: HPI:  Patient feels ok. No chest pain. No SOB. No access site bleeding or hematoma. Able to ambulate.     Review Of Systems:  10 point review of systems is otherwise negative except as mentioned above              Medications:  acetaminophen   Tablet .. 650 milliGRAM(s) Oral once PRN  aspirin enteric coated 81 milliGRAM(s) Oral daily  atorvastatin 80 milliGRAM(s) Oral at bedtime  bisoprolol   Tablet 10 milliGRAM(s) Oral daily  cholecalciferol 2000 Unit(s) Oral daily  lisinopril 10 milliGRAM(s) Oral daily  prasugrel 10 milliGRAM(s) Oral daily  ranolazine 1000 milliGRAM(s) Oral two times a day  rivaroxaban 20 milliGRAM(s) Oral with dinner    PMH/PSH/FH/SH:  Vitals:  T(C): 36.8 (19 @ 09:14), Max: 37 (19 @ 04:26)  HR: 71 (19 @ 09:14) (54 - 79)  BP: 128/81 (19 @ 09:14) (128/81 - 166/87)  BP(mean): 120 (19 @ 09:35) (120 - 120)  RR: 18 (19 @ 09:14) (14 - 18)  SpO2: 98% (19 @ 09:14) (95% - 100%)  Wt(kg): --  Daily Height in cm: 177.8 (11 Sep 2019 15:45)    Daily Weight in k.7 (11 Sep 2019 15:45)  I&O's Summary    11 Sep 2019 07:01  -  12 Sep 2019 07:00  --------------------------------------------------------  IN: 540 mL / OUT: 0 mL / NET: 540 mL      Physical Exam:  Appearance: [ x] Normal [ x] NAD  Eyes: [ x] PERRL [x ] EOMI  HEENT: [x ] Normal oral mus[place] [x ]NC/AT  Cardiovascular: [x ] S1 [x ] S2 [ x] RRR [ x] No m/r/g  [x]No edema, No JVD  Respiratory: [x ] Clear to auscultation bilaterally  Gastrointestinal: [x ] Soft [x ] Non-tender [x ] Non-distended [x ] BS+  Musculoskeletal: [x ] No clubbing [x ] No joint deformity   Neurologic: [x ] Non-focal  Lymphatic: [x ] No lymphadenopathy  Psychiatry: [x ] AAOx3 [x ] Mood & affect appropriate  Skin: [x ] No rashes [x ] No ecchymoses [x ] No cyanosis        140  |  105  |  19  ----------------------------<  111<H>  3.9   |  23  |  1.08    Ca    9.1      12 Sep 2019 04:54    TPro  7.0  /  Alb  4.1  /  TBili  0.6  /  DBili  x   /  AST  17  /  ALT  24  /  AlkPhos  92  09-11      Interpretation of Telemetry: No events on tele.

## 2019-09-12 NOTE — PROGRESS NOTE ADULT - ASSESSMENT
Patient is a 63y old  Male who presents with a chief complaint of CAD (11 Sep 2019 20:49) now s/p C JUDY x 1 LM and JUDY x pLAD and POBA OM via RFA/RRA access. Pt tolerated the procedure well, site benign. Post-procedure discharge instructions discuss and questions addressed

## 2019-09-12 NOTE — PROGRESS NOTE ADULT - SUBJECTIVE AND OBJECTIVE BOX
Patient is a 63y old  Male who presents with a chief complaint of CAD (11 Sep 2019 20:49) now s/p C JUDY x 1 LM and JUDY x pLAD and POBA OM via RFA/RRA access           Allergies    contrast dye- itch/rash (Other)  penicillin (Anaphylaxis)  Shrimp (Anaphylaxis)    Intolerances        Medications:  acetaminophen   Tablet .. 650 milliGRAM(s) Oral once PRN  aspirin enteric coated 81 milliGRAM(s) Oral daily  atorvastatin 80 milliGRAM(s) Oral at bedtime  bisoprolol   Tablet 10 milliGRAM(s) Oral daily  cholecalciferol 2000 Unit(s) Oral daily  lisinopril 10 milliGRAM(s) Oral daily  prasugrel 10 milliGRAM(s) Oral daily  ranolazine 1000 milliGRAM(s) Oral two times a day  rivaroxaban 20 milliGRAM(s) Oral with dinner      Vitals:  T(C): 36.8 (19 @ 21:45), Max: 36.9 (19 @ 09:35)  HR: 69 (19 @ 21:45) (54 - 79)  BP: 144/90 (19 @ 21:45) (138/86 - 166/87)  BP(mean): 120 (19 @ 09:35) (120 - 120)  RR: 18 (19 @ 21:45) (14 - 18)  SpO2: 98% (19 @ 21:45) (95% - 100%)  Wt(kg): --  Daily Height in cm: 177.8 (11 Sep 2019 15:45)    Daily Weight in k.7 (11 Sep 2019 15:45)  I&O's Summary    11 Sep 2019 07:01  -  12 Sep 2019 04:20  --------------------------------------------------------  IN: 360 mL / OUT: 0 mL / NET: 360 mL          Physical Exam:  Appearance: Normal  Eyes: PERRL, EOMI  Procedural Access Site: RRA/RFA access site No hematoma, Non-tender to palpation, 2+ pulse, No bruit, No Ecchymosis  Respiratory: Clear to auscultation bilaterally  Gastrointestinal: Soft, Non tender, Normal Bowel Sounds  Musculoskeletal: No clubbing, No joint deformity   Neurologic: Non-focal  Lymphatic: No lymphadenopathy  Psychiatry: AAOx3, Mood & affect appropriate  Skin: No rashes, No ecchymoses, No cyanosis        142  |  106  |  18  ----------------------------<  103<H>  4.5   |  23  |  1.16    Ca    9.7      11 Sep 2019 09:47    TPro  7.0  /  Alb  4.1  /  TBili  0.6  /  DBili  x   /  AST  17  /  ALT  24  /  AlkPhos  92              Lipid panel   Hgb A1c         ECG:    Echo:    Stress Testing:     Cath:    Imaging:    Interpretation of Telemetry:

## 2019-09-17 PROBLEM — I48.91 UNSPECIFIED ATRIAL FIBRILLATION: Chronic | Status: ACTIVE | Noted: 2019-09-11

## 2019-09-19 ENCOUNTER — APPOINTMENT (OUTPATIENT)
Dept: CARDIOLOGY | Facility: CLINIC | Age: 64
End: 2019-09-19
Payer: COMMERCIAL

## 2019-09-19 ENCOUNTER — NON-APPOINTMENT (OUTPATIENT)
Age: 64
End: 2019-09-19

## 2019-09-19 VITALS
BODY MASS INDEX: 31.57 KG/M2 | DIASTOLIC BLOOD PRESSURE: 80 MMHG | OXYGEN SATURATION: 96 % | WEIGHT: 220 LBS | SYSTOLIC BLOOD PRESSURE: 156 MMHG | HEART RATE: 63 BPM

## 2019-09-19 PROCEDURE — 93000 ELECTROCARDIOGRAM COMPLETE: CPT

## 2019-09-19 PROCEDURE — 99214 OFFICE O/P EST MOD 30 MIN: CPT

## 2019-10-01 ENCOUNTER — NON-APPOINTMENT (OUTPATIENT)
Age: 64
End: 2019-10-01

## 2019-10-17 ENCOUNTER — APPOINTMENT (OUTPATIENT)
Dept: CARDIOLOGY | Facility: CLINIC | Age: 64
End: 2019-10-17
Payer: COMMERCIAL

## 2019-10-17 ENCOUNTER — NON-APPOINTMENT (OUTPATIENT)
Age: 64
End: 2019-10-17

## 2019-10-17 VITALS
BODY MASS INDEX: 31.5 KG/M2 | OXYGEN SATURATION: 98 % | WEIGHT: 220 LBS | HEART RATE: 52 BPM | SYSTOLIC BLOOD PRESSURE: 146 MMHG | HEIGHT: 70 IN | DIASTOLIC BLOOD PRESSURE: 78 MMHG

## 2019-10-17 DIAGNOSIS — Z95.5 PRESENCE OF CORONARY ANGIOPLASTY IMPLANT AND GRAFT: ICD-10-CM

## 2019-10-17 DIAGNOSIS — I25.82 CHRONIC TOTAL OCCLUSION OF CORONARY ARTERY: ICD-10-CM

## 2019-10-17 PROCEDURE — 99214 OFFICE O/P EST MOD 30 MIN: CPT

## 2019-10-30 ENCOUNTER — RX RENEWAL (OUTPATIENT)
Age: 64
End: 2019-10-30

## 2019-10-31 ENCOUNTER — APPOINTMENT (OUTPATIENT)
Dept: PULMONOLOGY | Facility: CLINIC | Age: 64
End: 2019-10-31
Payer: COMMERCIAL

## 2019-10-31 VITALS
TEMPERATURE: 98.6 F | HEART RATE: 69 BPM | SYSTOLIC BLOOD PRESSURE: 132 MMHG | OXYGEN SATURATION: 95 % | DIASTOLIC BLOOD PRESSURE: 86 MMHG

## 2019-10-31 PROCEDURE — 99213 OFFICE O/P EST LOW 20 MIN: CPT

## 2019-10-31 NOTE — ASSESSMENT
[FreeTextEntry1] : Adjusted CPAP to fixed pressure of 9 with EPR 2\par Patient comfortable with this setting. \par \par Follow up for data download and compliance assessment.\par \par \par If unable to tolerate CPAP would repeat sleep study to see if there's been any change in degree of sleep apnea with cardiac intervention

## 2019-10-31 NOTE — PHYSICAL EXAM
[General Appearance - Well Developed] : well developed [Normal Appearance] : normal appearance [Well Groomed] : well groomed [General Appearance - Well Nourished] : well nourished [No Deformities] : no deformities [General Appearance - In No Acute Distress] : no acute distress [Normal Conjunctiva] : the conjunctiva exhibited no abnormalities [Eyelids - No Xanthelasma] : the eyelids demonstrated no xanthelasmas [Low Lying Soft Palate] : low lying soft palate [Elongated Uvula] : elongated uvula [Enlarged Base of the Tongue] : enlargement of the base of the tongue [Neck Appearance] : the appearance of the neck was normal [Neck Cervical Mass (___cm)] : no neck mass was observed [Jugular Venous Distention Increased] : there was no jugular-venous distention [Thyroid Diffuse Enlargement] : the thyroid was not enlarged [Thyroid Nodule] : there were no palpable thyroid nodules [Heart Rate And Rhythm] : heart rate and rhythm were normal [Heart Sounds] : normal S1 and S2 [Murmurs] : no murmurs present [Respiration, Rhythm And Depth] : normal respiratory rhythm and effort [Exaggerated Use Of Accessory Muscles For Inspiration] : no accessory muscle use [Auscultation Breath Sounds / Voice Sounds] : lungs were clear to auscultation bilaterally [Abdomen Soft] : soft [Abdomen Tenderness] : non-tender [Abdomen Mass (___ Cm)] : no abdominal mass palpated [Abnormal Walk] : normal gait [Gait - Sufficient For Exercise Testing] : the gait was sufficient for exercise testing [Nail Clubbing] : no clubbing of the fingernails [Cyanosis, Localized] : no localized cyanosis [Petechial Hemorrhages (___cm)] : no petechial hemorrhages [] : no ischemic changes [Deep Tendon Reflexes (DTR)] : deep tendon reflexes were 2+ and symmetric [Sensation] : the sensory exam was normal to light touch and pinprick [No Focal Deficits] : no focal deficits [Oriented To Time, Place, And Person] : oriented to person, place, and time [Impaired Insight] : insight and judgment were intact [Affect] : the affect was normal [Normal Oropharynx] : abnormal oropharynx

## 2019-10-31 NOTE — HISTORY OF PRESENT ILLNESS
[FreeTextEntry1] : using cpap\par wakes up around 3 am, takes cpap off\par \par now feels machine not providing enough pressure\par s/p coronary intervention, \par reportedly cardiac function improved\par

## 2019-11-12 ENCOUNTER — APPOINTMENT (OUTPATIENT)
Dept: CARDIOLOGY | Facility: CLINIC | Age: 64
End: 2019-11-12

## 2019-11-13 ENCOUNTER — INPATIENT (INPATIENT)
Facility: HOSPITAL | Age: 64
LOS: 0 days | Discharge: HOME | DRG: 251 | End: 2019-11-14
Attending: STUDENT IN AN ORGANIZED HEALTH CARE EDUCATION/TRAINING PROGRAM | Admitting: INTERNAL MEDICINE
Payer: COMMERCIAL

## 2019-11-13 VITALS
WEIGHT: 225.09 LBS | DIASTOLIC BLOOD PRESSURE: 73 MMHG | TEMPERATURE: 99 F | SYSTOLIC BLOOD PRESSURE: 160 MMHG | HEIGHT: 70 IN | OXYGEN SATURATION: 94 % | RESPIRATION RATE: 18 BRPM | HEART RATE: 67 BPM

## 2019-11-13 DIAGNOSIS — I25.10 ATHEROSCLEROTIC HEART DISEASE OF NATIVE CORONARY ARTERY WITHOUT ANGINA PECTORIS: ICD-10-CM

## 2019-11-13 DIAGNOSIS — Z95.5 PRESENCE OF CORONARY ANGIOPLASTY IMPLANT AND GRAFT: Chronic | ICD-10-CM

## 2019-11-13 DIAGNOSIS — C43.9 MALIGNANT MELANOMA OF SKIN, UNSPECIFIED: Chronic | ICD-10-CM

## 2019-11-13 LAB
ALBUMIN SERPL ELPH-MCNC: 4 G/DL — SIGNIFICANT CHANGE UP (ref 3.3–5)
ALBUMIN SERPL ELPH-MCNC: 4.5 G/DL — SIGNIFICANT CHANGE UP (ref 3.3–5)
ALP SERPL-CCNC: 106 U/L — SIGNIFICANT CHANGE UP (ref 40–120)
ALP SERPL-CCNC: 95 U/L — SIGNIFICANT CHANGE UP (ref 40–120)
ALT FLD-CCNC: 28 U/L — SIGNIFICANT CHANGE UP (ref 10–45)
ALT FLD-CCNC: 33 U/L — SIGNIFICANT CHANGE UP (ref 10–45)
ANION GAP SERPL CALC-SCNC: 14 MMOL/L — SIGNIFICANT CHANGE UP (ref 5–17)
ANION GAP SERPL CALC-SCNC: 14 MMOL/L — SIGNIFICANT CHANGE UP (ref 5–17)
APTT BLD: 63.1 SEC — HIGH (ref 27.5–36.3)
AST SERPL-CCNC: 17 U/L — SIGNIFICANT CHANGE UP (ref 10–40)
AST SERPL-CCNC: 22 U/L — SIGNIFICANT CHANGE UP (ref 10–40)
BASOPHILS # BLD AUTO: 0.01 K/UL — SIGNIFICANT CHANGE UP (ref 0–0.2)
BASOPHILS NFR BLD AUTO: 0.1 % — SIGNIFICANT CHANGE UP (ref 0–2)
BILIRUB SERPL-MCNC: 0.8 MG/DL — SIGNIFICANT CHANGE UP (ref 0.2–1.2)
BILIRUB SERPL-MCNC: 0.9 MG/DL — SIGNIFICANT CHANGE UP (ref 0.2–1.2)
BUN SERPL-MCNC: 17 MG/DL — SIGNIFICANT CHANGE UP (ref 7–23)
BUN SERPL-MCNC: 20 MG/DL — SIGNIFICANT CHANGE UP (ref 7–23)
CALCIUM SERPL-MCNC: 9.1 MG/DL — SIGNIFICANT CHANGE UP (ref 8.4–10.5)
CALCIUM SERPL-MCNC: 9.8 MG/DL — SIGNIFICANT CHANGE UP (ref 8.4–10.5)
CHLORIDE SERPL-SCNC: 104 MMOL/L — SIGNIFICANT CHANGE UP (ref 96–108)
CHLORIDE SERPL-SCNC: 105 MMOL/L — SIGNIFICANT CHANGE UP (ref 96–108)
CK MB CFR SERPL CALC: 3.5 NG/ML — SIGNIFICANT CHANGE UP (ref 0–6.7)
CK SERPL-CCNC: 91 U/L — SIGNIFICANT CHANGE UP (ref 30–200)
CO2 SERPL-SCNC: 20 MMOL/L — LOW (ref 22–31)
CO2 SERPL-SCNC: 23 MMOL/L — SIGNIFICANT CHANGE UP (ref 22–31)
CREAT SERPL-MCNC: 0.82 MG/DL — SIGNIFICANT CHANGE UP (ref 0.5–1.3)
CREAT SERPL-MCNC: 0.86 MG/DL — SIGNIFICANT CHANGE UP (ref 0.5–1.3)
EOSINOPHIL # BLD AUTO: 0 K/UL — SIGNIFICANT CHANGE UP (ref 0–0.5)
EOSINOPHIL NFR BLD AUTO: 0 % — SIGNIFICANT CHANGE UP (ref 0–6)
GLUCOSE SERPL-MCNC: 138 MG/DL — HIGH (ref 70–99)
GLUCOSE SERPL-MCNC: 172 MG/DL — HIGH (ref 70–99)
HCT VFR BLD CALC: 44.7 % — SIGNIFICANT CHANGE UP (ref 39–50)
HCT VFR BLD CALC: 45.1 % — SIGNIFICANT CHANGE UP (ref 39–50)
HGB BLD-MCNC: 14.8 G/DL — SIGNIFICANT CHANGE UP (ref 13–17)
HGB BLD-MCNC: 15.6 G/DL — SIGNIFICANT CHANGE UP (ref 13–17)
IMM GRANULOCYTES NFR BLD AUTO: 0.5 % — SIGNIFICANT CHANGE UP (ref 0–1.5)
INR BLD: 1.14 RATIO — SIGNIFICANT CHANGE UP (ref 0.88–1.16)
LYMPHOCYTES # BLD AUTO: 0.63 K/UL — LOW (ref 1–3.3)
LYMPHOCYTES # BLD AUTO: 5.5 % — LOW (ref 13–44)
MAGNESIUM SERPL-MCNC: 1.8 MG/DL — SIGNIFICANT CHANGE UP (ref 1.6–2.6)
MCHC RBC-ENTMCNC: 29.1 PG — SIGNIFICANT CHANGE UP (ref 27–34)
MCHC RBC-ENTMCNC: 29.8 PG — SIGNIFICANT CHANGE UP (ref 27–34)
MCHC RBC-ENTMCNC: 33.1 GM/DL — SIGNIFICANT CHANGE UP (ref 32–36)
MCHC RBC-ENTMCNC: 34.6 GM/DL — SIGNIFICANT CHANGE UP (ref 32–36)
MCV RBC AUTO: 86.1 FL — SIGNIFICANT CHANGE UP (ref 80–100)
MCV RBC AUTO: 88 FL — SIGNIFICANT CHANGE UP (ref 80–100)
MONOCYTES # BLD AUTO: 0.16 K/UL — SIGNIFICANT CHANGE UP (ref 0–0.9)
MONOCYTES NFR BLD AUTO: 1.4 % — LOW (ref 2–14)
NEUTROPHILS # BLD AUTO: 10.51 K/UL — HIGH (ref 1.8–7.4)
NEUTROPHILS NFR BLD AUTO: 92.5 % — HIGH (ref 43–77)
NRBC # BLD: 0 /100 WBCS — SIGNIFICANT CHANGE UP (ref 0–0)
NRBC # BLD: 0 /100 WBCS — SIGNIFICANT CHANGE UP (ref 0–0)
PHOSPHATE SERPL-MCNC: 3.3 MG/DL — SIGNIFICANT CHANGE UP (ref 2.5–4.5)
PLATELET # BLD AUTO: 207 K/UL — SIGNIFICANT CHANGE UP (ref 150–400)
PLATELET # BLD AUTO: 208 K/UL — SIGNIFICANT CHANGE UP (ref 150–400)
POTASSIUM SERPL-MCNC: 3.9 MMOL/L — SIGNIFICANT CHANGE UP (ref 3.5–5.3)
POTASSIUM SERPL-MCNC: 4.4 MMOL/L — SIGNIFICANT CHANGE UP (ref 3.5–5.3)
POTASSIUM SERPL-SCNC: 3.9 MMOL/L — SIGNIFICANT CHANGE UP (ref 3.5–5.3)
POTASSIUM SERPL-SCNC: 4.4 MMOL/L — SIGNIFICANT CHANGE UP (ref 3.5–5.3)
PROT SERPL-MCNC: 6.9 G/DL — SIGNIFICANT CHANGE UP (ref 6–8.3)
PROT SERPL-MCNC: 7.2 G/DL — SIGNIFICANT CHANGE UP (ref 6–8.3)
PROTHROM AB SERPL-ACNC: 13 SEC — HIGH (ref 10–12.9)
RBC # BLD: 5.08 M/UL — SIGNIFICANT CHANGE UP (ref 4.2–5.8)
RBC # BLD: 5.24 M/UL — SIGNIFICANT CHANGE UP (ref 4.2–5.8)
RBC # FLD: 12.8 % — SIGNIFICANT CHANGE UP (ref 10.3–14.5)
RBC # FLD: 13.1 % — SIGNIFICANT CHANGE UP (ref 10.3–14.5)
SODIUM SERPL-SCNC: 139 MMOL/L — SIGNIFICANT CHANGE UP (ref 135–145)
SODIUM SERPL-SCNC: 141 MMOL/L — SIGNIFICANT CHANGE UP (ref 135–145)
TROPONIN T, HIGH SENSITIVITY RESULT: 32 NG/L — SIGNIFICANT CHANGE UP (ref 0–51)
WBC # BLD: 11.31 K/UL — HIGH (ref 3.8–10.5)
WBC # BLD: 11.37 K/UL — HIGH (ref 3.8–10.5)
WBC # FLD AUTO: 11.31 K/UL — HIGH (ref 3.8–10.5)
WBC # FLD AUTO: 11.37 K/UL — HIGH (ref 3.8–10.5)

## 2019-11-13 PROCEDURE — 99221 1ST HOSP IP/OBS SF/LOW 40: CPT | Mod: 25

## 2019-11-13 PROCEDURE — 92924 PRQ TRLUML C ATHRC 1 ART&/BR: CPT | Mod: RC

## 2019-11-13 PROCEDURE — 92974 CATH PLACE CARDIO BRACHYTX: CPT

## 2019-11-13 PROCEDURE — 93010 ELECTROCARDIOGRAM REPORT: CPT | Mod: 76

## 2019-11-13 PROCEDURE — 99223 1ST HOSP IP/OBS HIGH 75: CPT | Mod: GC

## 2019-11-13 PROCEDURE — 77318 BRACHYTX ISODOSE COMPLEX: CPT | Mod: 26

## 2019-11-13 PROCEDURE — 99152 MOD SED SAME PHYS/QHP 5/>YRS: CPT | Mod: 59

## 2019-11-13 PROCEDURE — 92978 ENDOLUMINL IVUS OCT C 1ST: CPT | Mod: 26,RC

## 2019-11-13 PROCEDURE — 77770 HDR RDNCL NTRSTL/ICAV BRCHTX: CPT | Mod: 26

## 2019-11-13 PROCEDURE — 77263 THER RADIOLOGY TX PLNG CPLX: CPT

## 2019-11-13 PROCEDURE — 77290 THER RAD SIMULAJ FIELD CPLX: CPT | Mod: 26

## 2019-11-13 PROCEDURE — 77470 SPECIAL RADIATION TREATMENT: CPT | Mod: 26

## 2019-11-13 RX ORDER — ASPIRIN/CALCIUM CARB/MAGNESIUM 324 MG
81 TABLET ORAL DAILY
Refills: 0 | Status: DISCONTINUED | OUTPATIENT
Start: 2019-11-13 | End: 2019-11-14

## 2019-11-13 RX ORDER — ACETAMINOPHEN 500 MG
650 TABLET ORAL EVERY 6 HOURS
Refills: 0 | Status: DISCONTINUED | OUTPATIENT
Start: 2019-11-13 | End: 2019-11-14

## 2019-11-13 RX ORDER — NITROGLYCERIN 6.5 MG
45 CAPSULE, EXTENDED RELEASE ORAL
Qty: 50 | Refills: 0 | Status: DISCONTINUED | OUTPATIENT
Start: 2019-11-13 | End: 2019-11-14

## 2019-11-13 RX ORDER — ATORVASTATIN CALCIUM 80 MG/1
80 TABLET, FILM COATED ORAL AT BEDTIME
Refills: 0 | Status: DISCONTINUED | OUTPATIENT
Start: 2019-11-13 | End: 2019-11-14

## 2019-11-13 RX ORDER — HEPARIN SODIUM 5000 [USP'U]/ML
5000 INJECTION INTRAVENOUS; SUBCUTANEOUS EVERY 8 HOURS
Refills: 0 | Status: DISCONTINUED | OUTPATIENT
Start: 2019-11-13 | End: 2019-11-14

## 2019-11-13 RX ORDER — RANOLAZINE 500 MG/1
1 TABLET, FILM COATED, EXTENDED RELEASE ORAL
Qty: 0 | Refills: 0 | DISCHARGE

## 2019-11-13 RX ORDER — CHLORHEXIDINE GLUCONATE 213 G/1000ML
1 SOLUTION TOPICAL
Refills: 0 | Status: DISCONTINUED | OUTPATIENT
Start: 2019-11-13 | End: 2019-11-14

## 2019-11-13 RX ORDER — LISINOPRIL 2.5 MG/1
10 TABLET ORAL DAILY
Refills: 0 | Status: DISCONTINUED | OUTPATIENT
Start: 2019-11-13 | End: 2019-11-14

## 2019-11-13 RX ORDER — BISOPROLOL FUMARATE 10 MG/1
10 TABLET, FILM COATED ORAL DAILY
Refills: 0 | Status: DISCONTINUED | OUTPATIENT
Start: 2019-11-13 | End: 2019-11-14

## 2019-11-13 RX ORDER — PRASUGREL 5 MG/1
10 TABLET, FILM COATED ORAL DAILY
Refills: 0 | Status: DISCONTINUED | OUTPATIENT
Start: 2019-11-13 | End: 2019-11-14

## 2019-11-13 RX ADMIN — ATORVASTATIN CALCIUM 80 MILLIGRAM(S): 80 TABLET, FILM COATED ORAL at 22:43

## 2019-11-13 RX ADMIN — HEPARIN SODIUM 5000 UNIT(S): 5000 INJECTION INTRAVENOUS; SUBCUTANEOUS at 22:43

## 2019-11-13 NOTE — CHART NOTE - NSCHARTNOTEFT_GEN_A_CORE
Removal of Radial Band    Pulses in the right upper extremity are palpable. The patient was placed in the supine position. The insertion site was identified and the band deflated per protocol. The radial band was removed slowly. Direct pressure was applied for  ___0___ minutes/not applicable.      Monitoring of the right wrists and both upper extremities including neuro-vascular checks and vital signs every 15 minutes x 4.    Complications: None    Comments: Distal pulses are palpable and no cyanosis or hematoma noted

## 2019-11-13 NOTE — PROGRESS NOTE ADULT - SUBJECTIVE AND OBJECTIVE BOX
====================  CCU MIDNIGHT ROUNDS  ====================    JOVANI VIVEROS  [id number]  Patient is a 64y old  Male who presents with a chief complaint of Angina (13 Nov 2019 19:10)      ====================  SUMMARY:   63 y.o M with CAD s/p MI in 2008 and multiple stents, chronic stable angina on medical therapy, AFIB (Xarelto-last dose 11/10/19 ), HTN, and HLD s/p laser, POBA, and brachytherapy to previously stented mRCA for chronic stable angina.   ====================      ====================  NEW EVENTS: Right radial band removed, weaned off nitro gtt and started on PO meds.   ====================    MEDICATIONS  (STANDING):  aspirin enteric coated 81 milliGRAM(s) Oral daily  atorvastatin 80 milliGRAM(s) Oral at bedtime  bisoprolol   Tablet 10 milliGRAM(s) Oral daily  chlorhexidine 4% Liquid 1 Application(s) Topical <User Schedule>  heparin  Injectable 5000 Unit(s) SubCutaneous every 8 hours  lisinopril 10 milliGRAM(s) Oral daily  nitroglycerin  Infusion 45 MICROgram(s)/Min (13.5 mL/Hr) IV Continuous <Continuous>  prasugrel 10 milliGRAM(s) Oral daily    MEDICATIONS  (PRN):  acetaminophen   Tablet .. 650 milliGRAM(s) Oral every 6 hours PRN Mild Pain (1 - 3), Moderate Pain (4 - 6)      ====================  VITALS (Last 12 hrs):  ====================    T(C): 36.5 (11-13-19 @ 18:13), Max: 37 (11-13-19 @ 11:01)  T(F): 97.7 (11-13-19 @ 18:13), Max: 98.6 (11-13-19 @ 11:01)  HR: 68 (11-13-19 @ 21:45) (60 - 82)  BP: 134/63 (11-13-19 @ 21:45) (121/74 - 160/73)  BP(mean): 80 (11-13-19 @ 21:45) (80 - 113)  RR: 15 (11-13-19 @ 21:45) (15 - 34)  SpO2: 95% (11-13-19 @ 21:45) (94% - 97%)      I&O's Summary    13 Nov 2019 07:01  -  13 Nov 2019 22:20  --------------------------------------------------------  IN: 139 mL / OUT: 0 mL / NET: 139 mL    ====================  NEW LABS:  ====================      11-13    139  |  105  |  17  ----------------------------<  172<H>  3.9   |  20<L>  |  0.82    Ca    9.1      13 Nov 2019 18:54  Phos  3.3     11-13  Mg     1.8     11-13    TPro  6.9  /  Alb  4.0  /  TBili  0.9  /  DBili  x   /  AST  17  /  ALT  28  /  AlkPhos  95  11-13    PT/INR - ( 13 Nov 2019 18:54 )   PT: 13.0 sec;   INR: 1.14 ratio         PTT - ( 13 Nov 2019 18:54 )  PTT:63.1 sec  Creatine Kinase, Serum: 91 U/L (11-13-19 @ 18:54)    CKMB Units: 3.5 ng/mL (11-13 @ 18:54)        ====================  PLAN:  ====================  #Cardiac  unstable angina  -weaned off nitro gtt, monitor tolerance  -restarted home meds, lisinopril and bisoprolol in the AM  -c/w DAPT and statin       Jacklyn Fairbanks CCU NP  Beeper #[id number]  Spectra # [id number]

## 2019-11-13 NOTE — PROGRESS NOTE ADULT - ASSESSMENT
Assessment:   63 y.o M with CAD s/p MI in 2008 and multiple stents, chronic stable angina on medical therapy, AFIB (Xarelto-last dose 11/10/19 ), HTN, and HLD s/p laser, brachytherapy, and   Plan:     #Cardiovascular  -Pump      -Valves    -Vessels    #Neuro    #Pulm    #GI    #Renal/    #ID    #MSK    #Endocrine    #PPX    #GOC    Francisco Kumar, PGY1 Assessment:   63 y.o M with CAD s/p MI in 2008 and multiple stents, chronic stable angina on medical therapy, AFIB (Xarelto-last dose 11/10/19 ), HTN, and HLD s/p laser, POBA, and brachytherapy to previously stented mRCA for chronic stable angina.    Plan:     #Cardiovascular  Unstable angina requiring nitroglycerin  -Nitro drip, titrate to chest pain.  -Give home lisinopril 10 mg now, early b/c took all dose s in AM  -Bisoprolol 10 mg daily starting tomorrow    CAD with stents to RCA and LAD  -Atorvastatin 80 mg, replacing home crsetor 45  -ASA 81 mg daily  -Prasugrel    Afib  -C/w home xarelto    #Neuro  No active issues    #Pulm  No active issues    #GI  No active issues    #Renal/  No active issues    #ID  No active issues    #MSK  No active issues    #Endocrine  No active issues    #PPX  No active issues    #GOC  Full code    Francisco Kumar, PGY1

## 2019-11-13 NOTE — PROGRESS NOTE ADULT - SUBJECTIVE AND OBJECTIVE BOX
CCU Accept Note    Transfer from: (  ) Medicine    (  ) Telemetry    (  ) RCU                                        (  ) Palliative     (  ) Stroke Unit    ( x ) Cath    [facility]   63 y.o M with CAD s/p MI in 2008 and multiple stents, chronic stable angina on medical therapy, AFIB (Xarelto-last dose 11/10/19 ), HTN, HLD, PAMELA-uses CPAP referred to the [facility] clinic for evaluation because of angina despite medical therapy and the presence of a LCX . Pt presents today for laser PTA to the mRCA ISR given the complexity.    He has     REVIEW OF SYSTEMS:     MEDICATIONS  (STANDING):  aspirin enteric coated 81 milliGRAM(s) Oral daily  atorvastatin 80 milliGRAM(s) Oral at bedtime  chlorhexidine 4% Liquid 1 Application(s) Topical <User Schedule>  heparin  Injectable 5000 Unit(s) SubCutaneous every 8 hours  lisinopril 10 milliGRAM(s) Oral daily  prasugrel 10 milliGRAM(s) Oral daily    MEDICATIONS  (PRN):  acetaminophen   Tablet .. 650 milliGRAM(s) Oral every 6 hours PRN Mild Pain (1 - 3), Moderate Pain (4 - 6)      Allergies    contrast dye- itch/rash (Other)  penicillin (Anaphylaxis)  Shrimp (Anaphylaxis)    Intolerances        Vital Signs Last 24 Hrs  T(C): 36.5 (13 Nov 2019 18:13), Max: 37 (13 Nov 2019 11:01)  T(F): 97.7 (13 Nov 2019 18:13), Max: 98.6 (13 Nov 2019 11:01)  HR: 62 (13 Nov 2019 19:00) (62 - 80)  BP: 140/69 (13 Nov 2019 18:45) (134/78 - 160/73)  BP(mean): 94 (13 Nov 2019 18:45) (94 - 113)  RR: 16 (13 Nov 2019 19:00) (16 - 22)  SpO2: 96% (13 Nov 2019 19:00) (94% - 97%)    I&O's Summary    13 Nov 2019 07:01  -  13 Nov 2019 19:10  --------------------------------------------------------  IN: 100 mL / OUT: 0 mL / NET: 100 mL        Physical Exam:  General: Well-appearing, NAD  HEENT: PERRL, EOMI, normal sclera and conjunctiva, normal oropharynx  Neck: Supple, no JVD, thyroid without masses or enlargement  Chest/Lungs: CTA bilaterally, no wheezing, rales, rhonchi or rub  Heart: RRR, normal S1, S2, no murmurs or gallops  Abdomen: Soft, ND, NTTP, normoactive bowel sounds  Extremities: 2+ peripheral pulses b/l, no edema, clubbing or cyanosis  Skin: Warm, well-perfused, no rashes or lesions  Neurological: A&Ox3, moves all extremities, no focal deficits    LABS:                         14.8   11.37 )-----------( 207      ( 13 Nov 2019 18:54 )             44.7     11-13    141  |  104  |  20  ----------------------------<  138<H>  4.4   |  23  |  0.86    Ca    9.8      13 Nov 2019 11:18    TPro  7.2  /  Alb  4.5  /  TBili  0.8  /  DBili  x   /  AST  22  /  ALT  33  /  AlkPhos  106  11-13    LIVER FUNCTIONS - ( 13 Nov 2019 11:18 )  Alb: 4.5 g/dL / Pro: 7.2 g/dL / ALK PHOS: 106 U/L / ALT: 33 U/L / AST: 22 U/L / GGT: x                     ECG:    Telemetry (24 Hrs):    Echocardiogram:    IMAGING:    ASSESSMENT & PLAN: CCU Accept Note    Transfer from: (  ) Medicine    (  ) Telemetry    (  ) RCU                                        (  ) Palliative     (  ) Stroke Unit    ( x ) Cath    [facility]:   63 y.o M with CAD s/p MI in 2008 and multiple stents, chronic stable angina on medical therapy, AFIB (Xarelto-last dose 11/10/19 ), HTN, HLD, PAMELA-uses CPAP referred to the [facility] clinic for evaluation because of angina despite medical therapy and the presence of a LCX . Pt presents today for laser PTA to the mRCA ISR given the complexity.    Leading up to the procedure    During the procedure, he    REVIEW OF SYSTEMS:     MEDICATIONS  (STANDING):  aspirin enteric coated 81 milliGRAM(s) Oral daily  atorvastatin 80 milliGRAM(s) Oral at bedtime  chlorhexidine 4% Liquid 1 Application(s) Topical <User Schedule>  heparin  Injectable 5000 Unit(s) SubCutaneous every 8 hours  lisinopril 10 milliGRAM(s) Oral daily  prasugrel 10 milliGRAM(s) Oral daily    MEDICATIONS  (PRN):  acetaminophen   Tablet .. 650 milliGRAM(s) Oral every 6 hours PRN Mild Pain (1 - 3), Moderate Pain (4 - 6)      Allergies    contrast dye- itch/rash (Other)  penicillin (Anaphylaxis)  Shrimp (Anaphylaxis)    Intolerances        Vital Signs Last 24 Hrs  T(C): 36.5 (13 Nov 2019 18:13), Max: 37 (13 Nov 2019 11:01)  T(F): 97.7 (13 Nov 2019 18:13), Max: 98.6 (13 Nov 2019 11:01)  HR: 62 (13 Nov 2019 19:00) (62 - 80)  BP: 140/69 (13 Nov 2019 18:45) (134/78 - 160/73)  BP(mean): 94 (13 Nov 2019 18:45) (94 - 113)  RR: 16 (13 Nov 2019 19:00) (16 - 22)  SpO2: 96% (13 Nov 2019 19:00) (94% - 97%)    I&O's Summary    13 Nov 2019 07:01  -  13 Nov 2019 19:10  --------------------------------------------------------  IN: 100 mL / OUT: 0 mL / NET: 100 mL        Physical Exam:  General: Well-appearing, NAD  HEENT: PERRL, EOMI, normal sclera and conjunctiva, normal oropharynx  Neck: Supple, no JVD, thyroid without masses or enlargement  Chest/Lungs: CTA bilaterally, no wheezing, rales, rhonchi or rub  Heart: RRR, normal S1, S2, no murmurs or gallops  Abdomen: Soft, ND, NTTP, normoactive bowel sounds  Extremities: 2+ peripheral pulses b/l, no edema, clubbing or cyanosis  Skin: Warm, well-perfused, no rashes or lesions  Neurological: A&Ox3, moves all extremities, no focal deficits    LABS:                         14.8   11.37 )-----------( 207      ( 13 Nov 2019 18:54 )             44.7     11-13    141  |  104  |  20  ----------------------------<  138<H>  4.4   |  23  |  0.86    Ca    9.8      13 Nov 2019 11:18    TPro  7.2  /  Alb  4.5  /  TBili  0.8  /  DBili  x   /  AST  22  /  ALT  33  /  AlkPhos  106  11-13    LIVER FUNCTIONS - ( 13 Nov 2019 11:18 )  Alb: 4.5 g/dL / Pro: 7.2 g/dL / ALK PHOS: 106 U/L / ALT: 33 U/L / AST: 22 U/L / GGT: x                     ECG:    Telemetry (24 Hrs):    Echocardiogram:    IMAGING:    ASSESSMENT & PLAN: CCU Accept Note    Transfer from: (  ) Medicine    (  ) Telemetry    (  ) RCU                                        (  ) Palliative     (  ) Stroke Unit    ( x ) Cath    [facility]   63 y.o M with CAD s/p MI in 2008 and multiple stents, chronic stable angina on medical therapy, AFIB (Xarelto-last dose 11/10/19 ), HTN, HLD, PAMELA-uses CPAP referred to the [facility] clinic for evaluation because of angina despite medical therapy and the presence of a LCX . Pt presents today for laser PTA to the mRCA ISR given the complexity.    Leading up to the procedure, he was free of anginal symptoms. He was not taking any medications leading up to this. He could climb a flight of stairs without symptoms. No orthopnea. No dyspnea. This is much improved since his last stent to open up his  of LAD in 9/2019.    Today, during the procedure he began feeling warm and diaphoretic with 9/10 constant dull chest pain. It did not improve with fentanyl, but did improve slightly with morphine. He was started on a nitro drip and this brought the pain to a 4/10. He fell asleep after the procedure and woke up pain free.    Presently, in the CCU, he is asymptomatic except for a headache he attributes to the nitro drip.    REVIEW OF SYSTEMS:   General: No fevers  Cardiac: As in HPI. No PINKY or orthopnea.  Pulm: No SOB, no cough  GI: No abdominal pain, normal BM  : No dysuria  Skin: No rashes    MEDICATIONS  (STANDING):  aspirin enteric coated 81 milliGRAM(s) Oral daily  atorvastatin 80 milliGRAM(s) Oral at bedtime  chlorhexidine 4% Liquid 1 Application(s) Topical <User Schedule>  heparin  Injectable 5000 Unit(s) SubCutaneous every 8 hours  lisinopril 10 milliGRAM(s) Oral daily  prasugrel 10 milliGRAM(s) Oral daily    MEDICATIONS  (PRN):  acetaminophen   Tablet .. 650 milliGRAM(s) Oral every 6 hours PRN Mild Pain (1 - 3), Moderate Pain (4 - 6)    Allergies    contrast dye- itch/rash (Other)  penicillin (Anaphylaxis)  Shrimp (Anaphylaxis)    Intolerances        Vital Signs Last 24 Hrs  T(C): 36.5 (13 Nov 2019 18:13), Max: 37 (13 Nov 2019 11:01)  T(F): 97.7 (13 Nov 2019 18:13), Max: 98.6 (13 Nov 2019 11:01)  HR: 62 (13 Nov 2019 19:00) (62 - 80)  BP: 140/69 (13 Nov 2019 18:45) (134/78 - 160/73)  BP(mean): 94 (13 Nov 2019 18:45) (94 - 113)  RR: 16 (13 Nov 2019 19:00) (16 - 22)  SpO2: 96% (13 Nov 2019 19:00) (94% - 97%)    I&O's Summary    13 Nov 2019 07:01  -  13 Nov 2019 19:10  --------------------------------------------------------  IN: 100 mL / OUT: 0 mL / NET: 100 mL        Physical Exam:  General: Well-appearing. Lying in bed in no acute distress. Appears tired, but well.  HEENT: PERRL, EOMI, normal sclera and conjunctiva, normal oropharynx  Neck: Supple, no JVD, thyroid without masses or enlargement  Chest/Lungs: CTA bilaterally, no wheezing, rales, rhonchi or rub  Heart: RRR, normal S1, S2, no murmurs or gallops  Abdomen: Soft, ND, NTTP, normoactive bowel sounds  Extremities: 2+ peripheral pulses b/l, no edema, clubbing or cyanosis  Skin: Warm, well-perfused, no rashes or lesions  Neurological: A&Ox3, moves all extremities, no focal deficits    LABS:                         14.8   11.37 )-----------( 207      ( 13 Nov 2019 18:54 )             44.7     11-13    141  |  104  |  20  ----------------------------<  138<H>  4.4   |  23  |  0.86    Ca    9.8      13 Nov 2019 11:18    TPro  7.2  /  Alb  4.5  /  TBili  0.8  /  DBili  x   /  AST  22  /  ALT  33  /  AlkPhos  106  11-13    LIVER FUNCTIONS - ( 13 Nov 2019 11:18 )  Alb: 4.5 g/dL / Pro: 7.2 g/dL / ALK PHOS: 106 U/L / ALT: 33 U/L / AST: 22 U/L / GGT: x                     ECG:    Telemetry (24 Hrs):    Echocardiogram:    IMAGING:    ASSESSMENT & PLAN: CCU Accept Note    Transfer from: (  ) Medicine    (  ) Telemetry    (  ) RCU                                        (  ) Palliative     (  ) Stroke Unit    ( x ) Cath    [facility]   63 y.o M with CAD s/p MI in 2008 and multiple stents, chronic stable angina on medical therapy, AFIB (Xarelto-last dose 11/10/19 ), HTN, HLD, PAMELA-uses CPAP referred to the [facility] clinic for evaluation because of angina despite medical therapy and the presence of a LCX . Pt presents today for laser PTA to the mRCA ISR given the complexity.    Leading up to the procedure, he was free of anginal symptoms. He was not taking any medications leading up to this. He could climb a flight of stairs without symptoms. No orthopnea. No dyspnea. This is much improved since his last stent to open up his  of LAD in 9/2019.    Today, during the procedure he began feeling warm and diaphoretic with 9/10 constant dull chest pain. It did not improve with fentanyl, but did improve slightly with morphine. He was started on a nitro drip and this brought the pain to a 4/10. He fell asleep after the procedure and woke up pain free.    Presently, in the CCU, he is asymptomatic except for a headache he attributes to the nitro drip. He has no shortness of breath.    REVIEW OF SYSTEMS:   General: No fevers  Cardiac: As in HPI. No PINKY or orthopnea.  Pulm: No SOB, no cough  GI: No abdominal pain, normal BM  : No dysuria  Skin: No rashes    MEDICATIONS  (STANDING):  aspirin enteric coated 81 milliGRAM(s) Oral daily  atorvastatin 80 milliGRAM(s) Oral at bedtime  chlorhexidine 4% Liquid 1 Application(s) Topical <User Schedule>  heparin  Injectable 5000 Unit(s) SubCutaneous every 8 hours  lisinopril 10 milliGRAM(s) Oral daily  prasugrel 10 milliGRAM(s) Oral daily    MEDICATIONS  (PRN):  acetaminophen   Tablet .. 650 milliGRAM(s) Oral every 6 hours PRN Mild Pain (1 - 3), Moderate Pain (4 - 6)    Allergies    contrast dye- itch/rash (Other)  penicillin (Anaphylaxis)  Shrimp (Anaphylaxis)    Intolerances        Vital Signs Last 24 Hrs  T(C): 36.5 (13 Nov 2019 18:13), Max: 37 (13 Nov 2019 11:01)  T(F): 97.7 (13 Nov 2019 18:13), Max: 98.6 (13 Nov 2019 11:01)  HR: 62 (13 Nov 2019 19:00) (62 - 80)  BP: 140/69 (13 Nov 2019 18:45) (134/78 - 160/73)  BP(mean): 94 (13 Nov 2019 18:45) (94 - 113)  RR: 16 (13 Nov 2019 19:00) (16 - 22)  SpO2: 96% (13 Nov 2019 19:00) (94% - 97%)    I&O's Summary    13 Nov 2019 07:01  -  13 Nov 2019 19:10  --------------------------------------------------------  IN: 100 mL / OUT: 0 mL / NET: 100 mL        Physical Exam:  General: Well-appearing. Lying in bed in no acute distress. Appears tired, but well.  Neck: Supple, no JVD.  Chest/Lungs: CTA bilaterally, no wheezing, rales, rhonchi or rub  Heart: RRR, normal S1, S2, no murmurs or gallops. No S3.  Abdomen: Soft, ND, NTTP, normoactive bowel sounds  Extremities: 2+ peripheral pulses b/l, no edema, clubbing or cyanosis  Skin: Warm, well-perfused, no rashes or lesions  Neurological: Alert and appropriately conversant, CN II-XII intact. MAEx4.     LABS:                         14.8   11.37 )-----------( 207      ( 13 Nov 2019 18:54 )             44.7     11-13    141  |  104  |  20  ----------------------------<  138<H>  4.4   |  23  |  0.86    Ca    9.8      13 Nov 2019 11:18    TPro  7.2  /  Alb  4.5  /  TBili  0.8  /  DBili  x   /  AST  22  /  ALT  33  /  AlkPhos  106  11-13    LIVER FUNCTIONS - ( 13 Nov 2019 11:18 )  Alb: 4.5 g/dL / Pro: 7.2 g/dL / ALK PHOS: 106 U/L / ALT: 33 U/L / AST: 22 U/L / GGT: x                     ECG: NSR. Unchanged biphasic AVL and TRENT in AVF.    Telemetry (24 Hrs): NSR. Occasional PVCS    Echocardiogram:  < from: TTE with Doppler (w/Cont) (04.23.19 @ 08:31) >  Conclusions:  1. The inferolateral wall land the infeior wall are  hypokietic.  Endocardial visualization enhanced with  intravenous injection of Ultrasonic Enhancing Agent  (Definity).  2. Normal right ventricular size and function.  *** No previous Echo exam.    < end of copied text >      < from: Cardiac Cath Lab - Adult (09.11.19 @ 12:43) >  CORONARY VESSELS: The coronary circulation is right dominant.  LM:   --  LM: There was a 80 % stenosis.  LAD:   --  Proximal LAD: There was a 70 % stenosis.  CX:   --  Proximal circumflex: There was a diffuse 100 % stenosis. There  was ZOEY grade 0 flow through the vessel (no flow) and a moderate-sized  vascular territory distal to the lesion. This is a likely culprit for the  patient's clinical presentation. An intervention was performed.  RCA:   --  Mid RCA: There was a diffuse 99 % stenosis at the site of a  prior angioplasty with stent placement. There was ZOEY grade 2 flow  through the vessel (partial perfusion). This is a likely culprit for the  patient's clinical presentation. An intervention was performed.  COMPLICATIONS: There were no complications.  DIAGNOSTIC RECOMMENDATIONS:  1. Successful PCI to the mRCA (POBA 4.0mm cutting balloon) followed by   PCI to the pLCx (POBA 2.5mm balloon) and PCI to the LM into the LAD (5.0mm  and 4.0mm Wallace JUDY) in the setting of refractory angina.  2. DAPT.  3. [facility].  INTERVENTIONAL RECOMMENDATIONS:  1. Successful PCI to the mRCA (POBA 4.0mm cutting balloon) followed by   PCI to the pLCx (POBA 2.5mm balloon) and PCI to the LM into the LAD (5.0mm  and 4.0mm Wallace JUDY) in the setting of refractory angina.  2. DAPT.  3. Will bring patient back for laser PTA to the mRCA ISRgiven the  complexity.    < end of copied text >    Pending formal cath report from 11/13:  -per flowsheet; Laser on mRCA, POBA, and brachytherapy

## 2019-11-13 NOTE — H&P CARDIOLOGY - HISTORY OF PRESENT ILLNESS
63 y.o M with CAD s/p MI in 2008 and multiple stents, chronic stable angina on medical therapy, AFIB (Xarelto), HTN, HLD, PAMELA referred to the [facility]/ clinic for evaluation because of angina despite medical therapy and the presence of a LCX . Pt [facility] today [facility] given the complexity.      On 9/11/2019 he underwent a PCI to the mRCA (POBA 4.0mm cutting balloon) followed by , PCI to the pLCx (POBA 2.5mm balloon) and PCI to the LM into the LAD (5.0mm and 4.0mm Sim JUDY).   He reports that his angina has now resolved and he feels that he is back to normal.     Cardiac Cath 9/11/2019   CONTRAST GIVEN: Omnipaque 230 ml.  MEDICATIONS GIVEN: Fentanyl, 25 mcg, IV. Midazolam, 1 mg, IV. Fentanyl, 50 mcg, IV. Midazolam, 1 mg, IV. Fentanyl, 50 mcg, IV. Aspirin, 325 mg, PO. Heparin, 07735 units, IV. Heparin, 3000 units, IV. Heparin, 3000 units, IV. Cardene, 500 mcg.  VENTRICLES: No left ventriculogram was performed.  CORONARY VESSELS: The coronary circulation is right dominant.  LM: -- LM: There was a 80 % stenosis.  LAD: -- Proximal LAD: There was a 70 % stenosis.  CX: -- Proximal circumflex: There was a diffuse 100 % stenosis. There  was ZOEY grade 0 flow through the vessel (no flow) and a moderate-sized  vascular territory distal to the lesion. This is a likely culprit for the  patient's clinical presentation. An intervention was performed.  RCA: -- Mid RCA: There was a diffuse 99 % stenosis at the site of a  prior angioplasty with stent placement. There was ZOEY grade 2 flow  through the vessel (partial perfusion). This is a likely culprit for the  patient's clinical presentation. An intervention was performed.  COMPLICATIONS: There were no complications.  INTERVENTIONAL RECOMMENDATIONS:  1. Successful PCI to the mRCA (POBA 4.0mm cutting balloon) followed by  PCI to the pLCx (POBA 2.5mm balloon) and PCI to the LM into the LAD (5.0mm and 4.0mm Sim JUDY) in the setting of refractory angina.  2. DAPT.  3. Will bring patient back for laser PTA to the [facility] ISR given the complexity.    Cardiac cath 4/23/2019   INDICATIONS: Subsequent NSTEMI (small Troponin leak in the setting of AFIB with RVR)  VENTRICLES: Analysis of regional contractile function demonstrated mild inferolateral hypokinesis (consistent with the LCX ). EF estimated was 50 %.  CORONARY VESSELS: The coronary circulation is right dominant.  LM: -- LM: Angiography showed moderate atherosclerosis.  LAD: -- Proximal LAD: Angiography showed moderate atherosclerosis.  -- Mid LAD: There was a 20 % stenosis at the site of a prior stent.  -- D1: Angiography showed moderate atherosclerosis. There was a tubular 20 % stenosis at the site of a prior stent.  CX: -- Proximal circumflex: There was a 100 % stenosis. There was good collateral blood supply to the distal myocardium. This lesion is a likely culprit for the patient's recent myocardial infarction.  RCA: -- Proximal RCA: There was a 0 % stenosis at the site of a prior stent. -- RPDA: There was a 0 % stenosis at the site of a prior stent.    Patient is allergic to IV contrast not premedicated, d/w Dr. Esteves and gave IV prednisone as advised. 63 y.o M with CAD s/p MI in 2008 and multiple stents, chronic stable angina on medical therapy, AFIB (Xarelto-last dose ), HTN, HLD, PAMELA referred to the [facility]/ clinic for evaluation because of angina despite medical therapy and the presence of a LCX . Pt presents today for laser PTA to the mRCA ISR given the complexity.  On 9/11/2019 he underwent a PCI to the mRCA (POBA 4.0mm cutting balloon) followed by , PCI to the pLCx (POBA 2.5mm balloon) and PCI to the LM into the LAD (5.0mm and 4.0mm Sim JUDY). He reports that his angina has now resolved and he feels that he is back to normal.     Cardiac Cath 9/11/2019   CONTRAST GIVEN: Omnipaque 230 ml.  MEDICATIONS GIVEN: Fentanyl, 25 mcg, IV. Midazolam, 1 mg, IV. Fentanyl, 50 mcg, IV. Midazolam, 1 mg, IV. Fentanyl, 50 mcg, IV. Aspirin, 325 mg, PO. Heparin, 64284 units, IV. Heparin, 3000 units, IV. Heparin, 3000 units, IV. Cardene, 500 mcg.  VENTRICLES: No left ventriculogram was performed.  CORONARY VESSELS: The coronary circulation is right dominant.  LM: -- LM: There was a 80 % stenosis.  LAD: -- Proximal LAD: There was a 70 % stenosis.  CX: -- Proximal circumflex: There was a diffuse 100 % stenosis. There  was ZOEY grade 0 flow through the vessel (no flow) and a moderate-sized  vascular territory distal to the lesion. This is a likely culprit for the  patient's clinical presentation. An intervention was performed.  RCA: -- Mid RCA: There was a diffuse 99 % stenosis at the site of a prior angioplasty with stent placement. There was ZOEY grade 2 flow through the vessel (partial perfusion). This is a likely culprit for the patient's clinical presentation. An intervention was performed.  COMPLICATIONS: There were no complications.  INTERVENTIONAL RECOMMENDATIONS:  1. Successful PCI to the mRCA (POBA 4.0mm cutting balloon) followed by  PCI to the pLCx (POBA 2.5mm balloon) and PCI to the LM into the LAD (5.0mm and 4.0mm Supply JUDY) in the setting of refractory angina.  2. DAPT.  3. Will bring patient back for laser PTA to the [facility] ISR given the complexity.    Cardiac cath 4/23/2019   INDICATIONS: Subsequent NSTEMI (small Troponin leak in the setting of AFIB with RVR)  VENTRICLES: Analysis of regional contractile function demonstrated mild inferolateral hypokinesis (consistent with the LCX ). EF estimated was 50 %.  CORONARY VESSELS: The coronary circulation is right dominant.  LM: -- LM: Angiography showed moderate atherosclerosis.  LAD: -- Proximal LAD: Angiography showed moderate atherosclerosis.  -- Mid LAD: There was a 20 % stenosis at the site of a prior stent.  -- D1: Angiography showed moderate atherosclerosis. There was a tubular 20 % stenosis at the site of a prior stent.  CX: -- Proximal circumflex: There was a 100 % stenosis. There was good collateral blood supply to the distal myocardium. This lesion is a likely culprit for the patient's recent myocardial infarction.  RCA: -- Proximal RCA: There was a 0 % stenosis at the site of a prior stent. -- RPDA: There was a 0 % stenosis at the site of a prior stent.    Patient is allergic to IV contrast-premedicated with Prednisone.     Cards-Dr Colón 63 y.o M with CAD s/p MI in 2008 and multiple stents, chronic stable angina on medical therapy, AFIB (Xarelto-last dose 11/10/19 ), HTN, HLD, PAMELA-uses CPAP referred to the [facility]/ clinic for evaluation because of angina despite medical therapy and the presence of a LCX . Pt presents today for laser PTA to the mRCA ISR given the complexity.  On 9/11/2019 he underwent a PCI to the mRCA (POBA 4.0mm cutting balloon) followed by , PCI to the pLCx (POBA 2.5mm balloon) and PCI to the LM into the LAD (5.0mm and 4.0mm Sim JUDY). He reports that his angina has now resolved and he feels that he is back to normal.     Cardiac Cath 9/11/2019   CONTRAST GIVEN: Omnipaque 230 ml.  MEDICATIONS GIVEN: Fentanyl, 25 mcg, IV. Midazolam, 1 mg, IV. Fentanyl, 50 mcg, IV. Midazolam, 1 mg, IV. Fentanyl, 50 mcg, IV. Aspirin, 325 mg, PO. Heparin, 79223 units, IV. Heparin, 3000 units, IV. Heparin, 3000 units, IV. Cardene, 500 mcg.  VENTRICLES: No left ventriculogram was performed.  CORONARY VESSELS: The coronary circulation is right dominant.  LM: -- LM: There was a 80 % stenosis.  LAD: -- Proximal LAD: There was a 70 % stenosis.  CX: -- Proximal circumflex: There was a diffuse 100 % stenosis. There  was ZOEY grade 0 flow through the vessel (no flow) and a moderate-sized  vascular territory distal to the lesion. This is a likely culprit for the  patient's clinical presentation. An intervention was performed.  RCA: -- Mid RCA: There was a diffuse 99 % stenosis at the site of a prior angioplasty with stent placement. There was ZOEY grade 2 flow through the vessel (partial perfusion). This is a likely culprit for the patient's clinical presentation. An intervention was performed.  COMPLICATIONS: There were no complications.  INTERVENTIONAL RECOMMENDATIONS:  1. Successful PCI to the mRCA (POBA 4.0mm cutting balloon) followed by  PCI to the pLCx (POBA 2.5mm balloon) and PCI to the LM into the LAD (5.0mm and 4.0mm Sim JUDY) in the setting of refractory angina.  2. DAPT.  3. Will bring patient back for laser PTA to the [facility] ISR given the complexity.    Cardiac cath 4/23/2019   INDICATIONS: Subsequent NSTEMI (small Troponin leak in the setting of AFIB with RVR)  VENTRICLES: Analysis of regional contractile function demonstrated mild inferolateral hypokinesis (consistent with the LCX ). EF estimated was 50 %.  CORONARY VESSELS: The coronary circulation is right dominant.  LM: -- LM: Angiography showed moderate atherosclerosis.  LAD: -- Proximal LAD: Angiography showed moderate atherosclerosis.  -- Mid LAD: There was a 20 % stenosis at the site of a prior stent.  -- D1: Angiography showed moderate atherosclerosis. There was a tubular 20 % stenosis at the site of a prior stent.  CX: -- Proximal circumflex: There was a 100 % stenosis. There was good collateral blood supply to the distal myocardium. This lesion is a likely culprit for the patient's recent myocardial infarction.  RCA: -- Proximal RCA: There was a 0 % stenosis at the site of a prior stent. -- RPDA: There was a 0 % stenosis at the site of a prior stent.    Patient is allergic to IV contrast-premedicated with Prednisone.     Cards-Dr Colón

## 2019-11-14 ENCOUNTER — TRANSCRIPTION ENCOUNTER (OUTPATIENT)
Age: 64
End: 2019-11-14

## 2019-11-14 ENCOUNTER — INBOUND DOCUMENT (OUTPATIENT)
Age: 64
End: 2019-11-14

## 2019-11-14 VITALS
HEART RATE: 62 BPM | DIASTOLIC BLOOD PRESSURE: 80 MMHG | RESPIRATION RATE: 18 BRPM | OXYGEN SATURATION: 97 % | TEMPERATURE: 98 F | SYSTOLIC BLOOD PRESSURE: 145 MMHG

## 2019-11-14 LAB
ALBUMIN SERPL ELPH-MCNC: 3.6 G/DL — SIGNIFICANT CHANGE UP (ref 3.3–5)
ALP SERPL-CCNC: 86 U/L — SIGNIFICANT CHANGE UP (ref 40–120)
ALT FLD-CCNC: 26 U/L — SIGNIFICANT CHANGE UP (ref 10–45)
ANION GAP SERPL CALC-SCNC: 9 MMOL/L — SIGNIFICANT CHANGE UP (ref 5–17)
AST SERPL-CCNC: 28 U/L — SIGNIFICANT CHANGE UP (ref 10–40)
BASOPHILS # BLD AUTO: 0.02 K/UL — SIGNIFICANT CHANGE UP (ref 0–0.2)
BASOPHILS NFR BLD AUTO: 0.2 % — SIGNIFICANT CHANGE UP (ref 0–2)
BILIRUB SERPL-MCNC: 0.5 MG/DL — SIGNIFICANT CHANGE UP (ref 0.2–1.2)
BUN SERPL-MCNC: 18 MG/DL — SIGNIFICANT CHANGE UP (ref 7–23)
CALCIUM SERPL-MCNC: 9.1 MG/DL — SIGNIFICANT CHANGE UP (ref 8.4–10.5)
CHLORIDE SERPL-SCNC: 106 MMOL/L — SIGNIFICANT CHANGE UP (ref 96–108)
CO2 SERPL-SCNC: 23 MMOL/L — SIGNIFICANT CHANGE UP (ref 22–31)
CREAT SERPL-MCNC: 0.88 MG/DL — SIGNIFICANT CHANGE UP (ref 0.5–1.3)
EOSINOPHIL # BLD AUTO: 0.01 K/UL — SIGNIFICANT CHANGE UP (ref 0–0.5)
EOSINOPHIL NFR BLD AUTO: 0.1 % — SIGNIFICANT CHANGE UP (ref 0–6)
GLUCOSE SERPL-MCNC: 120 MG/DL — HIGH (ref 70–99)
HCT VFR BLD CALC: 41.5 % — SIGNIFICANT CHANGE UP (ref 39–50)
HGB BLD-MCNC: 14 G/DL — SIGNIFICANT CHANGE UP (ref 13–17)
IMM GRANULOCYTES NFR BLD AUTO: 0.8 % — SIGNIFICANT CHANGE UP (ref 0–1.5)
LYMPHOCYTES # BLD AUTO: 1.6 K/UL — SIGNIFICANT CHANGE UP (ref 1–3.3)
LYMPHOCYTES # BLD AUTO: 12.1 % — LOW (ref 13–44)
MAGNESIUM SERPL-MCNC: 2.1 MG/DL — SIGNIFICANT CHANGE UP (ref 1.6–2.6)
MCHC RBC-ENTMCNC: 29.7 PG — SIGNIFICANT CHANGE UP (ref 27–34)
MCHC RBC-ENTMCNC: 33.7 GM/DL — SIGNIFICANT CHANGE UP (ref 32–36)
MCV RBC AUTO: 87.9 FL — SIGNIFICANT CHANGE UP (ref 80–100)
MONOCYTES # BLD AUTO: 0.89 K/UL — SIGNIFICANT CHANGE UP (ref 0–0.9)
MONOCYTES NFR BLD AUTO: 6.7 % — SIGNIFICANT CHANGE UP (ref 2–14)
NEUTROPHILS # BLD AUTO: 10.64 K/UL — HIGH (ref 1.8–7.4)
NEUTROPHILS NFR BLD AUTO: 80.1 % — HIGH (ref 43–77)
NRBC # BLD: 0 /100 WBCS — SIGNIFICANT CHANGE UP (ref 0–0)
PHOSPHATE SERPL-MCNC: 3.4 MG/DL — SIGNIFICANT CHANGE UP (ref 2.5–4.5)
PLATELET # BLD AUTO: 168 K/UL — SIGNIFICANT CHANGE UP (ref 150–400)
POTASSIUM SERPL-MCNC: 4.1 MMOL/L — SIGNIFICANT CHANGE UP (ref 3.5–5.3)
POTASSIUM SERPL-SCNC: 4.1 MMOL/L — SIGNIFICANT CHANGE UP (ref 3.5–5.3)
PROT SERPL-MCNC: 6.4 G/DL — SIGNIFICANT CHANGE UP (ref 6–8.3)
RBC # BLD: 4.72 M/UL — SIGNIFICANT CHANGE UP (ref 4.2–5.8)
RBC # FLD: 13.2 % — SIGNIFICANT CHANGE UP (ref 10.3–14.5)
SODIUM SERPL-SCNC: 138 MMOL/L — SIGNIFICANT CHANGE UP (ref 135–145)
WBC # BLD: 13.26 K/UL — HIGH (ref 3.8–10.5)
WBC # FLD AUTO: 13.26 K/UL — HIGH (ref 3.8–10.5)

## 2019-11-14 PROCEDURE — C1717: CPT

## 2019-11-14 PROCEDURE — 82553 CREATINE MB FRACTION: CPT

## 2019-11-14 PROCEDURE — 99238 HOSP IP/OBS DSCHRG MGMT 30/<: CPT | Mod: GC

## 2019-11-14 PROCEDURE — 77470 SPECIAL RADIATION TREATMENT: CPT

## 2019-11-14 PROCEDURE — 99152 MOD SED SAME PHYS/QHP 5/>YRS: CPT

## 2019-11-14 PROCEDURE — C1887: CPT

## 2019-11-14 PROCEDURE — 77770 HDR RDNCL NTRSTL/ICAV BRCHTX: CPT

## 2019-11-14 PROCEDURE — 77318 BRACHYTX ISODOSE COMPLEX: CPT

## 2019-11-14 PROCEDURE — 84100 ASSAY OF PHOSPHORUS: CPT

## 2019-11-14 PROCEDURE — C1885: CPT

## 2019-11-14 PROCEDURE — C1769: CPT

## 2019-11-14 PROCEDURE — 99153 MOD SED SAME PHYS/QHP EA: CPT

## 2019-11-14 PROCEDURE — 92974 CATH PLACE CARDIO BRACHYTX: CPT

## 2019-11-14 PROCEDURE — 83735 ASSAY OF MAGNESIUM: CPT

## 2019-11-14 PROCEDURE — C1894: CPT

## 2019-11-14 PROCEDURE — 77290 THER RAD SIMULAJ FIELD CPLX: CPT

## 2019-11-14 PROCEDURE — 93005 ELECTROCARDIOGRAM TRACING: CPT

## 2019-11-14 PROCEDURE — 92978 ENDOLUMINL IVUS OCT C 1ST: CPT | Mod: RC

## 2019-11-14 PROCEDURE — 92924 PRQ TRLUML C ATHRC 1 ART&/BR: CPT | Mod: RC

## 2019-11-14 PROCEDURE — 85730 THROMBOPLASTIN TIME PARTIAL: CPT

## 2019-11-14 PROCEDURE — 84484 ASSAY OF TROPONIN QUANT: CPT

## 2019-11-14 PROCEDURE — 82550 ASSAY OF CK (CPK): CPT

## 2019-11-14 PROCEDURE — C1728: CPT

## 2019-11-14 PROCEDURE — 85027 COMPLETE CBC AUTOMATED: CPT

## 2019-11-14 PROCEDURE — 85610 PROTHROMBIN TIME: CPT

## 2019-11-14 PROCEDURE — 77370 RADIATION PHYSICS CONSULT: CPT

## 2019-11-14 PROCEDURE — C1725: CPT

## 2019-11-14 PROCEDURE — 80053 COMPREHEN METABOLIC PANEL: CPT

## 2019-11-14 RX ORDER — RIVAROXABAN 15 MG-20MG
20 KIT ORAL
Refills: 0 | Status: DISCONTINUED | OUTPATIENT
Start: 2019-11-14 | End: 2019-11-14

## 2019-11-14 RX ADMIN — Medication 81 MILLIGRAM(S): at 06:13

## 2019-11-14 RX ADMIN — PRASUGREL 10 MILLIGRAM(S): 5 TABLET, FILM COATED ORAL at 06:14

## 2019-11-14 RX ADMIN — HEPARIN SODIUM 5000 UNIT(S): 5000 INJECTION INTRAVENOUS; SUBCUTANEOUS at 06:13

## 2019-11-14 RX ADMIN — CHLORHEXIDINE GLUCONATE 1 APPLICATION(S): 213 SOLUTION TOPICAL at 06:14

## 2019-11-14 RX ADMIN — BISOPROLOL FUMARATE 10 MILLIGRAM(S): 10 TABLET, FILM COATED ORAL at 06:14

## 2019-11-14 RX ADMIN — RIVAROXABAN 20 MILLIGRAM(S): KIT at 06:22

## 2019-11-14 RX ADMIN — LISINOPRIL 10 MILLIGRAM(S): 2.5 TABLET ORAL at 06:21

## 2019-11-14 NOTE — DISCHARGE NOTE NURSING/CASE MANAGEMENT/SOCIAL WORK - PATIENT PORTAL LINK FT
You can access the FollowMyHealth Patient Portal offered by [facility] by registering at the following website: [email or web address]. By joining [employer]’s FollowMyHealth portal, you will also be able to view your health information using other applications (apps) compatible with our system.

## 2019-11-14 NOTE — DISCHARGE NOTE PROVIDER - NSDCFUADDAPPT_GEN_ALL_CORE_FT
Please follow up with your primary care doctor, Dr. Colón. Call their office to make an appointment within the next week.    Please follow up with Dr. Esteves within the next week. Please call their office to make an appointment.

## 2019-11-14 NOTE — PROGRESS NOTE ADULT - ASSESSMENT
Assessment:   63 y.o M with CAD s/p MI in 2008 and multiple stents, chronic stable angina on medical therapy, AFIB (Xarelto-last dose 11/10/19 ), HTN, and HLD s/p laser, POBA, and brachytherapy to previously stented mRCA for chronic stable angina.    Plan:     #Cardiovascular  Unstable angina requiring nitroglycerin  -Nitro drip, titrate to chest pain.  -Give home lisinopril 10 mg now, early b/c took all dose s in AM  -Bisoprolol 10 mg daily starting tomorrow    CAD with stents to RCA and LAD  -Atorvastatin 80 mg, replacing home crsetor 45  -ASA 81 mg daily  -Prasugrel    Afib  -C/w home xarelto    #Neuro  No active issues    #Pulm  No active issues    #GI  No active issues    #Renal/  No active issues    #ID  No active issues    #MSK  No active issues    #Endocrine  No active issues    #PPX  No active issues    #GOC  Full code    Francisco Kumar, PGY1 Assessment:   63 y.o M with CAD s/p MI in 2008 and multiple stents, chronic stable angina on medical therapy, AFIB (Xarelto-last dose 11/10/19 ), HTN, and HLD s/p laser, POBA, and brachytherapy to previously stented mRCA for chronic stable angina.    Plan:     #Cardiovascular  Unstable angina requiring nitroglycerin  -Home Lisinopril 10 mg  -Home Bisoprolol 10 mg    CAD with stents to RCA and LAD  -Atorvastatin 80 mg, replacing home crsetor 45  -D/c ASA 81 mg daily  -C/w Prasugrel    Afib  -C/w home xarelto    #Neuro  No active issues    #Pulm  No active issues    #GI  No active issues    #Renal/  No active issues    #ID  No active issues    #MSK  No active issues    #Endocrine  No active issues    #PPX  No active issues    #GOC  Full code    Dispo:  Home today. No ASA. C/w prasugrel and xarelto.    Francisco Kumar, PGY1

## 2019-11-14 NOTE — DISCHARGE NOTE PROVIDER - NSDCMRMEDTOKEN_GEN_ALL_CORE_FT
bisoprolol 10 mg oral tablet: 1 tab(s) orally once a day  cholecalciferol oral tablet: 2000 unit(s) orally once a day  Co Q-10 100 mg oral capsule: 1 cap(s) orally once a day  Crestor 40 mg oral tablet: 1 tab(s) orally once a day  Effient 10 mg oral tablet: 1 tab(s) orally once a day  lisinopril 10 mg oral tablet: 1 tab(s) orally once a day  Vitamin D3 2000 intl units oral capsule: 1 cap(s) orally once a day  Xarelto 20 mg oral tablet: 1 tab(s) orally once a day ( last dose on 9/8)

## 2019-11-14 NOTE — DISCHARGE NOTE PROVIDER - CARE PROVIDERS DIRECT ADDRESSES
[email or web address]

## 2019-11-14 NOTE — DISCHARGE NOTE PROVIDER - CARE PROVIDER_API CALL
Melani Esteves)  Cardiology; Internal Medicine; Interventional Cardiology  [facility] Dept of Cardiology, [address] [place] [address]  Phone: [phone]  Fax: [phone]  Follow Up Time:     Anish Colón)  Cardiovascular Disease; Internal Medicine  [address] [facility], [facility] [address]  [place] [address]  Phone: [phone]  Fax: [phone]  Follow Up Time:

## 2019-11-14 NOTE — DISCHARGE NOTE PROVIDER - HOSPITAL COURSE
63 y.o M with CAD s/p MI in 2008 and multiple stents, chronic stable angina on medical therapy, AFIB (Xarelto-last dose 11/10/19 ), HTN, HLD, PAMELA-uses CPAP referred to the [facility]/ clinic for evaluation because of angina despite medical therapy and the presence of a LCX . Pt presents today for laser PTA to the mRCA ISR given the complexity.        Leading up to the procedure, he was free of anginal symptoms. He was not taking any medications leading up to this. He could climb a flight of stairs without symptoms. No orthopnea. No dyspnea. This is much improved since his last stent to open up his  of LAD in 9/2019.        Today, during the procedure he began feeling warm and diaphoretic with 9/10 constant dull chest pain. It did not improve with fentanyl, but did improve slightly with morphine. He was started on a nitro drip and this brought the pain to a 4/10. He fell asleep after the procedure and woke up pain free.        In the CCU, he is asymptomatic except for a headache he attributes to the nitro drip. He has no shortness of breath. He was weaned off the nitro after receiving his home lisinopril 10 mg once. In the morning, his pressures were well controlled. He was chest pain free without shortness of breath. He felt better than when he came into the hospital. He was seen and judged to be medically stable for discharge.

## 2019-11-14 NOTE — DISCHARGE NOTE NURSING/CASE MANAGEMENT/SOCIAL WORK - NSDCPEXARELTO_GEN_ALL_CORE
Rivaroxaban/Xarelto - Potential for adverse drug reactions and interactions/Rivaroxaban/Xarelto - Dietary Advice/Rivaroxaban/Xarelto - Compliance/Rivaroxaban/Xarelto - Follow up monitoring

## 2019-11-14 NOTE — PROGRESS NOTE ADULT - SUBJECTIVE AND OBJECTIVE BOX
PATIENT:  JOVANI VIVEROS  [id number]    CHIEF COMPLAINT:  Patient is a 64y old  Male who presents with a chief complaint of Chest pain (2019 22:19)      INTERVAL HISTORY/OVERNIGHT EVENTS:      REVIEW OF SYSTEMS:    Constitutional:     [ ] negative [ ] fevers [ ] chills [ ] weight loss [ ] weight gain  HEENT:                  [ ] negative [ ] dry eyes [ ] eye irritation [ ] postnasal drip [ ] nasal congestion  CV:                         [ ] negative  [ ] chest pain [ ] orthopnea [ ] palpitations [ ] murmur  Resp:                     [ ] negative [ ] cough [ ] shortness of breath [ ] dyspnea [ ] wheezing [ ] sputum [ ] hemoptysis  GI:                          [ ] negative [ ] nausea [ ] vomiting [ ] diarrhea [ ] constipation [ ] abd pain [ ] dysphagia   :                        [ ] negative [ ] dysuria [ ] nocturia [ ] hematuria [ ] increased urinary frequency  Musculoskeletal: [ ] negative [ ] back pain [ ] myalgias [ ] arthralgias [ ] fracture  Skin:                       [ ] negative [ ] rash [ ] itch  Neurological:        [ ] negative [ ] headache [ ] dizziness [ ] syncope [ ] weakness [ ] numbness  Psychiatric:           [ ] negative [ ] anxiety [ ] depression  Endocrine:            [ ] negative [ ] diabetes [ ] thyroid problem  Heme/Lymph:      [ ] negative [ ] anemia [ ] bleeding problem  Allergic/Immune: [ ] negative [ ] itchy eyes [ ] nasal discharge [ ] hives [ ] angioedema    [ ] All other systems negative  [ ] Unable to assess ROS because ________.    MEDICATIONS:  MEDICATIONS  (STANDING):  aspirin enteric coated 81 milliGRAM(s) Oral daily  atorvastatin 80 milliGRAM(s) Oral at bedtime  bisoprolol   Tablet 10 milliGRAM(s) Oral daily  chlorhexidine 4% Liquid 1 Application(s) Topical <User Schedule>  heparin  Injectable 5000 Unit(s) SubCutaneous every 8 hours  lisinopril 10 milliGRAM(s) Oral daily  nitroglycerin  Infusion 45 MICROgram(s)/Min (13.5 mL/Hr) IV Continuous <Continuous>  prasugrel 10 milliGRAM(s) Oral daily  rivaroxaban 20 milliGRAM(s) Oral with dinner    MEDICATIONS  (PRN):  acetaminophen   Tablet .. 650 milliGRAM(s) Oral every 6 hours PRN Mild Pain (1 - 3), Moderate Pain (4 - 6)      ALLERGIES:  Allergies    contrast dye- itch/rash (Other)  penicillin (Anaphylaxis)  Shrimp (Anaphylaxis)    Intolerances        OBJECTIVE:  ICU Vital Signs Last 24 Hrs  T(C): 36.4 (2019 07:30), Max: 37 (2019 11:01)  T(F): 97.6 (2019 07:30), Max: 98.6 (2019 11:01)  HR: 64 (2019 07:30) (58 - 82)  BP: 144/71 (2019 07:30) (112/69 - 161/85)  BP(mean): 84 (2019 07:30) (79 - 114)  ABP: --  ABP(mean): --  RR: 12 (2019 07:30) (12 - 34)  SpO2: 98% (2019 07:30) (92% - 98%)      Adult Advanced Hemodynamics Last 24 Hrs  CVP(mm Hg): --  CVP(cm H2O): --  CO: --  CI: --  PA: --  PA(mean): --  PCWP: --  SVR: --  SVRI: --  PVR: --  PVRI: --  CAPILLARY BLOOD GLUCOSE        CAPILLARY BLOOD GLUCOSE        I&O's Summary    2019 07:01  -  2019 07:00  --------------------------------------------------------  IN: 259 mL / OUT: 0 mL / NET: 259 mL      Daily Height in cm: 177.8 (2019 18:13)    Daily Weight in k.7 (2019 18:13)    PHYSICAL EXAMINATION:  General: WN/WD NAD  HEENT: PERRLA, EOMI, moist mucous membranes  Neurology: A&Ox3, nonfocal, PEREZ x 4  Respiratory: CTA B/L, normal respiratory effort, no wheezes, crackles, rales  CV: RRR, S1S2, no murmurs, rubs or gallops  Abdominal: Soft, NT, ND +BS, Last BM  Extremities: No edema, + peripheral pulses  Incisions:   Tubes:    LABS:                          14.0   13.26 )-----------( 168      ( 2019 06:39 )             41.5         138  |  106  |  18  ----------------------------<  120<H>  4.1   |  23  |  0.88    Ca    9.1      2019 06:39  Phos  3.4       Mg     2.1         TPro  6.4  /  Alb  3.6  /  TBili  0.5  /  DBili  x   /  AST  28  /  ALT  26  /  AlkPhos  86      LIVER FUNCTIONS - ( 2019 06:39 )  Alb: 3.6 g/dL / Pro: 6.4 g/dL / ALK PHOS: 86 U/L / ALT: 26 U/L / AST: 28 U/L / GGT: x           PT/INR - ( 2019 18:54 )   PT: 13.0 sec;   INR: 1.14 ratio         PTT - ( 2019 18:54 )  PTT:63.1 sec  Creatine Kinase, Serum: 91 U/L ( @ 18:54)  CKMB Units: 3.5 ng/mL ( @ 18:54)    CARDIAC MARKERS ( 2019 18:54 )  x     / x     / 91 U/L / x     / 3.5 ng/mL          TELEMETRY:     EKG:     IMAGING: PATIENT:  JOVANI VIVEROS  [id number]    CHIEF COMPLAINT:  Patient is a 64y old  Male who presents with a chief complaint of Chest pain (2019 22:19)      INTERVAL HISTORY/OVERNIGHT EVENTS:  Nitro drip d/c'ed w/o CP    SUBJ:  No acute complaints. No headache or chest pain or shortness of breath.  He actually feels better than when he came into the hospital.    MEDICATIONS:  MEDICATIONS  (STANDING):  aspirin enteric coated 81 milliGRAM(s) Oral daily  atorvastatin 80 milliGRAM(s) Oral at bedtime  bisoprolol   Tablet 10 milliGRAM(s) Oral daily  chlorhexidine 4% Liquid 1 Application(s) Topical <User Schedule>  heparin  Injectable 5000 Unit(s) SubCutaneous every 8 hours  lisinopril 10 milliGRAM(s) Oral daily  nitroglycerin  Infusion 45 MICROgram(s)/Min (13.5 mL/Hr) IV Continuous <Continuous>  prasugrel 10 milliGRAM(s) Oral daily  rivaroxaban 20 milliGRAM(s) Oral with dinner    MEDICATIONS  (PRN):  acetaminophen   Tablet .. 650 milliGRAM(s) Oral every 6 hours PRN Mild Pain (1 - 3), Moderate Pain (4 - 6)      ALLERGIES:  Allergies    contrast dye- itch/rash (Other)  penicillin (Anaphylaxis)  Shrimp (Anaphylaxis)    Intolerances    OBJECTIVE:  ICU Vital Signs Last 24 Hrs  T(C): 36.4 (2019 07:30), Max: 37 (2019 11:01)  T(F): 97.6 (2019 07:30), Max: 98.6 (2019 11:01)  HR: 64 (2019 07:30) (58 - 82)  BP: 144/71 (2019 07:30) (112/69 - 161/85)  BP(mean): 84 (2019 07:30) (79 - 114)  ABP: --  ABP(mean): --  RR: 12 (2019 07:30) (12 - 34)  SpO2: 98% (2019 07:30) (92% - 98%)      Adult Advanced Hemodynamics Last 24 Hrs  CVP(mm Hg): --  CVP(cm H2O): --  CO: --  CI: --  PA: --  PA(mean): --  PCWP: --  SVR: --  SVRI: --  PVR: --  PVRI: --  CAPILLARY BLOOD GLUCOSE        CAPILLARY BLOOD GLUCOSE        I&O's Summary    2019 07:01  -  2019 07:00  --------------------------------------------------------  IN: 259 mL / OUT: 0 mL / NET: 259 mL      Daily Height in cm: 177.8 (2019 18:13)    Daily Weight in k.7 (2019 18:13)    PHYSICAL EXAMINATION:  General: Lying in bed, NAD  Neurology: Alert and appropriately conversant, PEREZ x 4  Respiratory: CTA B/L, normal respiratory effort, no wheezes, crackles, rales  CV: RRR, S1S2, no murmurs, rubs or gallops  Abdominal: Soft, NT, ND +BS, Last BM  Extremities: No edema, + peripheral pulses  Incisions: Right radial site has no swelling or tenderness.      LABS:                          14.0   13.26 )-----------( 168      ( 2019 06:39 )             41.5     -    138  |  106  |  18  ----------------------------<  120<H>  4.1   |  23  |  0.88    Ca    9.1      2019 06:39  Phos  3.4     -  Mg     2.1         TPro  6.4  /  Alb  3.6  /  TBili  0.5  /  DBili  x   /  AST  28  /  ALT  26  /  AlkPhos  86  -    LIVER FUNCTIONS - ( 2019 06:39 )  Alb: 3.6 g/dL / Pro: 6.4 g/dL / ALK PHOS: 86 U/L / ALT: 26 U/L / AST: 28 U/L / GGT: x           PT/INR - ( 2019 18:54 )   PT: 13.0 sec;   INR: 1.14 ratio         PTT - ( 2019 18:54 )  PTT:63.1 sec  Creatine Kinase, Serum: 91 U/L ( @ 18:54)  CKMB Units: 3.5 ng/mL (11-13 @ 18:54)    CARDIAC MARKERS ( 2019 18:54 )  x     / x     / 91 U/L / x     / 3.5 ng/mL          TELEMETRY: Atrial fibrillation, nsr    EKG: NSR with LAE and bigeminy in II    IMAGING: No new imaging

## 2019-11-14 NOTE — DISCHARGE NOTE PROVIDER - NSDCCPCAREPLAN_GEN_ALL_CORE_FT
PRINCIPAL DISCHARGE DIAGNOSIS  Diagnosis: Chronic stable angina  Assessment and Plan of Treatment: You came into the hospital to improve your chronic stable angina. There was a stenosis of one of your coronary arteries, but it was not amenable to another stent. So, the interventional cardiologists used a balloon, lasers, and local radiation to the affected coronary artery to reopen the stenosis.  However, you started to have severe chest pain requiring a nitro drip. You had to be monitored in the CCU briefly, but you quickly no longer in need of the nitro drip and continued on your home medications. IMPORTANTLY: continue to take your prasugrel and xarelto, but do not take your aspirin. Continue to take all other medications as prescribed.

## 2019-11-14 NOTE — DISCHARGE NOTE PROVIDER - NSDCCPTREATMENT_GEN_ALL_CORE_FT
PRINCIPAL PROCEDURE  Procedure: Left heart catheterization  Findings and Treatment: The stenosis in your right coronary artery was opened with brachytherapy, laser, and POBA. Your course was complicated with a nitro drip which was only needed briefly.

## 2019-11-16 NOTE — PHYSICAL EXAM
[General Appearance - In No Acute Distress] : no acute distress [Normal Conjunctiva] : the conjunctiva exhibited no abnormalities [No Oral Pallor] : no oral pallor [No Oral Cyanosis] : no oral cyanosis [Heart Rate And Rhythm] : heart rate and rhythm were normal [Heart Sounds] : normal S1 and S2 [Murmurs] : no murmurs present [Arterial Pulses Normal] : the arterial pulses were normal [Edema] : no peripheral edema present [Auscultation Breath Sounds / Voice Sounds] : lungs were clear to auscultation bilaterally [Bowel Sounds] : normal bowel sounds [Abdomen Soft] : soft [Abdomen Tenderness] : non-tender [Abnormal Walk] : normal gait [Cyanosis, Localized] : no localized cyanosis [Nail Clubbing] : no clubbing of the fingernails [Petechial Hemorrhages (___cm)] : no petechial hemorrhages [] : no rash [Oriented To Time, Place, And Person] : oriented to person, place, and time [FreeTextEntry1] : Normal pulses in all 4 extremities. No access site bleeding.

## 2019-11-16 NOTE — PHYSICAL EXAM
[General Appearance - In No Acute Distress] : no acute distress [Normal Conjunctiva] : the conjunctiva exhibited no abnormalities [No Oral Pallor] : no oral pallor [No Oral Cyanosis] : no oral cyanosis [Heart Rate And Rhythm] : heart rate and rhythm were normal [Heart Sounds] : normal S1 and S2 [Murmurs] : no murmurs present [Arterial Pulses Normal] : the arterial pulses were normal [Edema] : no peripheral edema present [Auscultation Breath Sounds / Voice Sounds] : lungs were clear to auscultation bilaterally [Bowel Sounds] : normal bowel sounds [Abdomen Soft] : soft [Abdomen Tenderness] : non-tender [Abnormal Walk] : normal gait [Nail Clubbing] : no clubbing of the fingernails [Petechial Hemorrhages (___cm)] : no petechial hemorrhages [Cyanosis, Localized] : no localized cyanosis [] : no rash [Oriented To Time, Place, And Person] : oriented to person, place, and time [FreeTextEntry1] : No JVD

## 2019-11-16 NOTE — DISCUSSION/SUMMARY
[FreeTextEntry1] : 63 y.o M with CAD s/p MI in 2008 and multiple stents, chronic stable angina on medical therapy, AFIB (Xarelto), HTN, HLD, PAMELA referred to the [facility]/ clinic for evaluation because of  angina despite medical therapy\par \par -  CAD / Stable angina despite medical therapy: s/p  PCI to the mRCA (POBA 4.0mm cutting balloon) followed by  PCI to the pLCx (POBA 2.5mm balloon) and PCI to the LM into the LAD (5.0mm\par and 4.0mm [place] JUDY). Willl need to schedule a repeat procedure for laser atherectomy of the mRCA in-stent restenosis. IVUS imaging was performed on 9/11/2019 and the optimal way to prevent restenosis is to perform a repeat procedure with laser in order to achieve full expansion of the under-expanded stent. \par Will schedule the procedure in October- November 2019. \par \par Continue ASA, clopidogrel and Xarelto for 2 weeks. After 2 weeks, stop ASA and continue clopidogrel and xarelto. \par \par Stop ranexa and SL nitroglycerin. Continue bisoprolol. BP was high today,  so we added lisinopril 20 mg daily. Advised against the use of NSAIDs. He does not generally use etodolac. Only as needed, once every few months. \par \par Follow up with Dr. Colón.

## 2019-11-16 NOTE — REVIEW OF SYSTEMS
[see HPI] : see HPI [Chest Pain] : chest pain [Negative] : Endocrine [Leg Claudication] : no intermittent leg claudication [Lower Ext Edema] : no extremity edema [Dyspnea on exertion] : not dyspnea during exertion

## 2019-11-16 NOTE — REVIEW OF SYSTEMS
[see HPI] : see HPI [Negative] : Heme/Lymph [Dyspnea on exertion] : not dyspnea during exertion [Lower Ext Edema] : no extremity edema [Leg Claudication] : no intermittent leg claudication

## 2019-11-16 NOTE — HISTORY OF PRESENT ILLNESS
[FreeTextEntry1] : 63 y.o M with CAD s/p MI in 2008 and multiple stents, chronic stable angina on medical therapy, AFIB (Xarelto), HTN, HLD, PAMELA referred to the [facility] clinic for evaluation because of  angina despite medical therapy and the presence of a LCX . He is currently on bisoprolol 10mg and ranolazine 1000mg. He also needs uses sublingual nitroglycerin almost daily in order to relieve angina. He is not on any longer acting nitrates. \par \par He reports that he is experiencing exertional chest pain when walking about 2.5 blocks. The chest pain is relieved with rest or by taking SL nitroglycerin. He also reports that occasionally he wakes up from sleep with chest pain. The chest pain is relieved by SL nitroglycerin and he is able to go back to sleep within a few minutes. \par \par He is currently on Xarelto for AFIB and Prasugrel. He reports that he has not experienced any bleeding while on this combination of medications. No aspirin. \par \par He has read about the option of  PCI for angina relief and he is very interested to have the procedure done. \par \par \par Cardiac cath 4/23/2019 \par INDICATIONS: Subsequent NSTEMI (small Troponin leak in the setting of AFIB with RVR)\par VENTRICLES: Analysis of regional contractile function demonstrated mild inferolateral hypokinesis (consistent with the LCX ). EF estimated was 50 %.\par CORONARY VESSELS: The coronary circulation is right dominant.\par LM:   --  LM: Angiography showed moderate atherosclerosis.\par LAD:   --  Proximal LAD: Angiography showed moderate atherosclerosis.\par --  Mid LAD: There was a 20 % stenosis at the site of a prior stent.\par --  D1: Angiography showed moderate atherosclerosis. There was a tubular 20 % stenosis at the site of a prior stent.\par CX:   --  Proximal circumflex: There was a 100 % stenosis. There was good\par collateral blood supply to the distal myocardium. This lesion is a likely culprit for the patient's recent myocardial infarction.\par RCA:   --  Proximal RCA: There was a 0 % stenosis at the site of a prior stent.\par --  RPDA: There was a 0 % stenosis at the site of a prior stent.\par

## 2019-11-16 NOTE — DISCUSSION/SUMMARY
[FreeTextEntry1] : 63 y.o M with CAD s/p MI in 2008 and multiple stents, chronic stable angina on medical therapy, AFIB (Xarelto), HTN, HLD, PAMELA referred to the [facility] clinic for evaluation because of  angina despite medical therapy and the presence of a LCX .\par \par -  CAD / Stable angina despite medical therapy:  Will plan for LCX  PCI on 9/4 or 9/11. Will call the patient with  the exact date of the procedure and provide instructions prior to the procedure. The patient will need to hold xarelto for 48 hours prior to the procedure because of the need for femoral artery access. No need top stop prasugrel for now. Will likely switch to ASA, clopidogrel and Xarelto for a short period of time after the procedure. Continue antianginal therapy with bisoprolol and ranexa. Will discuss with Dr. Colón before adding long acting nitrates. \par \par Discussed with patient the risk of the procedure including but not limited to the risk of death, perforation of the arteries, bleeding, myocardial infarction, emergent cardiac surgery, intubation hemodynamic support. \par \par Also discussed the possibility of needing two procedures in order to achieve optimal revascularization. \par \par Patient agreed to proceed with the procedure after explaining r/b/a/c/i (risk, benefit, alternatives, complications and indications). \par

## 2019-11-16 NOTE — HISTORY OF PRESENT ILLNESS
[FreeTextEntry1] : 63 y.o M with CAD s/p MI in 2008 and multiple stents, chronic stable angina on medical therapy, AFIB (Xarelto), HTN, HLD, PAMELA referred to the [facility] clinic for evaluation because of  angina despite medical therapy and the presence of a LCX .\par \par On 9/11/2019 he underwent a PCI to the mRCA (POBA 4.0mm cutting balloon) followed by \par PCI to the pLCx (POBA 2.5mm balloon) and PCI to the LM into the LAD (5.0mm and 4.0mm [place] JUDY). \par \par He reports that his angina has now resolved and he feels that he is back to normal. He is very grateful for identifying the problem and treating it.  He is still taking all of his anti-anginal medications and he would like to stop some of them after discussing with us. \par \par No bleeding issues. No chest pain. No SOB. No palpitations. \par \par Cardiac Cath 9/11/2019 \par CONTRAST GIVEN: Omnipaque 230 ml.\par MEDICATIONS GIVEN: Fentanyl, 25 mcg, IV. Midazolam, 1 mg, IV. Fentanyl, 50 mcg, IV. Midazolam, 1 mg, IV. Fentanyl, 50 mcg, IV. Aspirin, 325 mg, PO. Heparin, 76287 units, IV. Heparin, 3000 units, IV. Heparin, 3000 units, IV. Cardene, 500 mcg.\par VENTRICLES: No left ventriculogram was performed.\par CORONARY VESSELS: The coronary circulation is right dominant.\par LM:   --  LM: There was a 80 % stenosis.\par LAD:   --  Proximal LAD: There was a 70 % stenosis.\par CX:   --  Proximal circumflex: There was a diffuse 100 % stenosis. There\par was ZOEY grade 0 flow through the vessel (no flow) and a moderate-sized\par vascular territory distal to the lesion. This is a likely culprit for the\par patient's clinical presentation. An intervention was performed.\par RCA:   --  Mid RCA: There was a diffuse 99 % stenosis at the site of a\par prior angioplasty with stent placement. There was ZOEY grade 2 flow\par through the vessel (partial perfusion). This is a likely culprit for the\par patient's clinical presentation. An intervention was performed.\par COMPLICATIONS: There were no complications.\par INTERVENTIONAL RECOMMENDATIONS:\par 1. Successful PCI to the mRCA (POBA 4.0mm cutting balloon) followed by \par PCI to the pLCx (POBA 2.5mm balloon) and PCI to the LM into the LAD (5.0mm\par and 4.0mm [place] JUDY) in the setting of refractory angina.\par 2. DAPT.\par 3. Will bring patient back for laser PTA to the mRCA ISR given the\par complexity.\par \par Cardiac cath 4/23/2019 \par INDICATIONS: Subsequent NSTEMI (small Troponin leak in the setting of AFIB with RVR)\par VENTRICLES: Analysis of regional contractile function demonstrated mild inferolateral hypokinesis (consistent with the LCX ). EF estimated was 50 %.\par CORONARY VESSELS: The coronary circulation is right dominant.\par LM:   --  LM: Angiography showed moderate atherosclerosis.\par LAD:   --  Proximal LAD: Angiography showed moderate atherosclerosis.\par --  Mid LAD: There was a 20 % stenosis at the site of a prior stent.\par --  D1: Angiography showed moderate atherosclerosis. There was a tubular 20 % stenosis at the site of a prior stent.\par CX:   --  Proximal circumflex: There was a 100 % stenosis. There was good\par collateral blood supply to the distal myocardium. This lesion is a likely culprit for the patient's recent myocardial infarction.\par RCA:   --  Proximal RCA: There was a 0 % stenosis at the site of a prior stent.\par --  RPDA: There was a 0 % stenosis at the site of a prior stent.\par

## 2019-11-22 ENCOUNTER — MOBILE ON CALL (OUTPATIENT)
Age: 64
End: 2019-11-22

## 2019-11-25 ENCOUNTER — RX RENEWAL (OUTPATIENT)
Age: 64
End: 2019-11-25

## 2019-12-09 ENCOUNTER — RX RENEWAL (OUTPATIENT)
Age: 64
End: 2019-12-09

## 2020-01-26 ENCOUNTER — RX RENEWAL (OUTPATIENT)
Age: 65
End: 2020-01-26

## 2020-01-29 ENCOUNTER — RX RENEWAL (OUTPATIENT)
Age: 65
End: 2020-01-29

## 2020-01-29 ENCOUNTER — TRANSCRIPTION ENCOUNTER (OUTPATIENT)
Age: 65
End: 2020-01-29

## 2020-03-01 NOTE — PATIENT PROFILE ADULT - NSPROEDAREADYLEARN_GEN_A_NUR
CONSTITUTIONAL: No fever, weight loss, or fatigue  EYES: No eye pain, visual disturbances, or discharge  ENMT:  No difficulty hearing, tinnitus, vertigo; No sinus or throat pain  NECK: No pain or stiffness  BREASTS: No pain, masses, or nipple discharge  RESPIRATORY: No cough, wheezing, chills or hemoptysis; No shortness of breath  CARDIOVASCULAR: No chest pain, palpitations, dizziness, or leg swelling  GASTROINTESTINAL: No abdominal or epigastric pain. No nausea, vomiting, or hematemesis; No diarrhea or constipation. No melena or hematochezia.  GENITOURINARY: No dysuria, frequency, hematuria, or incontinence  NEUROLOGICAL: No headaches, memory loss, loss of strength, numbness, or tremors  SKIN: No itching, burning, rashes, or lesions   LYMPH NODES: No enlarged glands  ENDOCRINE: No heat or cold intolerance; No hair loss  MUSCULOSKELETAL: No joint pain or swelling; No muscle, back, or extremity pain  PSYCHIATRIC: No depression, anxiety, mood swings, or difficulty sleeping  HEME/LYMPH: No easy bruising, or bleeding gums  ALLERGY AND IMMUNOLOGIC: No hives or eczema
none

## 2020-03-09 DIAGNOSIS — R58 HEMORRHAGE, NOT ELSEWHERE CLASSIFIED: ICD-10-CM

## 2020-03-11 ENCOUNTER — LABORATORY RESULT (OUTPATIENT)
Age: 65
End: 2020-03-11

## 2020-03-11 LAB
ALBUMIN SERPL ELPH-MCNC: 4.3 G/DL
ALP BLD-CCNC: 92 U/L
ALT SERPL-CCNC: 32 U/L
ANION GAP SERPL CALC-SCNC: 15 MMOL/L
AST SERPL-CCNC: 23 U/L
BASOPHILS # BLD AUTO: 0.05 K/UL
BASOPHILS NFR BLD AUTO: 0.7 %
BILIRUB SERPL-MCNC: 0.7 MG/DL
BUN SERPL-MCNC: 19 MG/DL
CALCIUM SERPL-MCNC: 9.6 MG/DL
CHLORIDE SERPL-SCNC: 105 MMOL/L
CHOLEST SERPL-MCNC: 158 MG/DL
CHOLEST/HDLC SERPL: 4.2 RATIO
CO2 SERPL-SCNC: 23 MMOL/L
CREAT SERPL-MCNC: 0.97 MG/DL
EOSINOPHIL # BLD AUTO: 0.2 K/UL
EOSINOPHIL NFR BLD AUTO: 2.8 %
ESTIMATED AVERAGE GLUCOSE: 111 MG/DL
FT4I SERPL CALC-MCNC: 6.7 INDEX
GLUCOSE SERPL-MCNC: 101 MG/DL
HBA1C MFR BLD HPLC: 5.5 %
HCT VFR BLD CALC: 46.8 %
HDLC SERPL-MCNC: 38 MG/DL
HGB BLD-MCNC: 15.1 G/DL
IMM GRANULOCYTES NFR BLD AUTO: 0.3 %
LDLC SERPL CALC-MCNC: 85 MG/DL
LYMPHOCYTES # BLD AUTO: 1.52 K/UL
LYMPHOCYTES NFR BLD AUTO: 21.6 %
MAN DIFF?: NORMAL
MCHC RBC-ENTMCNC: 29.2 PG
MCHC RBC-ENTMCNC: 32.3 GM/DL
MCV RBC AUTO: 90.5 FL
MONOCYTES # BLD AUTO: 0.42 K/UL
MONOCYTES NFR BLD AUTO: 6 %
NEUTROPHILS # BLD AUTO: 4.82 K/UL
NEUTROPHILS NFR BLD AUTO: 68.6 %
PLATELET # BLD AUTO: 191 K/UL
POTASSIUM SERPL-SCNC: 4.4 MMOL/L
PROT SERPL-MCNC: 6.5 G/DL
PSA FREE FLD-MCNC: 20 %
PSA FREE SERPL-MCNC: 0.66 NG/ML
PSA SERPL-MCNC: 3.26 NG/ML
RBC # BLD: 5.17 M/UL
RBC # FLD: 13.5 %
SODIUM SERPL-SCNC: 143 MMOL/L
T3RU NFR SERPL: 1.1 TBI
T4 FREE SERPL-MCNC: 1.1 NG/DL
T4 SERPL-MCNC: 7.1 UG/DL
TRIGL SERPL-MCNC: 176 MG/DL
TSH SERPL-ACNC: 2.05 UIU/ML
WBC # FLD AUTO: 7.03 K/UL

## 2020-04-28 ENCOUNTER — APPOINTMENT (OUTPATIENT)
Dept: CARDIOLOGY | Facility: CLINIC | Age: 65
End: 2020-04-28
Payer: COMMERCIAL

## 2020-04-28 ENCOUNTER — NON-APPOINTMENT (OUTPATIENT)
Age: 65
End: 2020-04-28

## 2020-04-28 ENCOUNTER — RX RENEWAL (OUTPATIENT)
Age: 65
End: 2020-04-28

## 2020-04-28 VITALS
DIASTOLIC BLOOD PRESSURE: 88 MMHG | HEIGHT: 70 IN | OXYGEN SATURATION: 96 % | WEIGHT: 224 LBS | BODY MASS INDEX: 32.07 KG/M2 | SYSTOLIC BLOOD PRESSURE: 126 MMHG | HEART RATE: 59 BPM

## 2020-04-28 PROCEDURE — 99215 OFFICE O/P EST HI 40 MIN: CPT

## 2020-04-28 PROCEDURE — 93000 ELECTROCARDIOGRAM COMPLETE: CPT

## 2020-05-06 NOTE — PROGRESS NOTE ADULT - REASON FOR ADMISSION
Angina
Chest pain
Angina
Scc Poorly Differentiated Histology Text: Full thickness keratinocyte atypia is observed microscopically. Focal dyskeratosis and apoptosis of keratinocytes is observed. The epidermis is acanthotic with growth of atypical keratinocytes beyond the level of the sebaceous glands, most keratinocytes show little to no keratinization.

## 2020-05-08 RX ORDER — LISINOPRIL 20 MG/1
20 TABLET ORAL DAILY
Qty: 90 | Refills: 1 | Status: DISCONTINUED | COMMUNITY
Start: 2019-09-19 | End: 2020-05-08

## 2020-05-08 RX ORDER — RANOLAZINE 1000 MG/1
1000 TABLET, EXTENDED RELEASE ORAL
Qty: 180 | Refills: 0 | Status: DISCONTINUED | COMMUNITY
Start: 2017-04-06 | End: 2020-05-08

## 2020-09-03 ENCOUNTER — NON-APPOINTMENT (OUTPATIENT)
Age: 65
End: 2020-09-03

## 2020-09-03 ENCOUNTER — APPOINTMENT (OUTPATIENT)
Dept: CARDIOLOGY | Facility: CLINIC | Age: 65
End: 2020-09-03
Payer: COMMERCIAL

## 2020-09-03 VITALS
TEMPERATURE: 98 F | DIASTOLIC BLOOD PRESSURE: 80 MMHG | OXYGEN SATURATION: 95 % | SYSTOLIC BLOOD PRESSURE: 120 MMHG | HEART RATE: 59 BPM | HEIGHT: 70 IN | BODY MASS INDEX: 31.5 KG/M2 | WEIGHT: 220 LBS

## 2020-09-03 PROCEDURE — 99214 OFFICE O/P EST MOD 30 MIN: CPT

## 2020-09-03 PROCEDURE — 93000 ELECTROCARDIOGRAM COMPLETE: CPT

## 2020-09-21 LAB
ALBUMIN SERPL ELPH-MCNC: 4.1 G/DL
ALP BLD-CCNC: 95 U/L
ALT SERPL-CCNC: 33 U/L
ANION GAP SERPL CALC-SCNC: 17 MMOL/L
AST SERPL-CCNC: 23 U/L
BASOPHILS # BLD AUTO: 0.05 K/UL
BASOPHILS NFR BLD AUTO: 0.9 %
BILIRUB SERPL-MCNC: 0.4 MG/DL
BUN SERPL-MCNC: 25 MG/DL
CALCIUM SERPL-MCNC: 9.4 MG/DL
CHLORIDE SERPL-SCNC: 101 MMOL/L
CHOLEST SERPL-MCNC: 218 MG/DL
CHOLEST/HDLC SERPL: 7.5 RATIO
CO2 SERPL-SCNC: 24 MMOL/L
CREAT SERPL-MCNC: 1.16 MG/DL
EOSINOPHIL # BLD AUTO: 0.23 K/UL
EOSINOPHIL NFR BLD AUTO: 4.2 %
ESTIMATED AVERAGE GLUCOSE: 111 MG/DL
GLUCOSE SERPL-MCNC: 99 MG/DL
HBA1C MFR BLD HPLC: 5.5 %
HCT VFR BLD CALC: 44.3 %
HDLC SERPL-MCNC: 29 MG/DL
HGB BLD-MCNC: 14.6 G/DL
IMM GRANULOCYTES NFR BLD AUTO: 0.5 %
LDLC SERPL CALC-MCNC: NORMAL MG/DL
LYMPHOCYTES # BLD AUTO: 1.44 K/UL
LYMPHOCYTES NFR BLD AUTO: 26 %
MAN DIFF?: NORMAL
MCHC RBC-ENTMCNC: 30.4 PG
MCHC RBC-ENTMCNC: 33 GM/DL
MCV RBC AUTO: 92.3 FL
MONOCYTES # BLD AUTO: 0.43 K/UL
MONOCYTES NFR BLD AUTO: 7.8 %
NEUTROPHILS # BLD AUTO: 3.35 K/UL
NEUTROPHILS NFR BLD AUTO: 60.6 %
PLATELET # BLD AUTO: 189 K/UL
POTASSIUM SERPL-SCNC: 4 MMOL/L
PROT SERPL-MCNC: 6.5 G/DL
RBC # BLD: 4.8 M/UL
RBC # FLD: 13.2 %
SODIUM SERPL-SCNC: 142 MMOL/L
T4 SERPL-MCNC: 6.3 UG/DL
TRIGL SERPL-MCNC: 479 MG/DL
TSH SERPL-ACNC: 1.4 UIU/ML
WBC # FLD AUTO: 5.53 K/UL

## 2020-09-22 ENCOUNTER — RX RENEWAL (OUTPATIENT)
Age: 65
End: 2020-09-22

## 2020-10-15 ENCOUNTER — RX RENEWAL (OUTPATIENT)
Age: 65
End: 2020-10-15

## 2020-10-27 ENCOUNTER — NON-APPOINTMENT (OUTPATIENT)
Age: 65
End: 2020-10-27

## 2020-10-27 ENCOUNTER — APPOINTMENT (OUTPATIENT)
Dept: CARDIOLOGY | Facility: CLINIC | Age: 65
End: 2020-10-27
Payer: COMMERCIAL

## 2020-10-27 VITALS
BODY MASS INDEX: 32.21 KG/M2 | HEART RATE: 54 BPM | SYSTOLIC BLOOD PRESSURE: 123 MMHG | DIASTOLIC BLOOD PRESSURE: 81 MMHG | HEIGHT: 70 IN | OXYGEN SATURATION: 98 % | WEIGHT: 225 LBS

## 2020-10-27 DIAGNOSIS — Z23 ENCOUNTER FOR IMMUNIZATION: ICD-10-CM

## 2020-10-27 PROCEDURE — 99072 ADDL SUPL MATRL&STAF TM PHE: CPT

## 2020-10-27 PROCEDURE — G0008: CPT

## 2020-10-27 PROCEDURE — 99215 OFFICE O/P EST HI 40 MIN: CPT | Mod: 25

## 2020-10-27 PROCEDURE — 90682 RIV4 VACC RECOMBINANT DNA IM: CPT

## 2020-10-27 PROCEDURE — 93000 ELECTROCARDIOGRAM COMPLETE: CPT

## 2020-10-27 RX ORDER — RANOLAZINE 500 MG/1
500 TABLET, EXTENDED RELEASE ORAL
Qty: 180 | Refills: 3 | Status: DISCONTINUED | COMMUNITY
Start: 2019-12-09 | End: 2020-10-27

## 2020-10-27 RX ORDER — CLARITHROMYCIN 500 MG/1
500 TABLET, FILM COATED ORAL
Qty: 14 | Refills: 1 | Status: DISCONTINUED | COMMUNITY
Start: 2020-04-28 | End: 2020-10-27

## 2020-10-28 RX ORDER — VALSARTAN AND HYDROCHLOROTHIAZIDE 320; 25 MG/1; MG/1
320-25 TABLET, FILM COATED ORAL DAILY
Qty: 90 | Refills: 1 | Status: DISCONTINUED | COMMUNITY
Start: 2020-06-26 | End: 2020-10-28

## 2020-11-10 ENCOUNTER — TRANSCRIPTION ENCOUNTER (OUTPATIENT)
Age: 65
End: 2020-11-10

## 2020-11-25 ENCOUNTER — NON-APPOINTMENT (OUTPATIENT)
Age: 65
End: 2020-11-25

## 2020-11-25 ENCOUNTER — APPOINTMENT (OUTPATIENT)
Dept: CARDIOLOGY | Facility: CLINIC | Age: 65
End: 2020-11-25
Payer: COMMERCIAL

## 2020-11-25 VITALS
HEART RATE: 56 BPM | SYSTOLIC BLOOD PRESSURE: 121 MMHG | BODY MASS INDEX: 32.35 KG/M2 | OXYGEN SATURATION: 96 % | HEIGHT: 70 IN | DIASTOLIC BLOOD PRESSURE: 77 MMHG | WEIGHT: 226 LBS

## 2020-11-25 DIAGNOSIS — R10.31 RIGHT LOWER QUADRANT PAIN: ICD-10-CM

## 2020-11-25 DIAGNOSIS — M25.561 PAIN IN RIGHT KNEE: ICD-10-CM

## 2020-11-25 PROCEDURE — 99215 OFFICE O/P EST HI 40 MIN: CPT

## 2020-12-09 ENCOUNTER — RX RENEWAL (OUTPATIENT)
Age: 65
End: 2020-12-09

## 2020-12-09 ENCOUNTER — APPOINTMENT (OUTPATIENT)
Dept: CT IMAGING | Facility: HOSPITAL | Age: 65
End: 2020-12-09
Payer: COMMERCIAL

## 2020-12-09 ENCOUNTER — OUTPATIENT (OUTPATIENT)
Dept: OUTPATIENT SERVICES | Facility: HOSPITAL | Age: 65
LOS: 1 days | End: 2020-12-09
Payer: COMMERCIAL

## 2020-12-09 DIAGNOSIS — Z95.5 PRESENCE OF CORONARY ANGIOPLASTY IMPLANT AND GRAFT: Chronic | ICD-10-CM

## 2020-12-09 DIAGNOSIS — R10.31 RIGHT LOWER QUADRANT PAIN: ICD-10-CM

## 2020-12-09 DIAGNOSIS — C43.9 MALIGNANT MELANOMA OF SKIN, UNSPECIFIED: Chronic | ICD-10-CM

## 2020-12-09 PROCEDURE — 74176 CT ABD & PELVIS W/O CONTRAST: CPT | Mod: 26

## 2020-12-09 PROCEDURE — 74176 CT ABD & PELVIS W/O CONTRAST: CPT

## 2021-01-09 ENCOUNTER — RX RENEWAL (OUTPATIENT)
Age: 66
End: 2021-01-09

## 2021-01-15 LAB
CHOLEST SERPL-MCNC: 167 MG/DL
HDLC SERPL-MCNC: 31 MG/DL
LDLC SERPL CALC-MCNC: 70 MG/DL
NONHDLC SERPL-MCNC: 136 MG/DL
TRIGL SERPL-MCNC: 328 MG/DL

## 2021-02-23 ENCOUNTER — RX RENEWAL (OUTPATIENT)
Age: 66
End: 2021-02-23

## 2021-04-08 ENCOUNTER — RX RENEWAL (OUTPATIENT)
Age: 66
End: 2021-04-08

## 2021-05-13 NOTE — PATIENT PROFILE ADULT - NSTRANSFERBELONGINGSRESP_GEN_A_NUR
[facility] 76  coding opportunities          Chart reviewed, no opportunity found: CHART REVIEWED, NO OPPORTUNITY FOUND              Patients insurance company: [employer] (Medicare Advantage and Commercial)
yes

## 2021-05-21 LAB
25(OH)D3 SERPL-MCNC: 40.7 NG/ML
ALBUMIN SERPL ELPH-MCNC: 4.4 G/DL
ALP BLD-CCNC: 108 U/L
ALT SERPL-CCNC: 35 U/L
ANION GAP SERPL CALC-SCNC: 16 MMOL/L
AST SERPL-CCNC: 22 U/L
BASOPHILS # BLD AUTO: 0.05 K/UL
BASOPHILS NFR BLD AUTO: 0.7 %
BILIRUB SERPL-MCNC: 0.7 MG/DL
BUN SERPL-MCNC: 22 MG/DL
CALCIUM SERPL-MCNC: 9.7 MG/DL
CHLORIDE SERPL-SCNC: 103 MMOL/L
CHOLEST SERPL-MCNC: 175 MG/DL
CO2 SERPL-SCNC: 23 MMOL/L
CREAT SERPL-MCNC: 1.2 MG/DL
EOSINOPHIL # BLD AUTO: 0.25 K/UL
EOSINOPHIL NFR BLD AUTO: 3.3 %
ESTIMATED AVERAGE GLUCOSE: 111 MG/DL
GLUCOSE SERPL-MCNC: 111 MG/DL
HBA1C MFR BLD HPLC: 5.5 %
HCT VFR BLD CALC: 48.6 %
HDLC SERPL-MCNC: 34 MG/DL
HGB BLD-MCNC: 15.8 G/DL
IMM GRANULOCYTES NFR BLD AUTO: 0.4 %
LDLC SERPL CALC-MCNC: 82 MG/DL
LDLC SERPL DIRECT ASSAY-MCNC: 91 MG/DL
LYMPHOCYTES # BLD AUTO: 1.92 K/UL
LYMPHOCYTES NFR BLD AUTO: 25.2 %
MAN DIFF?: NORMAL
MCHC RBC-ENTMCNC: 29.8 PG
MCHC RBC-ENTMCNC: 32.5 GM/DL
MCV RBC AUTO: 91.7 FL
MONOCYTES # BLD AUTO: 0.58 K/UL
MONOCYTES NFR BLD AUTO: 7.6 %
NEUTROPHILS # BLD AUTO: 4.78 K/UL
NEUTROPHILS NFR BLD AUTO: 62.8 %
NONHDLC SERPL-MCNC: 141 MG/DL
PLATELET # BLD AUTO: 202 K/UL
POTASSIUM SERPL-SCNC: 4.3 MMOL/L
PROT SERPL-MCNC: 7.1 G/DL
PSA FREE FLD-MCNC: 20 %
PSA FREE SERPL-MCNC: 0.81 NG/ML
PSA SERPL-MCNC: 4 NG/ML
RBC # BLD: 5.3 M/UL
RBC # FLD: 13.3 %
SODIUM SERPL-SCNC: 142 MMOL/L
T3 SERPL-MCNC: 124 NG/DL
T4 SERPL-MCNC: 7.4 UG/DL
TRIGL SERPL-MCNC: 293 MG/DL
TSH SERPL-ACNC: 2.05 UIU/ML
WBC # FLD AUTO: 7.61 K/UL

## 2021-05-26 ENCOUNTER — RX RENEWAL (OUTPATIENT)
Age: 66
End: 2021-05-26

## 2021-06-02 ENCOUNTER — APPOINTMENT (OUTPATIENT)
Dept: CARDIOLOGY | Facility: CLINIC | Age: 66
End: 2021-06-02
Payer: COMMERCIAL

## 2021-06-02 ENCOUNTER — NON-APPOINTMENT (OUTPATIENT)
Age: 66
End: 2021-06-02

## 2021-06-02 VITALS
SYSTOLIC BLOOD PRESSURE: 115 MMHG | BODY MASS INDEX: 32.93 KG/M2 | DIASTOLIC BLOOD PRESSURE: 74 MMHG | OXYGEN SATURATION: 96 % | HEART RATE: 57 BPM | WEIGHT: 230 LBS | HEIGHT: 70 IN

## 2021-06-02 DIAGNOSIS — N52.9 MALE ERECTILE DYSFUNCTION, UNSPECIFIED: ICD-10-CM

## 2021-06-02 PROCEDURE — 99072 ADDL SUPL MATRL&STAF TM PHE: CPT

## 2021-06-02 PROCEDURE — 99214 OFFICE O/P EST MOD 30 MIN: CPT

## 2021-06-02 PROCEDURE — 93000 ELECTROCARDIOGRAM COMPLETE: CPT

## 2021-06-02 RX ORDER — SILDENAFIL 100 MG/1
100 TABLET, FILM COATED ORAL
Qty: 6 | Refills: 5 | Status: DISCONTINUED | COMMUNITY
Start: 2019-02-14 | End: 2021-06-02

## 2021-06-16 ENCOUNTER — RX RENEWAL (OUTPATIENT)
Age: 66
End: 2021-06-16

## 2021-06-21 ENCOUNTER — RX RENEWAL (OUTPATIENT)
Age: 66
End: 2021-06-21

## 2021-07-18 ENCOUNTER — RX RENEWAL (OUTPATIENT)
Age: 66
End: 2021-07-18

## 2021-09-06 ENCOUNTER — TRANSCRIPTION ENCOUNTER (OUTPATIENT)
Age: 66
End: 2021-09-06

## 2021-10-06 ENCOUNTER — RX RENEWAL (OUTPATIENT)
Age: 66
End: 2021-10-06

## 2021-11-18 ENCOUNTER — RX RENEWAL (OUTPATIENT)
Age: 66
End: 2021-11-18

## 2021-12-01 ENCOUNTER — NON-APPOINTMENT (OUTPATIENT)
Age: 66
End: 2021-12-01

## 2021-12-01 ENCOUNTER — APPOINTMENT (OUTPATIENT)
Dept: CARDIOLOGY | Facility: CLINIC | Age: 66
End: 2021-12-01
Payer: COMMERCIAL

## 2021-12-01 VITALS
BODY MASS INDEX: 32.57 KG/M2 | SYSTOLIC BLOOD PRESSURE: 128 MMHG | OXYGEN SATURATION: 95 % | HEART RATE: 53 BPM | WEIGHT: 227 LBS | DIASTOLIC BLOOD PRESSURE: 84 MMHG

## 2021-12-01 DIAGNOSIS — G47.00 INSOMNIA, UNSPECIFIED: ICD-10-CM

## 2021-12-01 DIAGNOSIS — R53.83 OTHER FATIGUE: ICD-10-CM

## 2021-12-01 PROCEDURE — 99214 OFFICE O/P EST MOD 30 MIN: CPT

## 2021-12-01 PROCEDURE — 93000 ELECTROCARDIOGRAM COMPLETE: CPT

## 2021-12-02 LAB
ALBUMIN SERPL ELPH-MCNC: 4.4 G/DL
ALP BLD-CCNC: 99 U/L
ALT SERPL-CCNC: 33 U/L
ANION GAP SERPL CALC-SCNC: 13 MMOL/L
AST SERPL-CCNC: 21 U/L
BASOPHILS # BLD AUTO: 0.05 K/UL
BASOPHILS NFR BLD AUTO: 0.9 %
BILIRUB SERPL-MCNC: 0.9 MG/DL
BUN SERPL-MCNC: 21 MG/DL
CALCIUM SERPL-MCNC: 9.7 MG/DL
CHLORIDE SERPL-SCNC: 102 MMOL/L
CHOLEST SERPL-MCNC: 169 MG/DL
CO2 SERPL-SCNC: 26 MMOL/L
CREAT SERPL-MCNC: 1.09 MG/DL
EOSINOPHIL # BLD AUTO: 0.19 K/UL
EOSINOPHIL NFR BLD AUTO: 3.3 %
ESTIMATED AVERAGE GLUCOSE: 111 MG/DL
GLUCOSE SERPL-MCNC: 102 MG/DL
HBA1C MFR BLD HPLC: 5.5 %
HCT VFR BLD CALC: 47.6 %
HDLC SERPL-MCNC: 30 MG/DL
HGB BLD-MCNC: 15.8 G/DL
IMM GRANULOCYTES NFR BLD AUTO: 0.3 %
LDLC SERPL CALC-MCNC: 78 MG/DL
LYMPHOCYTES # BLD AUTO: 1.49 K/UL
LYMPHOCYTES NFR BLD AUTO: 25.8 %
MAN DIFF?: NORMAL
MCHC RBC-ENTMCNC: 30.8 PG
MCHC RBC-ENTMCNC: 33.2 GM/DL
MCV RBC AUTO: 92.8 FL
MONOCYTES # BLD AUTO: 0.43 K/UL
MONOCYTES NFR BLD AUTO: 7.4 %
NEUTROPHILS # BLD AUTO: 3.6 K/UL
NEUTROPHILS NFR BLD AUTO: 62.3 %
NONHDLC SERPL-MCNC: 140 MG/DL
PLATELET # BLD AUTO: 187 K/UL
POTASSIUM SERPL-SCNC: 4.4 MMOL/L
PROT SERPL-MCNC: 6.8 G/DL
RBC # BLD: 5.13 M/UL
RBC # FLD: 13.1 %
SODIUM SERPL-SCNC: 140 MMOL/L
T4 SERPL-MCNC: 8 UG/DL
TRIGL SERPL-MCNC: 309 MG/DL
TSH SERPL-ACNC: 1.55 UIU/ML
WBC # FLD AUTO: 5.78 K/UL

## 2021-12-20 ENCOUNTER — RX RENEWAL (OUTPATIENT)
Age: 66
End: 2021-12-20

## 2022-01-06 ENCOUNTER — RX RENEWAL (OUTPATIENT)
Age: 67
End: 2022-01-06

## 2022-01-18 LAB
APPEARANCE: CLEAR
BACTERIA UR CULT: NORMAL
BACTERIA: NEGATIVE
BILIRUBIN URINE: NEGATIVE
BLOOD URINE: ABNORMAL
COLOR: YELLOW
GLUCOSE QUALITATIVE U: NEGATIVE
HYALINE CASTS: 0 /LPF
KETONES URINE: NEGATIVE
LEUKOCYTE ESTERASE URINE: NEGATIVE
MICROSCOPIC-UA: NORMAL
NITRITE URINE: NEGATIVE
PH URINE: 6.5
PROTEIN URINE: NEGATIVE
RED BLOOD CELLS URINE: 14 /HPF
SPECIFIC GRAVITY URINE: 1.02
SQUAMOUS EPITHELIAL CELLS: 0 /HPF
UROBILINOGEN URINE: NORMAL
WHITE BLOOD CELLS URINE: 0 /HPF

## 2022-03-02 NOTE — DISCHARGE NOTE PROVIDER - CARE PROVIDER_API CALL
Recommended observation. Melani Esteves)  Cardiology; Internal Medicine; Interventional Cardiology  [facility] Dept of Cardiology, [address] [place] [address]  Phone: [phone]  Fax: [phone]  Follow Up Time:

## 2022-03-31 ENCOUNTER — RX RENEWAL (OUTPATIENT)
Age: 67
End: 2022-03-31

## 2022-06-01 ENCOUNTER — APPOINTMENT (OUTPATIENT)
Dept: CARDIOLOGY | Facility: CLINIC | Age: 67
End: 2022-06-01
Payer: COMMERCIAL

## 2022-06-01 ENCOUNTER — NON-APPOINTMENT (OUTPATIENT)
Age: 67
End: 2022-06-01

## 2022-06-01 VITALS
OXYGEN SATURATION: 95 % | HEART RATE: 54 BPM | DIASTOLIC BLOOD PRESSURE: 77 MMHG | HEIGHT: 70 IN | BODY MASS INDEX: 32.07 KG/M2 | SYSTOLIC BLOOD PRESSURE: 118 MMHG | WEIGHT: 224 LBS

## 2022-06-01 PROCEDURE — 99215 OFFICE O/P EST HI 40 MIN: CPT

## 2022-06-01 PROCEDURE — 36415 COLL VENOUS BLD VENIPUNCTURE: CPT

## 2022-06-01 PROCEDURE — 93000 ELECTROCARDIOGRAM COMPLETE: CPT

## 2022-06-01 RX ORDER — RIVAROXABAN 20 MG/1
20 TABLET, FILM COATED ORAL
Qty: 90 | Refills: 1 | Status: DISCONTINUED | COMMUNITY
Start: 2019-05-22 | End: 2022-06-01

## 2022-06-02 LAB
ALBUMIN SERPL ELPH-MCNC: 4.1 G/DL
ALP BLD-CCNC: 82 U/L
ALT SERPL-CCNC: 36 U/L
ANION GAP SERPL CALC-SCNC: 14 MMOL/L
AST SERPL-CCNC: 21 U/L
BASOPHILS # BLD AUTO: 0.05 K/UL
BASOPHILS NFR BLD AUTO: 0.8 %
BILIRUB SERPL-MCNC: 0.8 MG/DL
BUN SERPL-MCNC: 20 MG/DL
CALCIUM SERPL-MCNC: 8.9 MG/DL
CHLORIDE SERPL-SCNC: 106 MMOL/L
CHOLEST SERPL-MCNC: 185 MG/DL
CO2 SERPL-SCNC: 22 MMOL/L
CREAT SERPL-MCNC: 1.18 MG/DL
EGFR: 68 ML/MIN/1.73M2
EOSINOPHIL # BLD AUTO: 0.13 K/UL
EOSINOPHIL NFR BLD AUTO: 2 %
ESTIMATED AVERAGE GLUCOSE: 114 MG/DL
GLUCOSE SERPL-MCNC: 91 MG/DL
HBA1C MFR BLD HPLC: 5.6 %
HCT VFR BLD CALC: 44.8 %
HDLC SERPL-MCNC: 46 MG/DL
HGB BLD-MCNC: 14.7 G/DL
IMM GRANULOCYTES NFR BLD AUTO: 0.5 %
LDLC SERPL CALC-MCNC: 111 MG/DL
LYMPHOCYTES # BLD AUTO: 1.34 K/UL
LYMPHOCYTES NFR BLD AUTO: 20.7 %
MAN DIFF?: NORMAL
MCHC RBC-ENTMCNC: 30.9 PG
MCHC RBC-ENTMCNC: 32.8 GM/DL
MCV RBC AUTO: 94.1 FL
MONOCYTES # BLD AUTO: 0.41 K/UL
MONOCYTES NFR BLD AUTO: 6.3 %
NEUTROPHILS # BLD AUTO: 4.52 K/UL
NEUTROPHILS NFR BLD AUTO: 69.7 %
NONHDLC SERPL-MCNC: 139 MG/DL
PLATELET # BLD AUTO: 163 K/UL
POTASSIUM SERPL-SCNC: 4.5 MMOL/L
PROT SERPL-MCNC: 6.3 G/DL
PSA FREE FLD-MCNC: 14 %
PSA FREE SERPL-MCNC: 0.22 NG/ML
PSA SERPL-MCNC: 1.54 NG/ML
RBC # BLD: 4.76 M/UL
RBC # FLD: 13.9 %
SODIUM SERPL-SCNC: 142 MMOL/L
T4 SERPL-MCNC: 8.1 UG/DL
TRIGL SERPL-MCNC: 138 MG/DL
TSH SERPL-ACNC: 1.5 UIU/ML
WBC # FLD AUTO: 6.48 K/UL

## 2022-06-15 DIAGNOSIS — U07.1 COVID-19: ICD-10-CM

## 2022-06-24 ENCOUNTER — NON-APPOINTMENT (OUTPATIENT)
Age: 67
End: 2022-06-24

## 2022-06-24 ENCOUNTER — RX RENEWAL (OUTPATIENT)
Age: 67
End: 2022-06-24

## 2022-06-29 ENCOUNTER — RX RENEWAL (OUTPATIENT)
Age: 67
End: 2022-06-29

## 2022-06-29 ENCOUNTER — APPOINTMENT (OUTPATIENT)
Dept: CARDIOLOGY | Facility: CLINIC | Age: 67
End: 2022-06-29

## 2022-06-29 PROCEDURE — 93015 CV STRESS TEST SUPVJ I&R: CPT

## 2022-06-29 PROCEDURE — 78452 HT MUSCLE IMAGE SPECT MULT: CPT

## 2022-06-29 PROCEDURE — A9500: CPT

## 2022-07-11 ENCOUNTER — NON-APPOINTMENT (OUTPATIENT)
Age: 67
End: 2022-07-11

## 2022-07-11 ENCOUNTER — APPOINTMENT (OUTPATIENT)
Dept: CARDIOLOGY | Facility: CLINIC | Age: 67
End: 2022-07-11

## 2022-07-11 VITALS — HEART RATE: 78 BPM | WEIGHT: 220 LBS | OXYGEN SATURATION: 96 % | BODY MASS INDEX: 31.57 KG/M2

## 2022-07-11 PROCEDURE — 93000 ELECTROCARDIOGRAM COMPLETE: CPT

## 2022-07-11 PROCEDURE — 99214 OFFICE O/P EST MOD 30 MIN: CPT | Mod: 25

## 2022-08-30 ENCOUNTER — APPOINTMENT (OUTPATIENT)
Dept: ORTHOPEDIC SURGERY | Facility: CLINIC | Age: 67
End: 2022-08-30

## 2022-08-30 VITALS
HEIGHT: 70 IN | DIASTOLIC BLOOD PRESSURE: 80 MMHG | BODY MASS INDEX: 32.21 KG/M2 | WEIGHT: 225 LBS | HEART RATE: 66 BPM | SYSTOLIC BLOOD PRESSURE: 152 MMHG

## 2022-08-30 PROCEDURE — 99072 ADDL SUPL MATRL&STAF TM PHE: CPT

## 2022-08-30 PROCEDURE — 99243 OFF/OP CNSLTJ NEW/EST LOW 30: CPT

## 2022-08-30 PROCEDURE — 73564 X-RAY EXAM KNEE 4 OR MORE: CPT | Mod: LT

## 2022-08-30 NOTE — DISCUSSION/SUMMARY
[de-identified] : Pt is a pleasant 66 year old male with left  knee pain secondary to osteoarthritis, chondrocalcinosis, possible meniscus tear.   A lengthy discussion was held regarding the patients condition and treatment options including all risks, benefits, prognosis and outcomes of each were discussed in detail. It was discussed that his knee pain is likely multifactorial from mild osteoarthritis, chondrocalcinosis, and possible meniscus tear. Discussed pathophysiology of three conditions, including the fact that chondrocalcinosis decreased the pliability of the meniscus and predisposes to tear, further raising clinical suspicion. The patient would like to continue with conservative management at this time. Non-operative treatment was advised and a knee treatment handout was given and reviewed with the patient. Conservative treatment to consist of pain control with Acetaminophen, ice 15-20 minutes 1-3x per day, knee sleeve, and physical therapy 2x per week for 2 months. Patient is on Prasugrel so unable to take Aleve for anti-inflammatory. Physical therapy prescription provided. Discussed corticosteroid injection and since was not done here could lead to improvement as it could also decrease inflammation related to chondrocalcinosis. Risks include a few days of associated soreness, very low risk of infection. Since no acute flare today and pain improving, could lean either way with respect to CSI. Hyaluronic acid injections were also discussed but contraindicated in setting of chondrocalcinosis. Discussed obtaining MRI imaging however would be unlikely to change treatment at this time as would not rush to surgery, to which patient agrees as contralateral meniscus improved with physical therapy. Discussed that timing of improvement is variable/unpredictable. The patient will contact me if there are any concerns.  Follow up will be prn. The patient expressed understanding and all questions were answered.\par

## 2022-08-30 NOTE — PHYSICAL EXAM
[LE] : Sensory: Intact in bilateral lower extremities [PT] : posterior tibial 2+ and symmetric bilaterally [de-identified] : Pt is a pleasant 66 year old male in NAD and AAOx3. 31.6 BMI. The pt has a mildly antalgic gait. Physical examination of both knees reveals normal contours, skin intact with no signs of infection, no erythema, no swelling, no effusion, no distal lymphedema or phlebitis, no patholaxity. ROM of the left knee reveals -5°-120°. Pain with terminal flexion and extension. Strength is 5/5 within this arc of motion. There is pain on palpation to the medial joint line. No lateral joint line TTP. Patient noted to have genu varus . There are no neurological deficits. [de-identified] : X-rays were ordered, obtained, and interpreted by me today: 4 views of the left knee demonstrate mild osteoarthritis with osteophyte formation, chondrocalcinosis of meniscus, possible Jonathan-Stieda lesion likely chronic, with no acute fracture or dislocation.\par

## 2022-08-30 NOTE — REVIEW OF SYSTEMS
[Joint Pain] : joint pain [Joint Stiffness] : joint stiffness [Joint Swelling] : joint swelling [Feeling Tired] : fatigue [Muscle Weakness] : muscle weakness [Easy Bleeding] : a tendency for easy bleeding [Negative] : Psychiatric

## 2022-08-30 NOTE — HISTORY OF PRESENT ILLNESS
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 He reports swelling to the knee at that time he went to [facility] and had a cortisone injection in early June which worked for a week. He states his symptoms are improving but it still bothers him. He states that his symptoms does NOT keep him up at night now. He states the pain is a 4/10 at this visit and a 8/10 at the worse. He does take Motrin and Tylenol which does help. He states that his knee is slightly swollen. He does wear a brace for support at work. He denies any numbness or tingling. He states that his knee is painful in the morning and gets better though out the day. He reports that the pain is mostly in the center of his knee with weight bearing. Stairs is difficult to transverse. He does feel weak in his knees. Patient has not trialed physical therapy yet. He reports that he swam in high school and college. Patient has a history of right meniscus tear that improved with physical therapy without operative intervention. \par \par Hx: Heart Attacks x 2, HTN, ASA and Prasugrel for Stents x 9 stents HLD\par

## 2022-08-30 NOTE — CONSULT LETTER
[Dear  ___] : Dear  [unfilled], [FreeTextEntry1] : I had the pleasure of evaluating your patient in the office today for complaints of left knee pain secondary to osteoarthritis and possible meniscus tear. I have enclosed a copy of today's office notes for your charts and for your review.\par \par Sincerely, \par \par Jose Eduardo Kurtz M.D.\par Professor and \par Department of Orthopedic Surgery\par [facility] [place]\par

## 2022-09-02 DIAGNOSIS — M54.50 LOW BACK PAIN, UNSPECIFIED: ICD-10-CM

## 2022-09-13 ENCOUNTER — RX RENEWAL (OUTPATIENT)
Age: 67
End: 2022-09-13

## 2022-12-07 ENCOUNTER — NON-APPOINTMENT (OUTPATIENT)
Age: 67
End: 2022-12-07

## 2022-12-07 ENCOUNTER — APPOINTMENT (OUTPATIENT)
Dept: CARDIOLOGY | Facility: CLINIC | Age: 67
End: 2022-12-07

## 2022-12-07 VITALS
BODY MASS INDEX: 32.21 KG/M2 | DIASTOLIC BLOOD PRESSURE: 70 MMHG | HEIGHT: 70 IN | OXYGEN SATURATION: 95 % | HEART RATE: 55 BPM | SYSTOLIC BLOOD PRESSURE: 112 MMHG | WEIGHT: 225 LBS

## 2022-12-07 DIAGNOSIS — R05.3 CHRONIC COUGH: ICD-10-CM

## 2022-12-07 DIAGNOSIS — R31.9 HEMATURIA, UNSPECIFIED: ICD-10-CM

## 2022-12-07 DIAGNOSIS — J45.909 UNSPECIFIED ASTHMA, UNCOMPLICATED: ICD-10-CM

## 2022-12-07 DIAGNOSIS — N40.0 BENIGN PROSTATIC HYPERPLASIA WITHOUT LOWER URINARY TRACT SYMPMS: ICD-10-CM

## 2022-12-07 PROCEDURE — 99214 OFFICE O/P EST MOD 30 MIN: CPT

## 2022-12-07 RX ORDER — FINASTERIDE 5 MG/1
5 TABLET, FILM COATED ORAL
Refills: 0 | Status: ACTIVE | COMMUNITY
Start: 2022-08-12

## 2022-12-07 RX ORDER — LEVALBUTEROL TARTRATE 45 UG/1
45 AEROSOL, METERED ORAL
Qty: 2 | Refills: 3 | Status: ACTIVE | COMMUNITY
Start: 2022-12-07 | End: 1900-01-01

## 2022-12-07 RX ORDER — BUDESONIDE AND FORMOTEROL FUMARATE DIHYDRATE 80; 4.5 UG/1; UG/1
80-4.5 AEROSOL RESPIRATORY (INHALATION)
Qty: 1 | Refills: 3 | Status: ACTIVE | COMMUNITY
Start: 2022-12-07 | End: 1900-01-01

## 2022-12-09 LAB
ALBUMIN SERPL ELPH-MCNC: 4.2 G/DL
ALP BLD-CCNC: 93 U/L
ALT SERPL-CCNC: 23 U/L
ANION GAP SERPL CALC-SCNC: 11 MMOL/L
AST SERPL-CCNC: 17 U/L
BASOPHILS # BLD AUTO: 0.06 K/UL
BASOPHILS NFR BLD AUTO: 0.7 %
BILIRUB SERPL-MCNC: 0.7 MG/DL
BUN SERPL-MCNC: 17 MG/DL
CALCIUM SERPL-MCNC: 9.6 MG/DL
CHLORIDE SERPL-SCNC: 101 MMOL/L
CHOLEST SERPL-MCNC: 168 MG/DL
CO2 SERPL-SCNC: 28 MMOL/L
CREAT SERPL-MCNC: 1.08 MG/DL
EGFR: 76 ML/MIN/1.73M2
EOSINOPHIL # BLD AUTO: 0.44 K/UL
EOSINOPHIL NFR BLD AUTO: 5.4 %
ESTIMATED AVERAGE GLUCOSE: 120 MG/DL
GLUCOSE SERPL-MCNC: 98 MG/DL
HBA1C MFR BLD HPLC: 5.8 %
HCT VFR BLD CALC: 44.6 %
HDLC SERPL-MCNC: 33 MG/DL
HGB BLD-MCNC: 15.2 G/DL
IMM GRANULOCYTES NFR BLD AUTO: 0.5 %
LDLC SERPL CALC-MCNC: 85 MG/DL
LYMPHOCYTES # BLD AUTO: 1.56 K/UL
LYMPHOCYTES NFR BLD AUTO: 19.2 %
MAN DIFF?: NORMAL
MCHC RBC-ENTMCNC: 31 PG
MCHC RBC-ENTMCNC: 34.1 GM/DL
MCV RBC AUTO: 90.8 FL
MONOCYTES # BLD AUTO: 0.54 K/UL
MONOCYTES NFR BLD AUTO: 6.7 %
NEUTROPHILS # BLD AUTO: 5.48 K/UL
NEUTROPHILS NFR BLD AUTO: 67.5 %
NONHDLC SERPL-MCNC: 135 MG/DL
PLATELET # BLD AUTO: 205 K/UL
POTASSIUM SERPL-SCNC: 4.2 MMOL/L
PROT SERPL-MCNC: 6.4 G/DL
RBC # BLD: 4.91 M/UL
RBC # FLD: 12.9 %
SODIUM SERPL-SCNC: 140 MMOL/L
T4 SERPL-MCNC: 6.9 UG/DL
TRIGL SERPL-MCNC: 250 MG/DL
TSH SERPL-ACNC: 1.87 UIU/ML
WBC # FLD AUTO: 8.12 K/UL

## 2022-12-23 ENCOUNTER — RX RENEWAL (OUTPATIENT)
Age: 67
End: 2022-12-23

## 2023-01-08 ENCOUNTER — RX RENEWAL (OUTPATIENT)
Age: 68
End: 2023-01-08

## 2023-02-03 ENCOUNTER — APPOINTMENT (OUTPATIENT)
Dept: ORTHOPEDIC SURGERY | Facility: CLINIC | Age: 68
End: 2023-02-03
Payer: COMMERCIAL

## 2023-02-03 DIAGNOSIS — M17.12 UNILATERAL PRIMARY OSTEOARTHRITIS, LEFT KNEE: ICD-10-CM

## 2023-02-03 PROCEDURE — 99213 OFFICE O/P EST LOW 20 MIN: CPT

## 2023-02-03 PROCEDURE — 99072 ADDL SUPL MATRL&STAF TM PHE: CPT

## 2023-02-03 NOTE — HISTORY OF PRESENT ILLNESS
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 He states his symptoms are improving but it still bothers him. He has been doing physical therapy which does seem to help. He main complaints is stiffness and pain after getting up from a sitting position. Patient has a history of right meniscus tear that improved with physical therapy without operative intervention. \par \par Hx: Heart Attacks x 2, HTN, ASA and Prasugrel for Stents x 9 stents HLD\par

## 2023-02-03 NOTE — CONSULT LETTER
[Dear  ___] : Dear  [unfilled], [FreeTextEntry1] : I had the pleasure of evaluating your patient in the office today for complaints of left knee pain secondary to osteoarthritis, chondrocalcinosis and possible meniscus tear. I have enclosed a copy of today's office notes for your charts and for your review.\par \par Sincerely, \par \par Jose Eduardo Kurtz M.D.\par Professor and \par Department of Orthopedic Surgery\par [facility] [place]\par

## 2023-02-03 NOTE — DISCUSSION/SUMMARY
[de-identified] : Pt is a pleasant 66 year old male with left knee pain secondary to osteoarthritis  and chondrocalcinosis. A lengthy discussion was held regarding the patients condition and treatment options including all risks, benefits, prognosis and outcomes of each were discussed in detail. It was discussed that his knee pain is likely multifactorial from mild osteoarthritis, chondrocalcinosis, and possible meniscus tear. Discussed pathophysiology of three conditions, including the fact that chondrocalcinosis decreased the pliability of the meniscus and predisposes to tear, further raising clinical suspicion. Non-operative treatment was advised conservative treatment to consist of pain control with Acetaminophen, ice 15-20 minutes 1-3x per day, knee sleeve, and continued home exercises with focus on low impact exercise and such as yoga, swimming, and biking to maintain healthy weight. Hyaluronic acid injections were also discussed but contraindicated in setting of chondrocalcinosis. TKA was discussed as last resort only after all conservative management has been exhausted. The patient will contact me if there are any concerns. Follow up will be prn. The patient expressed understanding and all questions were answered.\par

## 2023-02-03 NOTE — PHYSICAL EXAM
[LE] : Sensory: Intact in bilateral lower extremities [PT] : posterior tibial 2+ and symmetric bilaterally [de-identified] : Pt is a pleasant 66 year old male in NAD and AAOx3. 32 BMI. The pt has a mildly antalgic gait. Physical examination of both knees reveals normal contours, skin intact with no signs of infection, no erythema, no swelling, no effusion, no distal lymphedema or phlebitis, no patholaxity. ROM of the left knee reveals 0°-125°. Strength is 5/5 within this arc of motion. There is pain on palpation to the medial joint line. No lateral joint line TTP. Patient noted to have genu varum. There are no neurological deficits. [de-identified] : X-rays from 8/30/22 reviewed by me today: 4 views of the left knee demonstrate mild osteoarthritis with osteophyte formation, chondrocalcinosis of meniscus, possible Jonathan-Stieda lesion likely chronic, with no acute fracture or dislocation.\par

## 2023-02-03 NOTE — REVIEW OF SYSTEMS
[Joint Pain] : joint pain [Joint Stiffness] : joint stiffness [Muscle Weakness] : muscle weakness [Easy Bleeding] : a tendency for easy bleeding [Negative] : Constitutional [Joint Swelling] : no joint swelling [Feeling Tired] : no fatigue

## 2023-02-06 ENCOUNTER — RX RENEWAL (OUTPATIENT)
Age: 68
End: 2023-02-06

## 2023-02-06 RX ORDER — TIOTROPIUM BROMIDE 18 UG/1
18 CAPSULE ORAL; RESPIRATORY (INHALATION) DAILY
Qty: 90 | Refills: 1 | Status: ACTIVE | COMMUNITY
Start: 2022-12-07 | End: 1900-01-01

## 2023-05-16 ENCOUNTER — NON-APPOINTMENT (OUTPATIENT)
Age: 68
End: 2023-05-16

## 2023-05-24 ENCOUNTER — NON-APPOINTMENT (OUTPATIENT)
Age: 68
End: 2023-05-24

## 2023-05-26 ENCOUNTER — APPOINTMENT (OUTPATIENT)
Dept: VASCULAR SURGERY | Facility: CLINIC | Age: 68
End: 2023-05-26
Payer: COMMERCIAL

## 2023-05-26 ENCOUNTER — NON-APPOINTMENT (OUTPATIENT)
Age: 68
End: 2023-05-26

## 2023-05-26 VITALS
WEIGHT: 225 LBS | HEART RATE: 58 BPM | SYSTOLIC BLOOD PRESSURE: 144 MMHG | HEIGHT: 70 IN | BODY MASS INDEX: 32.21 KG/M2 | DIASTOLIC BLOOD PRESSURE: 79 MMHG | TEMPERATURE: 98 F

## 2023-05-26 PROCEDURE — 99203 OFFICE O/P NEW LOW 30 MIN: CPT

## 2023-05-26 PROCEDURE — 93880 EXTRACRANIAL BILAT STUDY: CPT

## 2023-05-31 ENCOUNTER — RX RENEWAL (OUTPATIENT)
Age: 68
End: 2023-05-31

## 2023-06-09 ENCOUNTER — NON-APPOINTMENT (OUTPATIENT)
Age: 68
End: 2023-06-09

## 2023-06-09 ENCOUNTER — APPOINTMENT (OUTPATIENT)
Dept: CARDIOLOGY | Facility: CLINIC | Age: 68
End: 2023-06-09
Payer: COMMERCIAL

## 2023-06-09 VITALS
SYSTOLIC BLOOD PRESSURE: 104 MMHG | HEART RATE: 53 BPM | BODY MASS INDEX: 32.28 KG/M2 | OXYGEN SATURATION: 95 % | DIASTOLIC BLOOD PRESSURE: 76 MMHG | WEIGHT: 225 LBS

## 2023-06-09 DIAGNOSIS — E66.9 OBESITY, UNSPECIFIED: ICD-10-CM

## 2023-06-09 DIAGNOSIS — E88.81 METABOLIC SYNDROME: ICD-10-CM

## 2023-06-09 DIAGNOSIS — M15.9 POLYOSTEOARTHRITIS, UNSPECIFIED: ICD-10-CM

## 2023-06-09 PROCEDURE — 36415 COLL VENOUS BLD VENIPUNCTURE: CPT

## 2023-06-09 PROCEDURE — 99214 OFFICE O/P EST MOD 30 MIN: CPT | Mod: 25

## 2023-06-09 PROCEDURE — 93000 ELECTROCARDIOGRAM COMPLETE: CPT

## 2023-06-12 LAB
ALBUMIN SERPL ELPH-MCNC: 4.3 G/DL
ALP BLD-CCNC: 93 U/L
ALT SERPL-CCNC: 27 U/L
ANION GAP SERPL CALC-SCNC: 14 MMOL/L
AST SERPL-CCNC: 21 U/L
BILIRUB SERPL-MCNC: 0.9 MG/DL
BUN SERPL-MCNC: 20 MG/DL
CALCIUM SERPL-MCNC: 9.7 MG/DL
CHLORIDE SERPL-SCNC: 102 MMOL/L
CHOLEST SERPL-MCNC: 184 MG/DL
CO2 SERPL-SCNC: 25 MMOL/L
CREAT SERPL-MCNC: 1.04 MG/DL
EGFR: 79 ML/MIN/1.73M2
ESTIMATED AVERAGE GLUCOSE: 114 MG/DL
GLUCOSE SERPL-MCNC: 102 MG/DL
HBA1C MFR BLD HPLC: 5.6 %
HDLC SERPL-MCNC: 34 MG/DL
LDLC SERPL CALC-MCNC: 90 MG/DL
NONHDLC SERPL-MCNC: 150 MG/DL
POTASSIUM SERPL-SCNC: 4.1 MMOL/L
PROT SERPL-MCNC: 6.9 G/DL
PSA FREE FLD-MCNC: 19 %
PSA FREE SERPL-MCNC: 0.25 NG/ML
PSA SERPL-MCNC: 1.31 NG/ML
SODIUM SERPL-SCNC: 140 MMOL/L
T4 SERPL-MCNC: 8.1 UG/DL
TRIGL SERPL-MCNC: 301 MG/DL
TSH SERPL-ACNC: 1.93 UIU/ML

## 2023-06-16 ENCOUNTER — APPOINTMENT (OUTPATIENT)
Dept: MRI IMAGING | Facility: CLINIC | Age: 68
End: 2023-06-16
Payer: COMMERCIAL

## 2023-06-16 ENCOUNTER — RESULT REVIEW (OUTPATIENT)
Age: 68
End: 2023-06-16

## 2023-06-16 ENCOUNTER — OUTPATIENT (OUTPATIENT)
Dept: OUTPATIENT SERVICES | Facility: HOSPITAL | Age: 68
LOS: 1 days | End: 2023-06-16
Payer: COMMERCIAL

## 2023-06-16 DIAGNOSIS — Z95.5 PRESENCE OF CORONARY ANGIOPLASTY IMPLANT AND GRAFT: Chronic | ICD-10-CM

## 2023-06-16 DIAGNOSIS — C43.9 MALIGNANT MELANOMA OF SKIN, UNSPECIFIED: Chronic | ICD-10-CM

## 2023-06-16 DIAGNOSIS — Z00.8 ENCOUNTER FOR OTHER GENERAL EXAMINATION: ICD-10-CM

## 2023-06-16 PROCEDURE — 70546 MR ANGIOGRAPH HEAD W/O&W/DYE: CPT

## 2023-06-16 PROCEDURE — 70549 MR ANGIOGRAPH NECK W/O&W/DYE: CPT | Mod: 26

## 2023-06-16 PROCEDURE — 70546 MR ANGIOGRAPH HEAD W/O&W/DYE: CPT | Mod: 26

## 2023-06-16 PROCEDURE — 70549 MR ANGIOGRAPH NECK W/O&W/DYE: CPT

## 2023-06-16 PROCEDURE — A9585: CPT

## 2023-06-20 ENCOUNTER — APPOINTMENT (OUTPATIENT)
Dept: VASCULAR SURGERY | Facility: CLINIC | Age: 68
End: 2023-06-20
Payer: COMMERCIAL

## 2023-06-20 PROCEDURE — 99443: CPT

## 2023-07-12 ENCOUNTER — INPATIENT (INPATIENT)
Facility: HOSPITAL | Age: 68
LOS: 0 days | Discharge: HOME | DRG: 310 | End: 2023-07-13
Attending: INTERNAL MEDICINE | Admitting: INTERNAL MEDICINE
Payer: COMMERCIAL

## 2023-07-12 VITALS
SYSTOLIC BLOOD PRESSURE: 138 MMHG | OXYGEN SATURATION: 95 % | WEIGHT: 225.09 LBS | RESPIRATION RATE: 19 BRPM | TEMPERATURE: 98 F | DIASTOLIC BLOOD PRESSURE: 108 MMHG | HEART RATE: 134 BPM | HEIGHT: 70 IN

## 2023-07-12 DIAGNOSIS — I48.91 UNSPECIFIED ATRIAL FIBRILLATION: ICD-10-CM

## 2023-07-12 DIAGNOSIS — C43.9 MALIGNANT MELANOMA OF SKIN, UNSPECIFIED: Chronic | ICD-10-CM

## 2023-07-12 DIAGNOSIS — Z95.5 PRESENCE OF CORONARY ANGIOPLASTY IMPLANT AND GRAFT: Chronic | ICD-10-CM

## 2023-07-12 LAB
ALBUMIN SERPL ELPH-MCNC: 3.9 G/DL — SIGNIFICANT CHANGE UP (ref 3.3–5)
ALP SERPL-CCNC: 85 U/L — SIGNIFICANT CHANGE UP (ref 40–120)
ALT FLD-CCNC: 39 U/L — SIGNIFICANT CHANGE UP (ref 10–45)
ANION GAP SERPL CALC-SCNC: 12 MMOL/L — SIGNIFICANT CHANGE UP (ref 5–17)
AST SERPL-CCNC: 22 U/L — SIGNIFICANT CHANGE UP (ref 10–40)
BASE EXCESS BLDV CALC-SCNC: -0.7 MMOL/L — SIGNIFICANT CHANGE UP (ref -2–3)
BASOPHILS # BLD AUTO: 0 K/UL — SIGNIFICANT CHANGE UP (ref 0–0.2)
BASOPHILS NFR BLD AUTO: 0 % — SIGNIFICANT CHANGE UP (ref 0–2)
BILIRUB SERPL-MCNC: 0.7 MG/DL — SIGNIFICANT CHANGE UP (ref 0.2–1.2)
BUN SERPL-MCNC: 18 MG/DL — SIGNIFICANT CHANGE UP (ref 7–23)
BURR CELLS BLD QL SMEAR: PRESENT — SIGNIFICANT CHANGE UP
CA-I SERPL-SCNC: 1.22 MMOL/L — SIGNIFICANT CHANGE UP (ref 1.15–1.33)
CALCIUM SERPL-MCNC: 9.1 MG/DL — SIGNIFICANT CHANGE UP (ref 8.4–10.5)
CHLORIDE BLDV-SCNC: 107 MMOL/L — SIGNIFICANT CHANGE UP (ref 96–108)
CHLORIDE SERPL-SCNC: 109 MMOL/L — HIGH (ref 96–108)
CO2 BLDV-SCNC: 25 MMOL/L — SIGNIFICANT CHANGE UP (ref 22–26)
CO2 SERPL-SCNC: 22 MMOL/L — SIGNIFICANT CHANGE UP (ref 22–31)
CREAT SERPL-MCNC: 1.01 MG/DL — SIGNIFICANT CHANGE UP (ref 0.5–1.3)
EGFR: 82 ML/MIN/1.73M2 — SIGNIFICANT CHANGE UP
EOSINOPHIL # BLD AUTO: 0.33 K/UL — SIGNIFICANT CHANGE UP (ref 0–0.5)
EOSINOPHIL NFR BLD AUTO: 5 % — SIGNIFICANT CHANGE UP (ref 0–6)
GAS PNL BLDV: 137 MMOL/L — SIGNIFICANT CHANGE UP (ref 136–145)
GAS PNL BLDV: SIGNIFICANT CHANGE UP
GAS PNL BLDV: SIGNIFICANT CHANGE UP
GLUCOSE BLDV-MCNC: 104 MG/DL — HIGH (ref 70–99)
GLUCOSE SERPL-MCNC: 112 MG/DL — HIGH (ref 70–99)
HCO3 BLDV-SCNC: 24 MMOL/L — SIGNIFICANT CHANGE UP (ref 22–29)
HCT VFR BLD CALC: 43.9 % — SIGNIFICANT CHANGE UP (ref 39–50)
HCT VFR BLDA CALC: 46 % — SIGNIFICANT CHANGE UP (ref 39–51)
HGB BLD CALC-MCNC: 15.4 G/DL — SIGNIFICANT CHANGE UP (ref 12.6–17.4)
HGB BLD-MCNC: 15 G/DL — SIGNIFICANT CHANGE UP (ref 13–17)
LACTATE BLDV-MCNC: 1.5 MMOL/L — SIGNIFICANT CHANGE UP (ref 0.5–2)
LYMPHOCYTES # BLD AUTO: 0.94 K/UL — LOW (ref 1–3.3)
LYMPHOCYTES # BLD AUTO: 14 % — SIGNIFICANT CHANGE UP (ref 13–44)
MAGNESIUM SERPL-MCNC: 1.8 MG/DL — SIGNIFICANT CHANGE UP (ref 1.6–2.6)
MANUAL SMEAR VERIFICATION: SIGNIFICANT CHANGE UP
MCHC RBC-ENTMCNC: 30.2 PG — SIGNIFICANT CHANGE UP (ref 27–34)
MCHC RBC-ENTMCNC: 34.2 GM/DL — SIGNIFICANT CHANGE UP (ref 32–36)
MCV RBC AUTO: 88.3 FL — SIGNIFICANT CHANGE UP (ref 80–100)
MONOCYTES # BLD AUTO: 0.61 K/UL — SIGNIFICANT CHANGE UP (ref 0–0.9)
MONOCYTES NFR BLD AUTO: 9.1 % — SIGNIFICANT CHANGE UP (ref 2–14)
NEUTROPHILS # BLD AUTO: 4.8 K/UL — SIGNIFICANT CHANGE UP (ref 1.8–7.4)
NEUTROPHILS NFR BLD AUTO: 71.9 % — SIGNIFICANT CHANGE UP (ref 43–77)
NT-PROBNP SERPL-SCNC: 1718 PG/ML — HIGH (ref 0–300)
PCO2 BLDV: 40 MMHG — LOW (ref 42–55)
PH BLDV: 7.39 — SIGNIFICANT CHANGE UP (ref 7.32–7.43)
PLAT MORPH BLD: NORMAL — SIGNIFICANT CHANGE UP
PLATELET # BLD AUTO: 183 K/UL — SIGNIFICANT CHANGE UP (ref 150–400)
PO2 BLDV: 66 MMHG — HIGH (ref 25–45)
POIKILOCYTOSIS BLD QL AUTO: SLIGHT — SIGNIFICANT CHANGE UP
POLYCHROMASIA BLD QL SMEAR: SLIGHT — SIGNIFICANT CHANGE UP
POTASSIUM BLDV-SCNC: 3.8 MMOL/L — SIGNIFICANT CHANGE UP (ref 3.5–5.1)
POTASSIUM SERPL-MCNC: 3.9 MMOL/L — SIGNIFICANT CHANGE UP (ref 3.5–5.3)
POTASSIUM SERPL-SCNC: 3.9 MMOL/L — SIGNIFICANT CHANGE UP (ref 3.5–5.3)
PROT SERPL-MCNC: 6.7 G/DL — SIGNIFICANT CHANGE UP (ref 6–8.3)
RBC # BLD: 4.97 M/UL — SIGNIFICANT CHANGE UP (ref 4.2–5.8)
RBC # FLD: 13.6 % — SIGNIFICANT CHANGE UP (ref 10.3–14.5)
RBC BLD AUTO: ABNORMAL
SAO2 % BLDV: 94 % — HIGH (ref 67–88)
SODIUM SERPL-SCNC: 143 MMOL/L — SIGNIFICANT CHANGE UP (ref 135–145)
TROPONIN T, HIGH SENSITIVITY RESULT: 20 NG/L — SIGNIFICANT CHANGE UP (ref 0–51)
TROPONIN T, HIGH SENSITIVITY RESULT: 21 NG/L — SIGNIFICANT CHANGE UP (ref 0–51)
WBC # BLD: 6.68 K/UL — SIGNIFICANT CHANGE UP (ref 3.8–10.5)
WBC # FLD AUTO: 6.68 K/UL — SIGNIFICANT CHANGE UP (ref 3.8–10.5)

## 2023-07-12 PROCEDURE — 71045 X-RAY EXAM CHEST 1 VIEW: CPT | Mod: 26

## 2023-07-12 PROCEDURE — 99291 CRITICAL CARE FIRST HOUR: CPT

## 2023-07-12 PROCEDURE — 93306 TTE W/DOPPLER COMPLETE: CPT | Mod: 26

## 2023-07-12 RX ORDER — VALSARTAN 80 MG/1
320 TABLET ORAL DAILY
Refills: 0 | Status: DISCONTINUED | OUTPATIENT
Start: 2023-07-12 | End: 2023-07-13

## 2023-07-12 RX ORDER — ASPIRIN/CALCIUM CARB/MAGNESIUM 324 MG
162 TABLET ORAL ONCE
Refills: 0 | Status: COMPLETED | OUTPATIENT
Start: 2023-07-12 | End: 2023-07-12

## 2023-07-12 RX ORDER — FINASTERIDE 5 MG/1
5 TABLET, FILM COATED ORAL DAILY
Refills: 0 | Status: DISCONTINUED | OUTPATIENT
Start: 2023-07-12 | End: 2023-07-13

## 2023-07-12 RX ORDER — METOPROLOL TARTRATE 50 MG
25 TABLET ORAL
Refills: 0 | Status: DISCONTINUED | OUTPATIENT
Start: 2023-07-12 | End: 2023-07-13

## 2023-07-12 RX ORDER — ENOXAPARIN SODIUM 100 MG/ML
100 INJECTION SUBCUTANEOUS EVERY 12 HOURS
Refills: 0 | Status: DISCONTINUED | OUTPATIENT
Start: 2023-07-12 | End: 2023-07-13

## 2023-07-12 RX ORDER — ATORVASTATIN CALCIUM 80 MG/1
40 TABLET, FILM COATED ORAL AT BEDTIME
Refills: 0 | Status: DISCONTINUED | OUTPATIENT
Start: 2023-07-12 | End: 2023-07-13

## 2023-07-12 RX ORDER — METOPROLOL TARTRATE 50 MG
5 TABLET ORAL ONCE
Refills: 0 | Status: COMPLETED | OUTPATIENT
Start: 2023-07-12 | End: 2023-07-12

## 2023-07-12 RX ORDER — ROSUVASTATIN CALCIUM 5 MG/1
1 TABLET ORAL
Qty: 0 | Refills: 0 | DISCHARGE

## 2023-07-12 RX ORDER — ASPIRIN/CALCIUM CARB/MAGNESIUM 324 MG
81 TABLET ORAL DAILY
Refills: 0 | Status: DISCONTINUED | OUTPATIENT
Start: 2023-07-12 | End: 2023-07-13

## 2023-07-12 RX ORDER — ISOSORBIDE MONONITRATE 60 MG/1
60 TABLET, EXTENDED RELEASE ORAL DAILY
Refills: 0 | Status: DISCONTINUED | OUTPATIENT
Start: 2023-07-12 | End: 2023-07-13

## 2023-07-12 RX ORDER — PRASUGREL 5 MG/1
10 TABLET, FILM COATED ORAL DAILY
Refills: 0 | Status: DISCONTINUED | OUTPATIENT
Start: 2023-07-12 | End: 2023-07-13

## 2023-07-12 RX ADMIN — Medication 5 MILLIGRAM(S): at 07:16

## 2023-07-12 RX ADMIN — Medication 25 MILLIGRAM(S): at 20:36

## 2023-07-12 RX ADMIN — Medication 162 MILLIGRAM(S): at 07:16

## 2023-07-12 NOTE — ED PROVIDER NOTE - OBJECTIVE STATEMENT
67 y.o. M A&Ox4 PMHx of MI, Stents x 10, a fib, presenting to ED via walk-in triage for chest tightness. Pt states that he was woken up by chest tightness around 130 am, took his dose of bisoprolol and 2 Nitros, went back to bed. States he woke up again a few hours later with same complaints. States that usually the afib will pass within a few minutes. States that he took his BP when he woke up and it was 140s/110s. States that he has been having CRUZ for about 2 days. Pt denies fever/chills, H/A, lightheadedness/dizziness, vision changes, SOB, chest pain, abd pain, N/V/D, constipation, dysuria, hematuria, hematochezia, weakness, numbness, and tingling.

## 2023-07-12 NOTE — ED PROVIDER NOTE - CLINICAL SUMMARY MEDICAL DECISION MAKING FREE TEXT BOX
Patient with CAD, A-fib, presents with A-fib to the 120s.  On exam other than the tachycardia he is well-appearing and without any other vital sign abnormalities.  Patient will get a work-up to assess for any acute metabolic, infectious, ischemic etiology that could be contributing to his A-fib, will get rate control as needed to get his heart rate under 120, and will likely require admission given his multiple comorbidities and need for a more detailed cardiac work-up given aphasia was controlled.

## 2023-07-12 NOTE — H&P ADULT - HISTORY OF PRESENT ILLNESS
68 yo male h/o cad s/p pci x7, mi, htn, chol, paroxsymal afib, p/w chest pain and palpitations.  pt felt palpitations in middle of night. took bisopolol at home  in ER pt with afib with rvr  given metoprolol iv  converted to nsr 60s  chest pain described as pressure like. lasted approx 15-30 min  currently cp free  no n/v/d  no fevers/chills  no recent travel or sick contacts 68 yo male h/o cad s/p pci x7, mi, htn, chol, paroxsymal afib not on AC at home, p/w chest pain and palpitations.  pt felt palpitations in middle of night. took bisopolol at home  in ER pt with afib with rvr  given metoprolol iv  converted to nsr 60s  chest pain described as pressure like. lasted approx 15-30 min  currently cp free  no n/v/d  no fevers/chills  no recent travel or sick contacts

## 2023-07-12 NOTE — ED PROVIDER NOTE - NS ED MD DISPO SPECIAL CONSIDERATION1
None Airway patent, TM normal bilaterally, normal appearing mouth, nose, throat, neck supple with full range of motion, no cervical adenopathy.

## 2023-07-12 NOTE — H&P ADULT - NSVTERISKREFERASSESS_GEN_ALL_CORE
[facility] PROFESSIONAL SERVICES  [facility] OF [address]   [address] [address]   Dept: [phone]   Dept Fax: [phone]   Loc: [phone]      Chief Complaint   Patient presents with   Mannya Sings     altered mental status    Hypertension     Patient with a history of recent hospitalization for metabolic encephalopathy was found to have cardiomyopathy. Patient states he currently is in an extended care facility and continuing to rehab from his recent hospitalization. He states overall has been feeling well. He denies any chest pain, shortness of breath or palpitations. He obviously has not been able to drink any alcohol or consume any cocaine while he's been in the nursing home. He continues to smoke intermittently. Cardiologist:  Dr. Ventura Bran:   No fever, no chills, No fatigue or weight loss  Pulmonary:    No dyspnea, no wheezing  Cardiac:    Denies recent chest pain   GI:     No nausea or vomiting, no abdominal pain  Neuro:    No dizziness or light headedness  Musculoskeletal:  No recent active issues  Extremities:   No edema, good peripheral pulses      Past Medical History:   Diagnosis Date    Asthma     Cerebral artery occlusion with [facility] infarction ([facility]     Chronic kidney disease     Diarrhea     [facility]     previous    Hypertension        Allergies   Allergen Reactions    Sulfa Antibiotics Anaphylaxis       Current Outpatient Prescriptions   Medication Sig Dispense Refill    loperamide (IMODIUM A-D) 2 MG tablet Take 2 mg by mouth 4 times daily as needed for Diarrhea      polyethylene glycol (MIRALAX) POWD powder Take 17 g by mouth daily      acetaminophen (TYLENOL) 325 MG tablet Take 650 mg by mouth every 6 hours as needed for Pain      LORazepam (ATIVAN) 1 MG tablet Take 1 mg by mouth every 4 hours as needed for Anxiety. [facility] aspirin 81 MG chewable tablet Take 1 tablet by mouth daily 30 tablet 3   
Pt here for check up. Pt denies Cp, dizziness/lightheadedness, sob, palpitations, PAM.     Pt c/o
Refer to the Assessment tab to view/cancel completed assessment.

## 2023-07-12 NOTE — ED ADULT NURSE NOTE - NS ED NURSE LEVEL OF CONSCIOUSNESS SPEECH
Have You Had A Facial Before?: has not had a previous facial
When Outside In The Sun, Do You...: mostly tans, rarely burns
Speaking Coherently

## 2023-07-12 NOTE — ED ADULT NURSE NOTE - OBJECTIVE STATEMENT
67 y.o. M A&Ox4 PMHx of MI, Stents x 10, a fib, presenting to ED via walk-in triage for chest tightness. Pt states that he was woken up by chest tightness around 130 am, took a beta blocker and 2 Nitros, went back to bed. States he woke up again a few hours later with same complaints. States that usually the afib will pass within a few minutes. States that he took his BP when he woke up and it was 140s/110s. States that he has been having CRUZ for about 2 days. Pt denies fever/chills, H/A, lightheadedness/dizziness, vision changes, SOB, chest pain, abd pain, N/V/D, constipation, dysuria, hematuria, hematochezia, weakness, numbness, and tingling. Upon assessment, pt alert, grossly neurologically intact, and well appearing. Pupils PERRLA and no drainage noted. Airway patent, chest rise symmetrical with no advantageous L/S, and pt is eupneic. Skin is warm, dry, and normal for race. No cyanosis noted to lips or fingernails. Mucous membranes moist. Negative clubbed fingernails. Radial pulses equal and +2 bilaterally. Abd soft, nontender, nondistended. ROM intact and strength +5 in all four extremities. No edema noted to bilateral legs or arms. Placed on CM AFib in the 120s. Pt resting in stretcher in position of comfort. Stretcher locked and lowered and call bell within reach.

## 2023-07-12 NOTE — ED PROVIDER NOTE - PHYSICAL EXAMINATION
GEN: Well Appearing, Nontoxic, NAD  HEENT: NC/AT, PERRL, EOMI, MMM  Neck: supple, ROM intact, no midline tenderness  CV: irregularly irregular around 120s  RESP: CTAB w/o w/r/r  ABD: Soft, nt/nd. No guarding/rebound. no CVAT  EXT/MSK: No lower extremity edema or calf tenderness. FROMx4  SKIN: No erythema, lesions or rash. well perfused, intact cap refill  Neuro: Grossly intact, AOX3 with normal speech. no focal neuro deficits.

## 2023-07-12 NOTE — H&P ADULT - NSICDXPASTMEDICALHX_GEN_ALL_CORE_FT
PAST MEDICAL HISTORY:  Ankle fracture     Asthma     Atrial fibrillation On Xarelto    H/O heart artery stent 2008, 2012    HTN (hypertension)     Hypercholesteremia     Melanoma     MI (myocardial infarction) 2008

## 2023-07-12 NOTE — ED PROVIDER NOTE - PROGRESS NOTE DETAILS
Received signout pending labs.  First troponin 20.  Labs otherwise unremarkable.  Chest x-ray clear.  Heart rate 90s to 130s A-fib on telemetry.  BP stable.  Given significant coronary disease and persistent A-fib will admit to medicine for further work-up. -Binu Dooley PA-C

## 2023-07-12 NOTE — ED ADULT TRIAGE NOTE - CHIEF COMPLAINT QUOTE
palpitations and chest pain r/t BL arms woke him up from sleep x last night. - took 2 tabs of nitro w no relief.   Endorses shortness of breath.   Hx 9 stents, afib.

## 2023-07-12 NOTE — H&P ADULT - NSHPPHYSICALEXAM_GEN_ALL_CORE
Vital Signs Last 24 Hrs  T(C): 36.6 (12 Jul 2023 11:32), Max: 36.6 (12 Jul 2023 11:32)  T(F): 97.9 (12 Jul 2023 11:32), Max: 97.9 (12 Jul 2023 11:32)  HR: 58 (12 Jul 2023 11:32) (58 - 134)  BP: 128/83 (12 Jul 2023 11:32) (114/93 - 138/108)  BP(mean): --  RR: 18 (12 Jul 2023 11:32) (18 - 19)  SpO2: 98% (12 Jul 2023 11:32) (95% - 98%)    Parameters below as of 12 Jul 2023 11:32  Patient On (Oxygen Delivery Method): room air        PHYSICAL EXAM:  GENERAL: NAD, well-developed  HEAD:  Atraumatic, Normocephalic  EYES: EOMI, PERRLA, conjunctiva and sclera clear  NECK: Supple, No JVD  CHEST/LUNG: Clear to auscultation bilaterally; No wheeze  HEART: Regular rate and rhythm; No murmurs, rubs, or gallops  ABDOMEN: Soft, Nontender, Nondistended; Bowel sounds present  EXTREMITIES:  2+ Peripheral Pulses, No clubbing, cyanosis, or edema  PSYCH: AAOx3  NEUROLOGY: non-focal  SKIN: No rashes or lesions

## 2023-07-12 NOTE — H&P ADULT - ASSESSMENT
68 yo male h/o cad s/p pci x7, mi, htn, chol, paroxsymal afib, p/w chest pain and palpitations.    paroxsymal afib with rvr  cards consult f/u  tele  echo  rate control with metoprolol  AC with xarelto    chest pain  likely 2/2 rapid afib  acs ruled out with trop neg x2  f/u echo  cards f/u    cad s/p pci  cont asa/effient 68 yo male h/o cad s/p pci x7, mi, htn, chol, paroxsymal afib, p/w chest pain and palpitations.    paroxsymal afib with rvr  cards consult f/u  tele monitor  check echo  rate control with metoprolol  AC with Lovenox bid for now    chest pain  likely 2/2 rapid afib  acs ruled out with trop neg x2  f/u echo  cards f/u    cad s/p pci  cont asa/effient    cont other home meds      Advanced care planning was discussed with patient and family.  Advanced care planning forms were reviewed and discussed as appropriate.  Differential diagnosis and plan of care discussed with patient after the evaluation.   Pain assessed and judicious use of narcotics when appropriate was discussed.  Importance of Fall prevention discussed.  Counseling on Smoking and Alcohol cessation was offered when appropriate.  Counseling on Diet, exercise, and medication compliance was done.       Approx 60 minutes spent. 68 yo male h/o cad s/p pci x7, mi, htn, chol, paroxsymal afib, p/w chest pain and palpitations.    paroxsymal afib with rvr  cards consult f/u  tele monitor  check echo  on bisoprolol at home (non formulary here)  start metoprolol 25 bid and uptitrate as needed  AC with Lovenox bid for now    chest pain  likely 2/2 rapid afib  acs ruled out with trop neg x2  f/u echo  cards f/u    cad s/p pci  cont asa/effient    htn  cont home meds    chol  cont statin    cont other home meds      Advanced care planning was discussed with patient and family.  Advanced care planning forms were reviewed and discussed as appropriate.  Differential diagnosis and plan of care discussed with patient after the evaluation.   Pain assessed and judicious use of narcotics when appropriate was discussed.  Importance of Fall prevention discussed.  Counseling on Smoking and Alcohol cessation was offered when appropriate.  Counseling on Diet, exercise, and medication compliance was done.       Approx 60 minutes spent.

## 2023-07-13 ENCOUNTER — TRANSCRIPTION ENCOUNTER (OUTPATIENT)
Age: 68
End: 2023-07-13

## 2023-07-13 VITALS — HEART RATE: 56 BPM | OXYGEN SATURATION: 94 % | TEMPERATURE: 98 F | RESPIRATION RATE: 18 BRPM

## 2023-07-13 LAB
ALBUMIN SERPL ELPH-MCNC: 3.6 G/DL — SIGNIFICANT CHANGE UP (ref 3.3–5)
ALP SERPL-CCNC: 75 U/L — SIGNIFICANT CHANGE UP (ref 40–120)
ALT FLD-CCNC: 35 U/L — SIGNIFICANT CHANGE UP (ref 10–45)
ANION GAP SERPL CALC-SCNC: 13 MMOL/L — SIGNIFICANT CHANGE UP (ref 5–17)
AST SERPL-CCNC: 21 U/L — SIGNIFICANT CHANGE UP (ref 10–40)
BILIRUB SERPL-MCNC: 0.7 MG/DL — SIGNIFICANT CHANGE UP (ref 0.2–1.2)
BUN SERPL-MCNC: 19 MG/DL — SIGNIFICANT CHANGE UP (ref 7–23)
CALCIUM SERPL-MCNC: 9 MG/DL — SIGNIFICANT CHANGE UP (ref 8.4–10.5)
CHLORIDE SERPL-SCNC: 106 MMOL/L — SIGNIFICANT CHANGE UP (ref 96–108)
CO2 SERPL-SCNC: 23 MMOL/L — SIGNIFICANT CHANGE UP (ref 22–31)
CREAT SERPL-MCNC: 1.09 MG/DL — SIGNIFICANT CHANGE UP (ref 0.5–1.3)
EGFR: 75 ML/MIN/1.73M2 — SIGNIFICANT CHANGE UP
GLUCOSE SERPL-MCNC: 93 MG/DL — SIGNIFICANT CHANGE UP (ref 70–99)
HCT VFR BLD CALC: 42.1 % — SIGNIFICANT CHANGE UP (ref 39–50)
HGB BLD-MCNC: 14.3 G/DL — SIGNIFICANT CHANGE UP (ref 13–17)
MCHC RBC-ENTMCNC: 30.4 PG — SIGNIFICANT CHANGE UP (ref 27–34)
MCHC RBC-ENTMCNC: 34 GM/DL — SIGNIFICANT CHANGE UP (ref 32–36)
MCV RBC AUTO: 89.4 FL — SIGNIFICANT CHANGE UP (ref 80–100)
NRBC # BLD: 0 /100 WBCS — SIGNIFICANT CHANGE UP (ref 0–0)
PLATELET # BLD AUTO: 165 K/UL — SIGNIFICANT CHANGE UP (ref 150–400)
POTASSIUM SERPL-MCNC: 3.8 MMOL/L — SIGNIFICANT CHANGE UP (ref 3.5–5.3)
POTASSIUM SERPL-SCNC: 3.8 MMOL/L — SIGNIFICANT CHANGE UP (ref 3.5–5.3)
PROT SERPL-MCNC: 5.9 G/DL — LOW (ref 6–8.3)
RBC # BLD: 4.71 M/UL — SIGNIFICANT CHANGE UP (ref 4.2–5.8)
RBC # FLD: 13.4 % — SIGNIFICANT CHANGE UP (ref 10.3–14.5)
SODIUM SERPL-SCNC: 142 MMOL/L — SIGNIFICANT CHANGE UP (ref 135–145)
TSH SERPL-MCNC: 1.58 UIU/ML — SIGNIFICANT CHANGE UP (ref 0.27–4.2)
WBC # BLD: 7.22 K/UL — SIGNIFICANT CHANGE UP (ref 3.8–10.5)
WBC # FLD AUTO: 7.22 K/UL — SIGNIFICANT CHANGE UP (ref 3.8–10.5)

## 2023-07-13 PROCEDURE — 82330 ASSAY OF CALCIUM: CPT

## 2023-07-13 PROCEDURE — 99285 EMERGENCY DEPT VISIT HI MDM: CPT | Mod: 25

## 2023-07-13 PROCEDURE — 99223 1ST HOSP IP/OBS HIGH 75: CPT

## 2023-07-13 PROCEDURE — 85027 COMPLETE CBC AUTOMATED: CPT

## 2023-07-13 PROCEDURE — 84484 ASSAY OF TROPONIN QUANT: CPT

## 2023-07-13 PROCEDURE — 80053 COMPREHEN METABOLIC PANEL: CPT

## 2023-07-13 PROCEDURE — 85025 COMPLETE CBC W/AUTO DIFF WBC: CPT

## 2023-07-13 PROCEDURE — 82947 ASSAY GLUCOSE BLOOD QUANT: CPT

## 2023-07-13 PROCEDURE — 83605 ASSAY OF LACTIC ACID: CPT

## 2023-07-13 PROCEDURE — 82803 BLOOD GASES ANY COMBINATION: CPT

## 2023-07-13 PROCEDURE — 85018 HEMOGLOBIN: CPT

## 2023-07-13 PROCEDURE — 71045 X-RAY EXAM CHEST 1 VIEW: CPT

## 2023-07-13 PROCEDURE — 93306 TTE W/DOPPLER COMPLETE: CPT

## 2023-07-13 PROCEDURE — 84443 ASSAY THYROID STIM HORMONE: CPT

## 2023-07-13 PROCEDURE — 84132 ASSAY OF SERUM POTASSIUM: CPT

## 2023-07-13 PROCEDURE — 84295 ASSAY OF SERUM SODIUM: CPT

## 2023-07-13 PROCEDURE — 36415 COLL VENOUS BLD VENIPUNCTURE: CPT

## 2023-07-13 PROCEDURE — 83735 ASSAY OF MAGNESIUM: CPT

## 2023-07-13 PROCEDURE — 83880 ASSAY OF NATRIURETIC PEPTIDE: CPT

## 2023-07-13 PROCEDURE — 96374 THER/PROPH/DIAG INJ IV PUSH: CPT

## 2023-07-13 PROCEDURE — 82435 ASSAY OF BLOOD CHLORIDE: CPT

## 2023-07-13 PROCEDURE — 85014 HEMATOCRIT: CPT

## 2023-07-13 RX ORDER — METOPROLOL TARTRATE 50 MG
50 TABLET ORAL DAILY
Refills: 0 | Status: DISCONTINUED | OUTPATIENT
Start: 2023-07-13 | End: 2023-07-13

## 2023-07-13 RX ORDER — APIXABAN 2.5 MG/1
1 TABLET, FILM COATED ORAL
Qty: 60 | Refills: 0
Start: 2023-07-13 | End: 2023-08-11

## 2023-07-13 RX ORDER — METOPROLOL TARTRATE 50 MG
1 TABLET ORAL
Qty: 30 | Refills: 0
Start: 2023-07-13 | End: 2023-08-11

## 2023-07-13 RX ORDER — APIXABAN 2.5 MG/1
5 TABLET, FILM COATED ORAL EVERY 12 HOURS
Refills: 0 | Status: DISCONTINUED | OUTPATIENT
Start: 2023-07-13 | End: 2023-07-13

## 2023-07-13 RX ADMIN — VALSARTAN 320 MILLIGRAM(S): 80 TABLET ORAL at 05:45

## 2023-07-13 RX ADMIN — FINASTERIDE 5 MILLIGRAM(S): 5 TABLET, FILM COATED ORAL at 08:32

## 2023-07-13 RX ADMIN — ISOSORBIDE MONONITRATE 60 MILLIGRAM(S): 60 TABLET, EXTENDED RELEASE ORAL at 08:32

## 2023-07-13 RX ADMIN — Medication 81 MILLIGRAM(S): at 08:32

## 2023-07-13 RX ADMIN — ENOXAPARIN SODIUM 100 MILLIGRAM(S): 100 INJECTION SUBCUTANEOUS at 05:45

## 2023-07-13 RX ADMIN — Medication 25 MILLIGRAM(S): at 05:45

## 2023-07-13 RX ADMIN — PRASUGREL 10 MILLIGRAM(S): 5 TABLET, FILM COATED ORAL at 08:32

## 2023-07-13 NOTE — DISCHARGE NOTE PROVIDER - CARE PROVIDER_API CALL
Anish Colón  Cardiovascular Disease  [address], [address]  [place] [address]  Phone: [phone]  Fax: [phone]  Established Patient  Follow Up Time: 1 week

## 2023-07-13 NOTE — DISCHARGE NOTE PROVIDER - NSDCCPCAREPLAN_GEN_ALL_CORE_FT
PRINCIPAL DISCHARGE DIAGNOSIS  Diagnosis: Atrial fibrillation  Assessment and Plan of Treatment: Atrial fibrillation is a common heart rhythm problem which increases the risk of stroke and heat attack.  It helps if you control your blood pressure, avoid alcohol, cut down on caffeine, get treatment for your thyroid if it is overactive, and perform moderate exercise in consultation with your Primary Care Provider.  Call your doctor if you experience chest tightness/pain, lightheadedness, loss of consciousness, shortness of breath (especially with exercise), feel your heart racing or beating unusually, frequent or abnormal bleeding.  It is important to take all your heart medications as prescribed.  Some cases of Afib involve atrial fibrillation with rapid ventricular response (RVR). This is when the rapid contractions of the atria make the ventricles beat too quickly. If the ventricles beat too fast, they can’t receive enough blood. So they can’t meet the body’s need for oxygenated blood. This can lead to heart failure.  You have been put on 2 NEW medications:  - Eliquis 5mg by mouth Twice daily (this is a blood thinner)  - Toprol (Metoprolol) 50mg by mouth, daily (this is a beta blocker that helps with keeping heart rate down)  Please STOP: Bisoprolol  Please FOLLOW UP with Dr. Colón, your cardiologist.        SECONDARY DISCHARGE DIAGNOSES  Diagnosis: Chest pain  Assessment and Plan of Treatment: Call your doctor if you have any new pain, pressure, or discomfort in the center of your chest, pain, tingling or discomfort in arms, back, neck, jaw, or stomach, shortness of breath, nausea, vomiting, burping or heartburn, sweating, cold and clammy skin, racing or abnormal heartbeat for more than 10 minutes or if they keep coming & going.  Call 911 and do not try to get to hospital by car.       PRINCIPAL DISCHARGE DIAGNOSIS  Diagnosis: Atrial fibrillation  Assessment and Plan of Treatment: Atrial fibrillation is a common heart rhythm problem which increases the risk of stroke and heat attack.  It helps if you control your blood pressure, avoid alcohol, cut down on caffeine, get treatment for your thyroid if it is overactive, and perform moderate exercise in consultation with your Primary Care Provider.  Call your doctor if you experience chest tightness/pain, lightheadedness, loss of consciousness, shortness of breath (especially with exercise), feel your heart racing or beating unusually, frequent or abnormal bleeding.  It is important to take all your heart medications as prescribed.  Some cases of Afib involve atrial fibrillation with rapid ventricular response (RVR). This is when the rapid contractions of the atria make the ventricles beat too quickly. If the ventricles beat too fast, they can’t receive enough blood. So they can’t meet the body’s need for oxygenated blood. This can lead to heart failure.  You have been put on 2 NEW medications:  - Eliquis 5mg by mouth Twice daily (this is a blood thinner)  - Toprol (Metoprolol) 50mg by mouth, daily (this is a beta blocker that helps with keeping heart rate down)  Please STOP: Bisoprolol and Effient  Please FOLLOW UP with Dr. Colón, your cardiologist.        SECONDARY DISCHARGE DIAGNOSES  Diagnosis: Chest pain  Assessment and Plan of Treatment: Call your doctor if you have any new pain, pressure, or discomfort in the center of your chest, pain, tingling or discomfort in arms, back, neck, jaw, or stomach, shortness of breath, nausea, vomiting, burping or heartburn, sweating, cold and clammy skin, racing or abnormal heartbeat for more than 10 minutes or if they keep coming & going.  Call 911 and do not try to get to hospital by car.

## 2023-07-13 NOTE — DISCHARGE NOTE PROVIDER - HOSPITAL COURSE
66 yo male h/o cad s/p pci x7, mi, htn, chol, paroxsymal afib, p/w chest pain and palpitations.    Patient was diagnosed with paroxysmal afib with RVR in the ED, cardiologist Dr. Escamilla was consulted and patient was monitored on telemetry. Echocardiogram was ordered: EF 50% Compared to the transthoracic echocardiogram performed on 4/23/2019 there have been no significant interval changes. ACS ruled out with 2 negative troponins.  CAD and HTN regimen continued same as home. Patient was told to discontinue Effient AND Bisoprolol; add Eliquis 5 BID, Toprol 50qD.    Patient is medically cleared for discharge. Medication reconciliation reviewed, revised, and resolved with Dr. Romero who had medically cleared patient for discharge with follow-up as advised. Please refer to discharge note for detailed hospital course. Patient is currently stable for discharge to home at this time. 66 yo male h/o cad s/p pci x7, mi, htn, chol, paroxsymal afib, p/w chest pain and palpitations.    Patient was diagnosed with paroxysmal afib with RVR in the ED, cardiologist Dr. Escamilla was consulted and patient was monitored on telemetry. Echocardiogram was ordered: EF 50% Compared to the transthoracic echocardiogram performed on 4/23/2019 there have been no significant interval changes. ACS ruled out with 2 negative troponins.  CAD and HTN regimen continued same as home. Patient was told to discontinue Effient AND Bisoprolol; add Eliquis 5 BID, Toprol 50qD.    Patient is medically cleared for discharge. Medication reconciliation reviewed, revised, and resolved with Dr. Romero who had medically cleared patient for discharge with follow-up as advised. Please refer to discharge note for detailed hospital course. Patient is currently stable for discharge to home at this time, and will follow up with outpatient cardiologist Dr. Colón.

## 2023-07-13 NOTE — CONSULT NOTE ADULT - ASSESSMENT
67 year-old with known coronary artery disease status post remote PCI, maintained on ASA and prasugrel, now presents with chest pain secondary to paroxysmal atrial fibrillation.  Now in normal sinus rhythm.    Recommend metoprolol for rate and rhythm control.  Discontinue prasugrel.   Continue ASA without interruption.  Start Eliquis 5 mg BID for patient with age < 80, weight > 60 kg, and creatinine < 1.5 mg/dL.    Discharge planning per primary team.
will follow up outpatient

## 2023-07-13 NOTE — DISCHARGE NOTE PROVIDER - NSDCMRMEDTOKEN_GEN_ALL_CORE_FT
aspirin 81 mg oral tablet: 1 tab(s) orally once a day  bisoprolol 10 mg oral tablet: 1 tab(s) orally once a day  citracal: 1 tab once a day  colace: 1 cap once a day  Crestor 40 mg oral tablet: 1 tab(s) orally every other day  Effient 10 mg oral tablet: 1 tab(s) orally once a day  finasteride 5 mg oral tablet: 1 tab(s) orally once a day  isosorbide mononitrate 60 mg oral tablet, extended release: 1 tab(s) orally once a day  ubiquinone 200 mg oral capsule: 200 milligram(s) orally once a day  valsartan-hydrochlorothiazide 320 mg-25 mg oral tablet: 1 tab(s) orally once a day  Vitamin D3 2000 intl units oral capsule: 1 cap(s) orally once a day   aspirin 81 mg oral tablet: 1 tab(s) orally once a day  citracal: 1 tab once a day  colace: 1 cap once a day  Crestor 40 mg oral tablet: 1 tab(s) orally every other day  Eliquis 5 mg oral tablet: 1 tab(s) orally 2 times a day  finasteride 5 mg oral tablet: 1 tab(s) orally once a day  isosorbide mononitrate 60 mg oral tablet, extended release: 1 tab(s) orally once a day  ubiquinone 200 mg oral capsule: 200 milligram(s) orally once a day  valsartan-hydrochlorothiazide 320 mg-25 mg oral tablet: 1 tab(s) orally once a day  Vitamin D3 2000 intl units oral capsule: 1 cap(s) orally once a day   aspirin 81 mg oral tablet: 1 tab(s) orally once a day  citracal: 1 tab once a day  colace: 1 cap once a day  Crestor 40 mg oral tablet: 1 tab(s) orally every other day  Eliquis 5 mg oral tablet: 1 tab(s) orally 2 times a day  finasteride 5 mg oral tablet: 1 tab(s) orally once a day  isosorbide mononitrate 60 mg oral tablet, extended release: 1 tab(s) orally once a day  metoprolol succinate 50 mg oral tablet, extended release: 1 tab(s) orally once a day  ubiquinone 200 mg oral capsule: 200 milligram(s) orally once a day  valsartan-hydrochlorothiazide 320 mg-25 mg oral tablet: 1 tab(s) orally once a day  Vitamin D3 2000 intl units oral capsule: 1 cap(s) orally once a day

## 2023-07-13 NOTE — PROGRESS NOTE ADULT - SUBJECTIVE AND OBJECTIVE BOX
JOVANI VIVEROS  67y Male  MRN:[id number]    Patient is a 67y old  Male who presents with a chief complaint of Chest pain (13 Jul 2023 12:36)    HPI:  66 yo male h/o cad s/p pci x7, mi, htn, chol, paroxsymal afib not on AC at home, p/w chest pain and palpitations.  pt felt palpitations in middle of night. took bisopolol at home  in ER pt with afib with rvr  given metoprolol iv  converted to nsr 60s  chest pain described as pressure like. lasted approx 15-30 min  currently cp free  no n/v/d  no fevers/chills  no recent travel or sick contacts (12 Jul 2023 12:59)      Patient seen and evaluated at bedside. No acute events overnight except as noted.    Interval HPI: feels well  teleL nsr     PAST MEDICAL & SURGICAL HISTORY:  Hypercholesteremia      HTN (hypertension)      H/O heart artery stent  2008, 2012      Asthma      MI (myocardial infarction)  2008      Melanoma      Ankle fracture      Atrial fibrillation  On Xarelto      No Past Surgical History      S/P angioplasty with stent      Melanoma  removal      History of coronary artery stent placement  2007, 2012, 2013          REVIEW OF SYSTEMS:  as per hpi    VITALS:  Vital Signs Last 24 Hrs  T(C): 36.5 (13 Jul 2023 11:59), Max: 37.1 (13 Jul 2023 04:46)  T(F): 97.7 (13 Jul 2023 11:59), Max: 98.7 (13 Jul 2023 04:46)  HR: 76 (13 Jul 2023 11:59) (57 - 76)  BP: 129/78 (13 Jul 2023 11:59) (129/78 - 169/86)  BP(mean): --  RR: 17 (13 Jul 2023 11:59) (16 - 18)  SpO2: 93% (13 Jul 2023 11:59) (93% - 98%)    Parameters below as of 13 Jul 2023 11:59  Patient On (Oxygen Delivery Method): room air      CAPILLARY BLOOD GLUCOSE        I&O's Summary      PHYSICAL EXAM:  GENERAL: NAD, well-developed  HEAD:  Atraumatic, Normocephalic  EYES: EOMI, PERRLA, conjunctiva and sclera clear  NECK: Supple, No JVD  CHEST/LUNG: Clear to auscultation bilaterally; No wheeze  HEART: S1, S2; No murmurs, rubs, or gallops  ABDOMEN: Soft, Nontender, Nondistended; Bowel sounds present  EXTREMITIES:  2+ Peripheral Pulses, No clubbing, cyanosis, or edema  PSYCH: Normal affect  NEUROLOGY: AAOX3; non-focal  SKIN: No rashes or lesions    Consultant(s) Notes Reviewed:  [x ] YES  [ ] NO  Care Discussed with Consultants/Other Providers [ x] YES  [ ] NO    MEDS:  MEDICATIONS  (STANDING):  apixaban 5 milliGRAM(s) Oral every 12 hours  aspirin enteric coated 81 milliGRAM(s) Oral daily  atorvastatin 40 milliGRAM(s) Oral at bedtime  finasteride 5 milliGRAM(s) Oral daily  hydrochlorothiazide 25 milliGRAM(s) Oral daily  isosorbide   mononitrate ER Tablet (IMDUR) 60 milliGRAM(s) Oral daily  metoprolol succinate ER 50 milliGRAM(s) Oral daily  valsartan 320 milliGRAM(s) Oral daily    MEDICATIONS  (PRN):    ALLERGIES:  contrast dye- itch/rash (Other)  penicillin (Anaphylaxis)  Shrimp (Anaphylaxis)      LABS:                        14.3   7.22  )-----------( 165      ( 13 Jul 2023 06:44 )             42.1     07-13    142  |  106  |  19  ----------------------------<  93  3.8   |  23  |  1.09    Ca    9.0      13 Jul 2023 06:44  Mg     1.8     07-12    TPro  5.9<L>  /  Alb  3.6  /  TBili  0.7  /  DBili  x   /  AST  21  /  ALT  35  /  AlkPhos  75  07-13          LIVER FUNCTIONS - ( 13 Jul 2023 06:44 )  Alb: 3.6 g/dL / Pro: 5.9 g/dL / ALK PHOS: 75 U/L / ALT: 35 U/L / AST: 21 U/L / GGT: x           Urinalysis Basic - ( 13 Jul 2023 06:44 )    Color: x / Appearance: x / SG: x / pH: x  Gluc: 93 mg/dL / Ketone: x  / Bili: x / Urobili: x   Blood: x / Protein: x / Nitrite: x   Leuk Esterase: x / RBC: x / WBC x   Sq Epi: x / Non Sq Epi: x / Bacteria: x      TSH: Thyroid Stimulating Hormone, Serum: 1.58 uIU/mL (07-13 @ 06:44)     < from: TTE W or WO Ultrasound Enhancing Agent (07.12.23 @ 15:30) >  ______________________  CONCLUSIONS:      1. Normal left ventricular cavity size. The left ventricular wall thickness is normal. The left ventricular systolic functionis normal with an ejection fraction of 50 % by Molina's method of disks. There are no regional wall motion abnormalities seen.   2. The left ventricular diastolic function is indeterminate.   3. Normal atria.   4. Normal right ventricular cavity size, normal right ventricular wall thickness and normal right ventricular systolic function. The tricuspid annular plane systolic excursion (TAPSE) is 2.3 cm (normal >=1.7 cm).   5. No significant valvular disease.   6. No pericardial effusion seen.   7. Compared to the transthoracic echocardiogram performed on 4/23/2019 there have been no significant interval changes.    __________________________    < end of copied text >

## 2023-07-13 NOTE — CONSULT NOTE ADULT - SUBJECTIVE AND OBJECTIVE BOX
Patient seen and evaluated @ 9am in [facility] ED  Chief Complaint: chest pain    HPI:  68 yo male h/o cad s/p pci x7, mi, htn, chol, paroxsymal afib not on AC at home, p/w chest pain and palpitations.  pt felt palpitations in middle of night. took bisopolol at home  in ER pt with afib with rvr  given metoprolol iv  converted to nsr 60s  chest pain described as pressure like. lasted approx 15-30 min  currently cp free  no n/v/d  no fevers/chills  no recent travel or sick contacts (12 Jul 2023 12:59)    PMH:   Hypercholesteremia    HTN (hypertension)    H/O heart artery stent    Asthma    MI (myocardial infarction)    Melanoma    Ankle fracture    Atrial fibrillation      PSH:   No Past Surgical History    S/P angioplasty with stent    Melanoma    History of coronary artery stent placement      Medications:   apixaban 5 milliGRAM(s) Oral every 12 hours  aspirin enteric coated 81 milliGRAM(s) Oral daily  atorvastatin 40 milliGRAM(s) Oral at bedtime  finasteride 5 milliGRAM(s) Oral daily  hydrochlorothiazide 25 milliGRAM(s) Oral daily  isosorbide   mononitrate ER Tablet (IMDUR) 60 milliGRAM(s) Oral daily  metoprolol succinate ER 50 milliGRAM(s) Oral daily  valsartan 320 milliGRAM(s) Oral daily    Allergies:  contrast dye- itch/rash (Other)  penicillin (Anaphylaxis)  Shrimp (Anaphylaxis)    FAMILY HISTORY:    Social History:  Smoking:  Alcohol:  Drugs:    Review of Systems:  [ ]Unable to obtain  Constitutional: No fever, chills, fatigue, or changes in weight  HEENT: No blurry vision, eye pain, headache, runny nose, or sore throat  Respiratory: No shortness of breath, cough, or wheezing  Cardiovascular: No chest pain or palpitations  Gastrointestinal: No nausea, vomiting, diarrhea, or abdominal pain  Genitourinary: No dysuria or incontinence  Extremities: No lower extremity swelling  Neurologic: No focal findings  Lymphatic: No lymphadenopathy   Skin: No rash  Psychiatry: No anxiety or depression  10 point review of systems is otherwise negative except as mentioned above            Physical Exam:  T(C): 36.6 (07-13-23 @ 14:16), Max: 37.1 (07-13-23 @ 04:46)  HR: 56 (07-13-23 @ 14:16) (56 - 76)  BP: 129/78 (07-13-23 @ 11:59) (129/78 - 141/70)  RR: 18 (07-13-23 @ 14:16) (17 - 18)  SpO2: 94% (07-13-23 @ 14:16) (93% - 96%)  Wt(kg): --    Daily     Daily     Appearance: Normal, well groomed, NAD  Eyes: PERRLA, EOMI, pink conjunctiva, no scleral icterus   HENT: Normal oral mucosa  Cardiovascular: RRR, S1, S2, no murmur, rub, or gallop; no edema; no JVD  Procedural Access Site: Clean, dry, intact, without hematoma  Respiratory: Clear to auscultation bilaterally  Gastrointestinal: Soft, non-tender, non-distended, BS+  Musculoskeletal: No clubbing or joint deformity   Neurologic: No focal weakness  Lymphatic: No lymphadenopathy  Psychiatry: AAOx3 with appropriate mood and affect  Skin: No rashes, ecchymoses, or cyanosis    Cardiovascular Diagnostic Testing:  ECG:    Echo:    Stress Testing:    Cath:    Interpretation of Telemetry:    Imaging:    Labs:                        14.3   7.22  )-----------( 165      ( 13 Jul 2023 06:44 )             42.1     07-13    142  |  106  |  19  ----------------------------<  93  3.8   |  23  |  1.09    Ca    9.0      13 Jul 2023 06:44  Mg     1.8     07-12    TPro  5.9<L>  /  Alb  3.6  /  TBili  0.7  /  DBili  x   /  AST  21  /  ALT  35  /  AlkPhos  75  07-13                Thyroid Stimulating Hormone, Serum: 1.58 uIU/mL (07-13 @ 06:44)

## 2023-07-22 ENCOUNTER — INPATIENT (INPATIENT)
Facility: HOSPITAL | Age: 68
LOS: 2 days | Discharge: HOME HEALTH | DRG: 250 | End: 2023-07-25
Attending: INTERNAL MEDICINE | Admitting: STUDENT IN AN ORGANIZED HEALTH CARE EDUCATION/TRAINING PROGRAM
Payer: COMMERCIAL

## 2023-07-22 VITALS
WEIGHT: 225.09 LBS | SYSTOLIC BLOOD PRESSURE: 150 MMHG | OXYGEN SATURATION: 97 % | TEMPERATURE: 97 F | HEIGHT: 70 IN | HEART RATE: 114 BPM | RESPIRATION RATE: 20 BRPM | DIASTOLIC BLOOD PRESSURE: 128 MMHG

## 2023-07-22 DIAGNOSIS — C43.9 MALIGNANT MELANOMA OF SKIN, UNSPECIFIED: Chronic | ICD-10-CM

## 2023-07-22 DIAGNOSIS — Z95.5 PRESENCE OF CORONARY ANGIOPLASTY IMPLANT AND GRAFT: Chronic | ICD-10-CM

## 2023-07-22 DIAGNOSIS — I21.3 ST ELEVATION (STEMI) MYOCARDIAL INFARCTION OF UNSPECIFIED SITE: ICD-10-CM

## 2023-07-22 LAB
A1C WITH ESTIMATED AVERAGE GLUCOSE RESULT: 5.6 % — SIGNIFICANT CHANGE UP (ref 4–5.6)
ALBUMIN SERPL ELPH-MCNC: 3.8 G/DL — SIGNIFICANT CHANGE UP (ref 3.3–5)
ALBUMIN SERPL ELPH-MCNC: 4.1 G/DL — SIGNIFICANT CHANGE UP (ref 3.3–5)
ALBUMIN SERPL ELPH-MCNC: 4.6 G/DL — SIGNIFICANT CHANGE UP (ref 3.3–5)
ALP SERPL-CCNC: 107 U/L — SIGNIFICANT CHANGE UP (ref 40–120)
ALP SERPL-CCNC: 121 U/L — HIGH (ref 40–120)
ALP SERPL-CCNC: 96 U/L — SIGNIFICANT CHANGE UP (ref 40–120)
ALT FLD-CCNC: 28 U/L — SIGNIFICANT CHANGE UP (ref 10–45)
ALT FLD-CCNC: 28 U/L — SIGNIFICANT CHANGE UP (ref 10–45)
ALT FLD-CCNC: 31 U/L — SIGNIFICANT CHANGE UP (ref 10–45)
ANION GAP SERPL CALC-SCNC: 12 MMOL/L — SIGNIFICANT CHANGE UP (ref 5–17)
ANION GAP SERPL CALC-SCNC: 13 MMOL/L — SIGNIFICANT CHANGE UP (ref 5–17)
ANION GAP SERPL CALC-SCNC: 20 MMOL/L — HIGH (ref 5–17)
APTT BLD: 36.4 SEC — HIGH (ref 27.5–35.5)
AST SERPL-CCNC: 20 U/L — SIGNIFICANT CHANGE UP (ref 10–40)
AST SERPL-CCNC: 31 U/L — SIGNIFICANT CHANGE UP (ref 10–40)
AST SERPL-CCNC: 67 U/L — HIGH (ref 10–40)
BASE EXCESS BLDV CALC-SCNC: 3.1 MMOL/L — HIGH (ref -2–3)
BASOPHILS # BLD AUTO: 0.07 K/UL — SIGNIFICANT CHANGE UP (ref 0–0.2)
BASOPHILS NFR BLD AUTO: 1 % — SIGNIFICANT CHANGE UP (ref 0–2)
BILIRUB SERPL-MCNC: 0.7 MG/DL — SIGNIFICANT CHANGE UP (ref 0.2–1.2)
BILIRUB SERPL-MCNC: 0.7 MG/DL — SIGNIFICANT CHANGE UP (ref 0.2–1.2)
BILIRUB SERPL-MCNC: 0.8 MG/DL — SIGNIFICANT CHANGE UP (ref 0.2–1.2)
BUN SERPL-MCNC: 22 MG/DL — SIGNIFICANT CHANGE UP (ref 7–23)
BUN SERPL-MCNC: 22 MG/DL — SIGNIFICANT CHANGE UP (ref 7–23)
BUN SERPL-MCNC: 24 MG/DL — HIGH (ref 7–23)
CA-I SERPL-SCNC: 1.15 MMOL/L — SIGNIFICANT CHANGE UP (ref 1.15–1.33)
CALCIUM SERPL-MCNC: 10.2 MG/DL — SIGNIFICANT CHANGE UP (ref 8.4–10.5)
CALCIUM SERPL-MCNC: 9.3 MG/DL — SIGNIFICANT CHANGE UP (ref 8.4–10.5)
CALCIUM SERPL-MCNC: 9.5 MG/DL — SIGNIFICANT CHANGE UP (ref 8.4–10.5)
CHLORIDE BLDV-SCNC: 101 MMOL/L — SIGNIFICANT CHANGE UP (ref 96–108)
CHLORIDE SERPL-SCNC: 100 MMOL/L — SIGNIFICANT CHANGE UP (ref 96–108)
CHLORIDE SERPL-SCNC: 102 MMOL/L — SIGNIFICANT CHANGE UP (ref 96–108)
CHLORIDE SERPL-SCNC: 102 MMOL/L — SIGNIFICANT CHANGE UP (ref 96–108)
CHOLEST SERPL-MCNC: 176 MG/DL — SIGNIFICANT CHANGE UP
CK MB BLD-MCNC: 16.3 % — HIGH (ref 0–3.5)
CK MB BLD-MCNC: 18.6 % — HIGH (ref 0–3.5)
CK MB CFR SERPL CALC: 62.2 NG/ML — HIGH (ref 0–6.7)
CK MB CFR SERPL CALC: 72.5 NG/ML — HIGH (ref 0–6.7)
CK SERPL-CCNC: 381 U/L — HIGH (ref 30–200)
CK SERPL-CCNC: 389 U/L — HIGH (ref 30–200)
CO2 BLDV-SCNC: 27 MMOL/L — HIGH (ref 22–26)
CO2 SERPL-SCNC: 21 MMOL/L — LOW (ref 22–31)
CO2 SERPL-SCNC: 24 MMOL/L — SIGNIFICANT CHANGE UP (ref 22–31)
CO2 SERPL-SCNC: 25 MMOL/L — SIGNIFICANT CHANGE UP (ref 22–31)
CREAT SERPL-MCNC: 0.91 MG/DL — SIGNIFICANT CHANGE UP (ref 0.5–1.3)
CREAT SERPL-MCNC: 1.04 MG/DL — SIGNIFICANT CHANGE UP (ref 0.5–1.3)
CREAT SERPL-MCNC: 1.14 MG/DL — SIGNIFICANT CHANGE UP (ref 0.5–1.3)
EGFR: 71 ML/MIN/1.73M2 — SIGNIFICANT CHANGE UP
EGFR: 79 ML/MIN/1.73M2 — SIGNIFICANT CHANGE UP
EGFR: 93 ML/MIN/1.73M2 — SIGNIFICANT CHANGE UP
EOSINOPHIL # BLD AUTO: 0.23 K/UL — SIGNIFICANT CHANGE UP (ref 0–0.5)
EOSINOPHIL NFR BLD AUTO: 3.1 % — SIGNIFICANT CHANGE UP (ref 0–6)
ESTIMATED AVERAGE GLUCOSE: 114 MG/DL — SIGNIFICANT CHANGE UP (ref 68–114)
GAS PNL BLDV: 134 MMOL/L — LOW (ref 136–145)
GAS PNL BLDV: SIGNIFICANT CHANGE UP
GAS PNL BLDV: SIGNIFICANT CHANGE UP
GLUCOSE BLDV-MCNC: 148 MG/DL — HIGH (ref 70–99)
GLUCOSE SERPL-MCNC: 134 MG/DL — HIGH (ref 70–99)
GLUCOSE SERPL-MCNC: 139 MG/DL — HIGH (ref 70–99)
GLUCOSE SERPL-MCNC: 150 MG/DL — HIGH (ref 70–99)
HCO3 BLDV-SCNC: 26 MMOL/L — SIGNIFICANT CHANGE UP (ref 22–29)
HCT VFR BLD CALC: 51.8 % — HIGH (ref 39–50)
HCT VFR BLDA CALC: 54 % — HIGH (ref 39–51)
HDLC SERPL-MCNC: 34 MG/DL — LOW
HGB BLD CALC-MCNC: 17.9 G/DL — HIGH (ref 12.6–17.4)
HGB BLD-MCNC: 17.2 G/DL — HIGH (ref 13–17)
IMM GRANULOCYTES NFR BLD AUTO: 0.3 % — SIGNIFICANT CHANGE UP (ref 0–0.9)
INR BLD: 1.06 RATIO — SIGNIFICANT CHANGE UP (ref 0.88–1.16)
LACTATE BLDV-MCNC: 5.5 MMOL/L — CRITICAL HIGH (ref 0.5–2)
LACTATE SERPL-SCNC: 2.3 MMOL/L — HIGH (ref 0.5–2)
LACTATE SERPL-SCNC: 2.6 MMOL/L — HIGH (ref 0.5–2)
LIPID PNL WITH DIRECT LDL SERPL: 108 MG/DL — HIGH
LYMPHOCYTES # BLD AUTO: 2.11 K/UL — SIGNIFICANT CHANGE UP (ref 1–3.3)
LYMPHOCYTES # BLD AUTO: 28.9 % — SIGNIFICANT CHANGE UP (ref 13–44)
MAGNESIUM SERPL-MCNC: 1.8 MG/DL — SIGNIFICANT CHANGE UP (ref 1.6–2.6)
MAGNESIUM SERPL-MCNC: 1.8 MG/DL — SIGNIFICANT CHANGE UP (ref 1.6–2.6)
MAGNESIUM SERPL-MCNC: 1.9 MG/DL — SIGNIFICANT CHANGE UP (ref 1.6–2.6)
MCHC RBC-ENTMCNC: 29.1 PG — SIGNIFICANT CHANGE UP (ref 27–34)
MCHC RBC-ENTMCNC: 33.2 GM/DL — SIGNIFICANT CHANGE UP (ref 32–36)
MCV RBC AUTO: 87.5 FL — SIGNIFICANT CHANGE UP (ref 80–100)
MONOCYTES # BLD AUTO: 0.48 K/UL — SIGNIFICANT CHANGE UP (ref 0–0.9)
MONOCYTES NFR BLD AUTO: 6.6 % — SIGNIFICANT CHANGE UP (ref 2–14)
NEUTROPHILS # BLD AUTO: 4.4 K/UL — SIGNIFICANT CHANGE UP (ref 1.8–7.4)
NEUTROPHILS NFR BLD AUTO: 60.1 % — SIGNIFICANT CHANGE UP (ref 43–77)
NON HDL CHOLESTEROL: 143 MG/DL — HIGH
NRBC # BLD: 0 /100 WBCS — SIGNIFICANT CHANGE UP (ref 0–0)
NT-PROBNP SERPL-SCNC: 83 PG/ML — SIGNIFICANT CHANGE UP (ref 0–300)
PCO2 BLDV: 34 MMHG — LOW (ref 42–55)
PH BLDV: 7.49 — HIGH (ref 7.32–7.43)
PHOSPHATE SERPL-MCNC: 2.8 MG/DL — SIGNIFICANT CHANGE UP (ref 2.5–4.5)
PHOSPHATE SERPL-MCNC: 3.1 MG/DL — SIGNIFICANT CHANGE UP (ref 2.5–4.5)
PLATELET # BLD AUTO: 189 K/UL — SIGNIFICANT CHANGE UP (ref 150–400)
PO2 BLDV: 27 MMHG — SIGNIFICANT CHANGE UP (ref 25–45)
POTASSIUM BLDV-SCNC: 3.5 MMOL/L — SIGNIFICANT CHANGE UP (ref 3.5–5.1)
POTASSIUM SERPL-MCNC: 3.5 MMOL/L — SIGNIFICANT CHANGE UP (ref 3.5–5.3)
POTASSIUM SERPL-MCNC: 3.7 MMOL/L — SIGNIFICANT CHANGE UP (ref 3.5–5.3)
POTASSIUM SERPL-MCNC: 4.2 MMOL/L — SIGNIFICANT CHANGE UP (ref 3.5–5.3)
POTASSIUM SERPL-SCNC: 3.5 MMOL/L — SIGNIFICANT CHANGE UP (ref 3.5–5.3)
POTASSIUM SERPL-SCNC: 3.7 MMOL/L — SIGNIFICANT CHANGE UP (ref 3.5–5.3)
POTASSIUM SERPL-SCNC: 4.2 MMOL/L — SIGNIFICANT CHANGE UP (ref 3.5–5.3)
PROT SERPL-MCNC: 6.7 G/DL — SIGNIFICANT CHANGE UP (ref 6–8.3)
PROT SERPL-MCNC: 7.3 G/DL — SIGNIFICANT CHANGE UP (ref 6–8.3)
PROT SERPL-MCNC: 8 G/DL — SIGNIFICANT CHANGE UP (ref 6–8.3)
PROTHROM AB SERPL-ACNC: 12.3 SEC — SIGNIFICANT CHANGE UP (ref 10.5–13.4)
RBC # BLD: 5.92 M/UL — HIGH (ref 4.2–5.8)
RBC # FLD: 13 % — SIGNIFICANT CHANGE UP (ref 10.3–14.5)
SAO2 % BLDV: 51.8 % — LOW (ref 67–88)
SODIUM SERPL-SCNC: 139 MMOL/L — SIGNIFICANT CHANGE UP (ref 135–145)
SODIUM SERPL-SCNC: 139 MMOL/L — SIGNIFICANT CHANGE UP (ref 135–145)
SODIUM SERPL-SCNC: 141 MMOL/L — SIGNIFICANT CHANGE UP (ref 135–145)
TRIGL SERPL-MCNC: 199 MG/DL — HIGH
TROPONIN T, HIGH SENSITIVITY RESULT: 199 NG/L — HIGH (ref 0–51)
TROPONIN T, HIGH SENSITIVITY RESULT: 20 NG/L — SIGNIFICANT CHANGE UP (ref 0–51)
TROPONIN T, HIGH SENSITIVITY RESULT: 384 NG/L — HIGH (ref 0–51)
TSH SERPL-MCNC: 0.47 UIU/ML — SIGNIFICANT CHANGE UP (ref 0.27–4.2)
TSH SERPL-MCNC: 0.95 UIU/ML — SIGNIFICANT CHANGE UP (ref 0.27–4.2)
WBC # BLD: 7.31 K/UL — SIGNIFICANT CHANGE UP (ref 3.8–10.5)
WBC # FLD AUTO: 7.31 K/UL — SIGNIFICANT CHANGE UP (ref 3.8–10.5)

## 2023-07-22 PROCEDURE — 99292 CRITICAL CARE ADDL 30 MIN: CPT

## 2023-07-22 PROCEDURE — 93458 L HRT ARTERY/VENTRICLE ANGIO: CPT | Mod: 26,59

## 2023-07-22 PROCEDURE — 71045 X-RAY EXAM CHEST 1 VIEW: CPT | Mod: 26

## 2023-07-22 PROCEDURE — 99291 CRITICAL CARE FIRST HOUR: CPT

## 2023-07-22 PROCEDURE — 93010 ELECTROCARDIOGRAM REPORT: CPT

## 2023-07-22 PROCEDURE — 92941 PRQ TRLML REVSC TOT OCCL AMI: CPT | Mod: RC

## 2023-07-22 PROCEDURE — 99152 MOD SED SAME PHYS/QHP 5/>YRS: CPT

## 2023-07-22 PROCEDURE — 99223 1ST HOSP IP/OBS HIGH 75: CPT | Mod: GC

## 2023-07-22 PROCEDURE — 93306 TTE W/DOPPLER COMPLETE: CPT | Mod: 26

## 2023-07-22 RX ORDER — POTASSIUM CHLORIDE 20 MEQ
40 PACKET (EA) ORAL ONCE
Refills: 0 | Status: COMPLETED | OUTPATIENT
Start: 2023-07-22 | End: 2023-07-22

## 2023-07-22 RX ORDER — MORPHINE SULFATE 50 MG/1
4 CAPSULE, EXTENDED RELEASE ORAL ONCE
Refills: 0 | Status: DISCONTINUED | OUTPATIENT
Start: 2023-07-22 | End: 2023-07-22

## 2023-07-22 RX ORDER — MAGNESIUM SULFATE 500 MG/ML
2 VIAL (ML) INJECTION ONCE
Refills: 0 | Status: COMPLETED | OUTPATIENT
Start: 2023-07-22 | End: 2023-07-22

## 2023-07-22 RX ORDER — HEPARIN SODIUM 5000 [USP'U]/ML
1000 INJECTION INTRAVENOUS; SUBCUTANEOUS
Qty: 25000 | Refills: 0 | Status: DISCONTINUED | OUTPATIENT
Start: 2023-07-22 | End: 2023-07-25

## 2023-07-22 RX ORDER — ONDANSETRON 8 MG/1
4 TABLET, FILM COATED ORAL ONCE
Refills: 0 | Status: COMPLETED | OUTPATIENT
Start: 2023-07-22 | End: 2023-07-22

## 2023-07-22 RX ORDER — LOSARTAN POTASSIUM 100 MG/1
12.5 TABLET, FILM COATED ORAL DAILY
Refills: 0 | Status: DISCONTINUED | OUTPATIENT
Start: 2023-07-22 | End: 2023-07-23

## 2023-07-22 RX ORDER — CHLORHEXIDINE GLUCONATE 213 G/1000ML
1 SOLUTION TOPICAL AT BEDTIME
Refills: 0 | Status: DISCONTINUED | OUTPATIENT
Start: 2023-07-22 | End: 2023-07-23

## 2023-07-22 RX ORDER — ASPIRIN/CALCIUM CARB/MAGNESIUM 324 MG
324 TABLET ORAL ONCE
Refills: 0 | Status: COMPLETED | OUTPATIENT
Start: 2023-07-22 | End: 2023-07-22

## 2023-07-22 RX ORDER — ATORVASTATIN CALCIUM 80 MG/1
80 TABLET, FILM COATED ORAL AT BEDTIME
Refills: 0 | Status: DISCONTINUED | OUTPATIENT
Start: 2023-07-22 | End: 2023-07-25

## 2023-07-22 RX ORDER — CLOPIDOGREL BISULFATE 75 MG/1
75 TABLET, FILM COATED ORAL DAILY
Refills: 0 | Status: DISCONTINUED | OUTPATIENT
Start: 2023-07-23 | End: 2023-07-25

## 2023-07-22 RX ORDER — CHLORHEXIDINE GLUCONATE 213 G/1000ML
1 SOLUTION TOPICAL
Refills: 0 | Status: DISCONTINUED | OUTPATIENT
Start: 2023-07-22 | End: 2023-07-23

## 2023-07-22 RX ORDER — CLOPIDOGREL BISULFATE 75 MG/1
600 TABLET, FILM COATED ORAL ONCE
Refills: 0 | Status: COMPLETED | OUTPATIENT
Start: 2023-07-22 | End: 2023-07-22

## 2023-07-22 RX ORDER — TICAGRELOR 90 MG/1
180 TABLET ORAL ONCE
Refills: 0 | Status: COMPLETED | OUTPATIENT
Start: 2023-07-22 | End: 2023-07-22

## 2023-07-22 RX ORDER — ASPIRIN/CALCIUM CARB/MAGNESIUM 324 MG
81 TABLET ORAL DAILY
Refills: 0 | Status: DISCONTINUED | OUTPATIENT
Start: 2023-07-23 | End: 2023-07-25

## 2023-07-22 RX ORDER — FINASTERIDE 5 MG/1
5 TABLET, FILM COATED ORAL DAILY
Refills: 0 | Status: DISCONTINUED | OUTPATIENT
Start: 2023-07-22 | End: 2023-07-25

## 2023-07-22 RX ADMIN — Medication 25 GRAM(S): at 10:13

## 2023-07-22 RX ADMIN — Medication 40 MILLIEQUIVALENT(S): at 21:41

## 2023-07-22 RX ADMIN — ONDANSETRON 4 MILLIGRAM(S): 8 TABLET, FILM COATED ORAL at 05:22

## 2023-07-22 RX ADMIN — HEPARIN SODIUM 10 UNIT(S)/HR: 5000 INJECTION INTRAVENOUS; SUBCUTANEOUS at 18:26

## 2023-07-22 RX ADMIN — Medication 324 MILLIGRAM(S): at 05:22

## 2023-07-22 RX ADMIN — TICAGRELOR 180 MILLIGRAM(S): 90 TABLET ORAL at 05:22

## 2023-07-22 RX ADMIN — MORPHINE SULFATE 4 MILLIGRAM(S): 50 CAPSULE, EXTENDED RELEASE ORAL at 05:15

## 2023-07-22 RX ADMIN — ATORVASTATIN CALCIUM 80 MILLIGRAM(S): 80 TABLET, FILM COATED ORAL at 20:55

## 2023-07-22 RX ADMIN — Medication 25 GRAM(S): at 21:42

## 2023-07-22 RX ADMIN — FINASTERIDE 5 MILLIGRAM(S): 5 TABLET, FILM COATED ORAL at 13:48

## 2023-07-22 RX ADMIN — CLOPIDOGREL BISULFATE 600 MILLIGRAM(S): 75 TABLET, FILM COATED ORAL at 20:54

## 2023-07-22 NOTE — ED ADULT NURSE NOTE - OBJECTIVE STATEMENT
Pt is a 67y male who presents to [facility] ED c/o of CP. Pt endorses he woke up from his sleep @0100 with generalized chest pain across his chest radiating down both his arm, describes the pain as a sharp stabbing pain located dead center of his chest. Pt endorses taking x2 doses of his sublingual nitro at home and his metoprolol and came to the ED for eval. Currently rates pain 6-7/10, and endorses feeling tremorous/shakey. PMH of Afib (on Eliquis), MI(2007), NSTEMI (2019), HTN, HLD, PSH of x10 cardiac stent placement. Pt is A&Ox4 placed on the CM, EKG performed at bedside, placed on pads as per CARDS rec at bedside. Pt endorses shallow breathing, placed on O2 via nasal cannula 2L. Peripheral IVs placed by ED RN, 18G in R+L AC.

## 2023-07-22 NOTE — ED PROVIDER NOTE - CLINICAL SUMMARY MEDICAL DECISION MAKING FREE TEXT BOX
Differential is not limited to ACS, PNA, CHF, pulmonary edema, pericarditis, myocarditis.  Will obtain basic labs, cardiac labs, EKG, chest x-ray.  Dispo pending labs imaging and reassessment.    On EKG ST elevations present in leads II, III and aVF.  Concern for STEMI at this time.  Will contact cardiology.

## 2023-07-22 NOTE — CHART NOTE - NSCHARTNOTEFT_GEN_A_CORE
Registration Time:  Initial EC  Called by ED: 0458  Saw patient at bedside: 050  Called Cath Attendin  Balloon Time:    Patient seen and evaluated at bedside    Chief Complaint: Chest pain    HPI:    67-year-old male history of CAD with 10 stents last one placed in 2019, HTN, A-fib on eliquis, HLD presenting with a chief complaint of chest pain.  Patient was woken up from sleep with midsternal chest pain radiating down his left arm. He took 2 nitroglycerin tabs, his metoprolol and BP meds with no improvement in his pain. He endorsed associated SOB, mild nausea and diaphoresis.     ECG review: A.fib with ST segment elevation in inferior leads and reciprocal ST segment depressions in lateral leads    BP right arm 161/126 Left arm 178/121     PMHx:   Hypercholesteremia    HTN (hypertension)    H/O heart artery stent    Asthma    MI (myocardial infarction)    Melanoma    Ankle fracture    Atrial fibrillation        PSHx:   No Past Surgical History    S/P angioplasty with stent    Melanoma    History of coronary artery stent placement    Current Meds:     Allergies:  contrast dye- itch/rash (Other)  penicillin (Anaphylaxis)  Shrimp (Anaphylaxis)    FAMILY HISTORY:    REVIEW OF SYSTEMS:  CONSTITUTIONAL: No weakness, fevers or chills  EYES/ENT: No visual changes;  No dysphagia  NECK: No pain or stiffness  RESPIRATORY: No cough, wheezing, hemoptysis; No shortness of breath  CARDIOVASCULAR: No chest pain or palpitations; No lower extremity edema  GASTROINTESTINAL: No abdominal or epigastric pain. No nausea, vomiting, or hematemesis; No diarrhea or constipation. No melena or hematochezia.  BACK: No back pain  GENITOURINARY: No dysuria, frequency or hematuria  NEUROLOGICAL: No numbness or weakness  SKIN: No itching, burning, rashes, or lesions   All other review of systems is negative unless indicated above.    Physical Exam:  T(F): 96.8 (-), Max: 96.8 ()  HR: 117 () (114 - 117)  BP: 178/105 (-) (150/128 - 178/105)  RR: 20 (-)  SpO2: 96% (-)    GENERAL: In distress  HEAD:  Atraumatic, Normocephalic  ENT: EOMI, PERRLA, conjunctiva and sclera clear, Neck supple, No JVD, moist mucosa  CHEST/LUNG: Clear to auscultation bilaterally; No wheeze, equal breath sounds bilaterally   BACK: No spinal tenderness  HEART: Tachycardic, irregularly irregular  ABDOMEN: Soft, Nontender, Nondistended; Bowel sounds present  EXTREMITIES:  No clubbing, cyanosis, or edema  PSYCH: Nl behavior, nl affect  NEUROLOGY: AAOx3, non-focal, cranial nerves intact  SKIN: Normal color, No rashes or lesions  LINES:    Cardiovascular Diagnostic Testing:    ECG: Personally reviewed    Labs: Personally reviewed                        17.2   7.31  )-----------( 189      ( 2023 05:23 )             51.8     -    141  |  100  |  24<H>  ----------------------------<  150<H>  3.5   |  21<L>  |  1.04    Ca    10.2      2023 05:23  Mg     1.9         TPro  8.0  /  Alb  4.6  /  TBili  0.7  /  DBili  x   /  AST  20  /  ALT  31  /  AlkPhos  121<H>  07-22    PT/INR - ( 2023 05:23 )   PT: 12.3 sec;   INR: 1.06 ratio         PTT - ( 2023 05:23 )  PTT:36.4 sec  CARDIAC MARKERS ( 2023 05:23 )  20 ng/L / x     / x     / x     / x     / x    Plan  - S/p ASA and Brilinta load  - Nasal Canula O2 for comfort  - Patient taken to cath lab for emergent LHC  Dispo: CCU      Fredy Aleman MD  Cardiology Fellow

## 2023-07-22 NOTE — H&P ADULT - ASSESSMENT
66 y/o M w/ a significant cardiac hx of CAD (s/p 10 Stents, Last one 2019), HTN, HLD, AFIB (on Eliquis) admitted w/ STEMI s/p LHC POBA to RCA    # Neuro  aaox 3 No active issues  - Neuro assessment as per ICU protocol    #Pulm  Denies SOB or dyspnea   O 2 sat >92% on room air   - Oxygen supplement as needed     # Cardiovascular   admitted w/ STEMI s/p POBA to RCA  s/p Asprin and Brilinta loaded  - Cont. ASA and Brilinta  - was on Crestor 40 and now on Lipitor 80mg HS  - GDMT  - Trend CE  -  Echo pending   - Risk stratification: TSH, Lipid and A1c    # GI  - DASH diet    # RENAL cR 0.9-1.0   - Monitor U/O, BUN/Cr, and lytes  - Maintain K+>4.0 mag >2.0    #ID   Pt is afebrile and no leukocytosis  No need for abx at this point  - Trend WBC      68 y/o M w/ a significant cardiac hx of CAD (s/p 10 Stents, Last one 2019), HTN, HLD, AFIB (on Eliquis) admitted w/ STEMI s/p LHC POBA to RCA    # Neuro  aaox 3 No active issues  - Neuro assessment as per ICU protocol    #Pulm  Denies SOB or dyspnea   O 2 sat >92% on room air   - Oxygen supplement as needed     # Cardiovascular   admitted w/ STEMI s/p POBA to RCA  s/p Asprin and Brilinta loaded  - Cont. ASA and Brilinta  - was on Crestor 40 and now on Lipitor 80mg HS  - Will be on triple therapy but at this point, continue aspirin, plavix and Hep gtt   - Follow up w/ Dr. Camacho Monday   - GDMT  - Trend CE  -  Echo pending   - Risk stratification: TSH, Lipid and A1c    # GI  - DASH diet    # RENAL cR 0.9-1.0   - Monitor U/O, BUN/Cr, and lytes  - Maintain K+>4.0 mag >2.0    #ID   Pt is afebrile and no leukocytosis  No need for abx at this point  - Trend WBC

## 2023-07-22 NOTE — PATIENT PROFILE ADULT - FALL HARM RISK - HARM RISK INTERVENTIONS

## 2023-07-22 NOTE — ED ADULT NURSE NOTE - NSFALLUNIVINTERV_ED_ALL_ED
Bed/Stretcher in lowest position, wheels locked, appropriate side rails in place/Call bell, personal items and telephone in reach/Instruct patient to call for assistance before getting out of bed/chair/stretcher/Non-slip footwear applied when patient is off stretcher/[place] to call system/Physically safe environment - no spills, clutter or unnecessary equipment/Purposeful proactive rounding/Room/bathroom lighting operational, light cord in reach

## 2023-07-22 NOTE — ED PROVIDER NOTE - PROGRESS NOTE DETAILS
Kriss Michael, PGY-2 DO:  Cardiology consulted, patient is being evaluated. Patient given aspirin, Brilinta and morphine.

## 2023-07-22 NOTE — ED ADULT NURSE NOTE - NSICDXPASTMEDICALHX_GEN_ALL_CORE_FT
Pt completed PO contrast in preparation for CTAP. Pt does not appear in any acute distress at this time & does not have any complaints. Will continue to reassess. PAST MEDICAL HISTORY:  Ankle fracture     Asthma     Atrial fibrillation On Xarelto    H/O heart artery stent 2008, 2012    HTN (hypertension)     Hypercholesteremia     Melanoma     MI (myocardial infarction) 2008

## 2023-07-22 NOTE — ED PROVIDER NOTE - ATTENDING CONTRIBUTION TO CARE
I, Ben Granados, performed a history and physical exam of the patient and discussed their management with the resident and/or advanced care provider. I reviewed the resident and/or advanced care provider's note and agree with the documented findings and plan of care. I was present and available for all procedures.    Patient with significant cardiac history including 10 stents presenting with acute onset chest pain and STEMI on EKG vital signs significant for A-fib RVR as well as hypertension otherwise patient exam significant for distress but otherwise reassuring exam    Cardiology consulted will evaluate patient disposition for likely cardiac catheterization discussed with patient agreeable with plan unlikely PE pneumothorax dissection AAA pneumonia

## 2023-07-22 NOTE — ED ADULT NURSE REASSESSMENT NOTE - NS ED NURSE REASSESS COMMENT FT1
PT brought straight to cath lab with ED RN, ED tech, Cardiologist MD, pt telepacked and on pads. Bedside report given to Cath lab RN Mariel.

## 2023-07-22 NOTE — H&P ADULT - CRITICAL CARE ATTENDING COMMENT
STEMI s/p PCI with POBA to RCA  - educated on the importance of DAPT   - Ace-inhibitor initiated  - Beta-blocker initiated  - patient is on Lipitor 80 mg daily  - trend cardiac enzymes  - TTE prior to discharge  - c/w hep gtt for the afib    Patient is critically ill, requiring critical care services. Despite the current condition, the patient is at a high risk of clinical and hemodynamic demise

## 2023-07-22 NOTE — ED PROVIDER NOTE - PHYSICAL EXAMINATION
GENERAL: Awake, alert, mild distress  HEENT: NC/AT, moist mucous membranes, PERRL, EOMI  LUNGS: CTAB, no wheezes or crackles   CARDIAC: RRR, no m/r/g  ABDOMEN: Soft, non tender, non distended, no rebound, no guarding  BACK: No midline spinal tenderness, no CVA tenderness  EXT: No edema, no calf tenderness, 2+ DP pulses bilaterally, no deformities.  NEURO: A&Ox3. Moving all extremities.  SKIN: Warm and dry. No rash.  PSYCH: Normal affect. GENERAL: Awake, alert, mild distress  HEENT: NC/AT, moist mucous membranes, PERRL, EOMI  LUNGS: CTAB, no wheezes or crackles   CARDIAC: tachycardic, no m/r/g  ABDOMEN: Soft, non tender, non distended, no rebound, no guarding  BACK: No midline spinal tenderness, no CVA tenderness  EXT: No edema, no calf tenderness, 2+ DP pulses bilaterally, no deformities.  NEURO: A&Ox3. Moving all extremities.  SKIN: Warm and dry. No rash.  PSYCH: Normal affect.

## 2023-07-22 NOTE — PATIENT PROFILE ADULT - TRANSPORTATION
Physical Therapy Evaluation    Visit Count: 1   Plan of Care: 3/21/2019 Through: 6/13/2019  Insurance Information: Name of Insurance: [employer]  Insurance Phone #: [phone]  : IVY     Copay: $20  NONE Deductible NONEmet  Coinsurance percentage: NONE  $2,500 Out of pocket $982.44 met     Iontophoresis covered service (31478)? Yes  [employer] covered service (86593)? N/A  PTA allowed? Yes  Are compression garments/stocking for lymphedema coverage? N/A    (arm sleeve or glove)   (knee high 20-30mmHG)   (knee high 30-40mmHG)  Number of garments/stocking are allowed per year.N/A     Visit Limit= 90 VISITS PCY COMBINED WITH PT, OT & SPEECH  Visits Utilized= 0 VISITS USED      Precertification=  No  Referred by: Victor Manuel Joshi MD; Next provider visit (if known/scheduled): to schedule as needed  Medical Diagnosis (from order):  History of ankle surgery [Z98.890]  Right ankle pain, unspecified chronicity [M25.571]  Treatment Diagnosis: right ankle pain symptoms with increased pain/symptoms, impaired strength, impaired range of motion, increased swelling, impaired joint mobility/play, impaired gait, impaired activity tolerance    Date of Surgery: 9/18/2018 Surgery Performed: RIGHT DISTAL TIBIA  OPEN REDUCTION INTERNAL FIXATION WITH ALLOGRAFT - Right  Physician Guidelines/Precautions: Eval and treat, strengthening, gait training, no lifting greater than 20 lbs, slowly progress activities as tolerated   Chart reviewed at time of initial evaluation (relevant co-morbidities, allergies, tests and medications listed): Right ankle ORIF 5/29/18, then second surgery on 9/18/19  Substance abuse - h/o daily alcohol use stopped 8/4/17; Polymyalgia rheumatica; OA; Gout; HTN        SUBJECTIVE   Description of Problem and/or Mechanism of Injury: Patient presents following his third right ankle surgery on 9/18/18 to repair right distal tibial nonunion with medial opening wedge osteoplasty with allograft. This  surgery was following previous surgeries on 5/29/18 and 3/8/18 on the same ankle following a fracture from falling down stairs on 2/27/18.    Patient was non-weight bearing for approximately 5 months then was placed in a walking cast for about 5 weeks and about two weeks ago had the cast off and was given the ok to be WBAT at this time. Patient currently states he can only tolerate standing on his feet for about 15 minutes at this time. Patient states in total he can only tolerate being on his feet for about 1.5 hours in total for a full day.   Patient reports he is having a lot of pain in the plantar aspect of the right foot at this time.   Pain:  Intensity (0-10 scale): Current: 1; Pain Range (best-worse): 1-9  Location: right ankle  Quality/Description: Sharp  Relieving/Alleviating factors: rest    Function:  Limitations and exacerbating factors (patient reported): pain, difficulty with all weight bearing activities/tasks with involved extremity  Prior level (patient reported): increasing pain and difficulty with function, therefore underwent surgery to regain function  Patient Goals: return to prior level of function which was no pain with weight bearing activities.      Prior Treatment: outpatient physical therapy in the past year for current condition. Hospitalization, home health services or skilled nursing facility in the last 30 days: No, per patient.  Home Environment/Social Support: Patient lives with significant other; intermittent assist from family/friends available    Safety:  Do you feel safe at home, work and/or school? yes, per patient  Patient denies 2 or more falls or an unexplained fall with injury in the last year, further testing not required     OBJECTIVE   Posture/Observation:  Right lower leg is red with a couple areas of that are open that patient states he bumped into something. Incisions otherwise well healed at this time.     Gait Analysis:  Using standard cane; Patient ambulates in a  significantly altered gait pattern with right lower extremity externally rotated and right LE abducted. Decreased weight bearing right during stance phase.       Range of Motion (degrees)   Left Right   Date Initial Initial   Hip Flexion (110-120)     Hip Extension (10-15)     Hip Abduction (30-50)     Hip Adduction (30)     Hip Internal Rotation (30-40)     Hip External Rotation (40-60)     Knee Flexion (135)     Knee Extension (0-5)     Ankle Dorsiflexion (20) 6 -5   Ankle Plantar Flexion (50) 44 25   Ankle Inversion (35) 11 12   Ankle Eversion (15) 22 12   Reported in degrees, active range of motion recorded unless noted as AA=active assistive or P=passive; standard testing positions unless otherwise noted, norms included in ( ); *=pain   Only those motions that were assessed are noted.    Strength: (out of 5)   Left Right   Date Initial Initial   Hip Flexors     Hip Extensors     Hip Abductors     Hip Adductors     Hip Internal Rotators     Hip External Rotators     Knee Flexors     Knee Extensors     Ankle Dorsiflexors 5 3+*   Ankle Plantar Flexors 5 2+*   Ankle Invertors 5 4*   Ankle Evertors 5 4*   standard testing positions unless otherwise noted; *=pain  Only muscle strength that was assessed are noted.    Ankle Circumference: (cm)    Left Right   Figure Eight 58.0 60.9   Metatarsal Heads 23.5 23.5   Navicular 26.0 26.0   [place]  28.0 31.5   Comments: Significant swelling is present in the right LE.    Single leg stance:  Left : 4 seconds  Right: unable    Palpation:  Pain with palpation of the right plantar fascia. Pain also with palpation of the medial aspect of the distal right tibia    Jamil's sign: Negative right, indication for deep vein thrombosis     Outcome Measures:   Lower Extremity Functional Scale: LEFS Calculated Total: 18 (0=extreme difficulty; 80=no difficulty)     Initial Treatment   Initial evaluation completed.    Therapeutic Exercise: instruction in and completion of:  Exercise  Repetitions Sets Position/Cues   4 way ankle strengthening with red band* 10 1    Long sitting towel stretch*  20 sec 3                                              Comments: issued written instructions      Skilled input: verbal instruction/cues    Initial Home Program:  * above=instructed home program        Writer verbally educated the patient and received verbal consent from the patient on hand placement, positioning of patient, and techniques to be performed today including hand placement and palpation for techniques as described above and how they are pertinent to the patient's plan of care.      Suggestions for next session as indicated: progress per plan of care, review home exercise program, progress weight bearing as tolerated, strengthen and stretch as tolerated.    ASSESSMENT   61 year old male patient has signs and symptoms consistent with s/p right ankle surgery for ORIF with allograft wedge to tibia on 9/18/18 that has reported functional limitations listed above. This is the third surgery on the right ankle following a fall and resultant fracture that happened on 2/27/18. Prior surgeries were on 3/8/18 and 5/29/18. Patient was non-weight bearing for approximately 5 months, then walking cast for 5 weeks and is now WBAT. Patient demonstrates decreased range of motion, strength, balance, altered gait mechanics and significant swelling.      Patient will benefit from skilled therapy and rehab potential is fair due to positive factors motivation level and negative factors multiple surgeries   Predicted patient presentation: Moderate (evolving) - Patient comorbidities and complexities, as defined above, may have varying impact on steady progress for prescribed plan of care.    PLAN   Goals: To be obtained by end of this plan of care:  1. Patient will be independent with progressed and modified home exercise program  2. Decrease pain/symptoms to 3/10  3. Improve involved strength to 4/5  4. Improve involved  range of motion to WFL's  through improvements listed above patient will:  5. Be able to ambulate community distances on even and uneven terrain without assistive device  6. Be able to ambulate for 30 minutes with minimal pain/difficulty  7. Be able to stand for 30 minutes with minimal pain/difficulty to improve activities of independent daily living  8. Lower Extremity Functional Scale: Patient will complete form to reflect an improved score from initial score of 18 to greater than or equal to 40 to indicate patient reported improvement in function/disability/impairment (minimal detectable change: 9 points).     The following skilled interventions to be implemented to achieve above:  Activities of Daily Living/Self Care (37450)  Gait Training (86000)  Manual Therapy (97176)  Neuromuscular Re-Education (48764)  Therapeutic Activity (02410)  Therapeutic Exercise (14919)  Electrical Stimulation (62854//64948)   Heat/Cold (06549)  Vasopneumatic Device (98615)    Frequency/Duration: 2 times per week for 12 weeks with tapering as the patient progresses for an estimated total of 24 visits    patient involved in and agreed to plan of care and goals.   Attendance policy provided at time of evaluation.      Patient Education:  Who will be receiving education: patient  Are they ready to learn: yes  Preferred learning style: written, verbal, demonstration  Barriers to learning: no barriers apparent at this time   Result of initial outlined education: Verbalizes understanding and Needs reinforcement    THERAPY DAILY BILLING   Insurance: Repair Report 2. N/A    Evaluation Procedures:  Physical Therapy Evaluation: Moderate Complexity    Timed Procedures:  Therapeutic Exercise, 25 minutes    Untimed Procedures:  No untimed codes were used on this date of service    Total Treatment Time: 45 minutes    The referring provider's electronic or written signature on the evaluation authorizes the therapy plan of care.   Electronically sent  for referring provider signature   no

## 2023-07-22 NOTE — PROGRESS NOTE ADULT - SUBJECTIVE AND OBJECTIVE BOX
History of Present Illness:   67-year-old male w/ pmhx of  CAD with (10 stents last one placed in 2019) HTN, A-fib(on Eliquis), HLD presenting with a chief complaint of chest pain.  Patient was waking up sleep with midsternal chest pain.  Patient reports that he had diaphoresis at this time.  Patient reports pain that is radiating to bilateral arms.  The patient denies any nausea or vomiting.  Patient states that he has had an MI back in 2007. Pt also reports shortness of breath. The patient took nitroglycerin metoprolol at home for symptoms. The patient denies nausea, vomiting, headache, visual changes, abdominal pain, numbness.  Transferred to NS for LHC s/p LHC POBA to RCA     Physical Exam:  Appearance: Normal, NAD  HEAD:  Atraumatic, Normocephalic  EYES: EOMI, PERRLA, conjunctiva and sclera clear  NECK: Supple, No JVD  CHEST/LUNG: Clear to auscultation bilaterally; No wheezing  HEART: Normal S1, S2. No murmurs, rubs, or gallops  ABDOMEN: + Bowel sounds, Soft, NT, ND   EXTREMITIES:  2+ Peripheral Pulses, No clubbing, cyanosis, or edema  NEUROLOGY: non-focal, aaox3  Lymphatic: No lymphadenopathy  SKIN: No rashes or lesions    Patient History:   Past Medical, Past Surgical, and Family History:  PAST MEDICAL HISTORY:  Ankle fracture     Asthma     Atrial fibrillation On Xarelto    H/O heart artery stent 2008, 2012    HTN (hypertension)     Hypercholesteremia     Melanoma     MI (myocardial infarction) 2008.     PAST SURGICAL HISTORY:  History of coronary artery stent placement 2007, 2012, 2013    Melanoma removal    No Past Surgical History     S/P angioplasty with stent.    Assessment and Plan:    Assessment:    68 y/o M w/ a significant cardiac hx of CAD (s/p 10 Stents, Last one 2019), HTN, HLD, AFIB (on Eliquis) admitted w/ STEMI s/p C POBA to RCA    Plan:  ======================= Neuro =======================   aaox 3 No active issues  - Neuro assessment as per ICU protocol    =======================  Pulm =======================   Denies SOB or dyspnea   O 2 sat >92% on room air   - Oxygen supplement as needed     =======================  Cardiovascular =======================   admitted w/ STEMI s/p POBA to RCA  s/p Asprin and Brilinta loaded  - Cont. ASA and Brilinta  - was on Crestor 40 and now on Lipitor 80mg HS  - Will be on triple therapy but at this point, continue aspirin, plavix and Hep gtt   - Follow up w/ Dr. Camacho Monday   - GDMT  - Trend CE  -  Echo pending   - Risk stratification: TSH, Lipid and A1c    ======================= GI =======================   - DASH diet    ======================= RENAL cR 0.9-1.0 =======================   - Monitor U/O, BUN/Cr, and lytes  - Maintain K+>4.0 mag >2.0    ======================= ID =======================   Pt is afebrile and no leukocytosis  No need for abx at this point  - Trend WBC      History of Present Illness:   67-year-old male w/ pmhx of  CAD with (10 stents last one placed in 2019) HTN, A-fib(on Eliquis), HLD presenting with a chief complaint of chest pain.  Patient was waking up sleep with midsternal chest pain.  Patient reports that he had diaphoresis at this time.  Patient reports pain that is radiating to bilateral arms.  The patient denies any nausea or vomiting.  Patient states that he has had an MI back in 2007. Pt also reports shortness of breath. The patient took nitroglycerin metoprolol at home for symptoms. The patient denies nausea, vomiting, headache, visual changes, abdominal pain, numbness.  Transferred to NS for LHC s/p LHC POBA to RCA     Physical Exam:  Appearance: Normal, NAD  HEAD:  Atraumatic, Normocephalic  EYES: EOMI, PERRLA, conjunctiva and sclera clear  NECK: Supple, No JVD  CHEST/LUNG: Clear to auscultation bilaterally; No wheezing  HEART: Normal S1, S2. No murmurs, rubs, or gallops  ABDOMEN: + Bowel sounds, Soft, NT, ND   EXTREMITIES:  2+ Peripheral Pulses, No clubbing, cyanosis, or edema  NEUROLOGY: non-focal, aaox3  Lymphatic: No lymphadenopathy  SKIN: No rashes or lesions    Patient History:   Past Medical, Past Surgical, and Family History:  PAST MEDICAL HISTORY:  Ankle fracture     Asthma     Atrial fibrillation On Xarelto    H/O heart artery stent 2008, 2012    HTN (hypertension)     Hypercholesteremia     Melanoma     MI (myocardial infarction) 2008.     PAST SURGICAL HISTORY:  History of coronary artery stent placement 2007, 2012, 2013    Melanoma removal    No Past Surgical History     S/P angioplasty with stent.    Assessment and Plan:    Assessment:    66 y/o M w/ a significant cardiac hx of CAD (s/p 10 Stents, Last one 2019), HTN, HLD, AFIB (on Eliquis) admitted w/ STEMI s/p C POBA to RCA    Plan:  ======================= Neuro =======================   aaox 3 No active issues  - Neuro assessment as per ICU protocol    =======================  Pulm =======================   Denies SOB or dyspnea   O 2 sat >92% on room air   - Oxygen supplement as needed     =======================  Cardiovascular =======================   admitted w/ STEMI s/p POBA to RCA  s/p Asprin and Brilinta loaded  - Cont. ASA and Brilinta  - was on Crestor 40 and now on Lipitor 80mg HS  - Will be on triple therapy but at this point, continue aspirin, plavix and Hep gtt   - Follow up w/ Dr. Camacho Monday   - GDMT  - Trend CE  -  Echo pending   - Risk stratification: TSH, Lipid and A1c    ======================= GI =======================   - DASH diet    ======================= RENAL cR 0.9-1.0 =======================   - Monitor U/O, BUN/Cr, and lytes  - Maintain K+>4.0 mag >2.0    ======================= ID =======================   Pt is afebrile and no leukocytosis  No need for abx at this point  - Trend WBC     Patient requires continuous monitoring with bedside rhythm monitoring, pulse ox monitoring, and intermittent blood gas analysis. Care plan discussed with ICU care team. Patient remained critical and at risk for life threatening decompensation.  Patient seen, examined and plan discussed with CCU team during rounds.     I have personally provided ____ minutes of critical care time excluding time spent on separate procedures, in addition to initial critical care time provided by the CICU Attending, Dr. Cisneros.     By signing my name below, I, Morelia Tovar, attest that this documentation has been prepared under the direction and in the presence of JONNY Cleveland  Electronically signed: Tenisha Gee, 07-22-23 @ 21:10    I, Len Rodriguez, personally performed the services described in this documentation. all medical record entries made by the scribe were at my direction and in my presence. I have reviewed the chart and agree that the record reflects my personal performance and is accurate and complete  Electronically signed: JONNY Cleveland   History of Present Illness:   67-year-old male w/ pmhx of  CAD with (10 stents last one placed in 2019) HTN, A-fib(on Eliquis), HLD presenting with a chief complaint of chest pain.  Patient was waking up sleep with midsternal chest pain.  Patient reports that he had diaphoresis at this time.  Patient reports pain that is radiating to bilateral arms.  The patient denies any nausea or vomiting.  Patient states that he has had an MI back in 2007. Pt also reports shortness of breath. The patient took nitroglycerin metoprolol at home for symptoms. The patient denies nausea, vomiting, headache, visual changes, abdominal pain, numbness.  Transferred to NS for C s/p [facility] POBA to RCA     Patient History:   Past Medical, Past Surgical, and Family History:  PAST MEDICAL HISTORY:  Ankle fracture     Asthma     Atrial fibrillation On Xarelto    H/O heart artery stent 2008, 2012    HTN (hypertension)     Hypercholesteremia     Melanoma     MI (myocardial infarction) 2008.     PAST SURGICAL HISTORY:  History of coronary artery stent placement 2007, 2012, 2013    Melanoma removal    No Past Surgical History     S/P angioplasty with stent.    Assessment and Plan:    Assessment:    68 y/o M w/ a significant cardiac hx of CAD (s/p 10 Stents, Last one 2019), HTN, HLD, AFIB (on Eliquis) admitted w/ STEMI s/p C POBA to RCA    Plan:  ======================= Neuro =======================   aaox 3 No active issues  - Neuro assessment as per ICU protocol    =======================  Pulm =======================   Denies SOB or dyspnea   O 2 sat >92% on room air   - Oxygen supplement as needed     =======================  Cardiovascular =======================   #STEMI s/p POBA to RCA  7/22 TTE: EF 47% The basal and mid inferior wall and basal and mid inferolateral walls are akinetic   s/p Asprin and Brilinta loaded  - Triple therapy, pt plavix loaded  - Cont. ASA, plavix and heparin  - was on Crestor 40 and now on Lipitor 80mg HS  - Follow up w/ Dr. Camacho Monday   - GDMT  - coreg 3.125 initiated  - losartan to begin tomorrow  - CE peaked, trend lactate down  - Risk stratification: TSH, Lipid and A1c    ======================= GI =======================   - DASH diet    ======================= RENAL cR 0.9-1.0 =======================   - Monitor U/O, BUN/Cr, and lytes  - Maintain K+>4.0 mag >2.0    ======================= ID =======================   Pt is afebrile and no leukocytosis  No need for abx at this point  - Trend WBC     Patient requires continuous monitoring with bedside rhythm monitoring, pulse ox monitoring, and intermittent blood gas analysis. Care plan discussed with ICU care team. Patient remained critical and at risk for life threatening decompensation.  Patient seen, examined and plan discussed with CCU team during rounds.     I have personally provided 35 minutes of critical care time excluding time spent on separate procedures, in addition to initial critical care time provided by the CICU Attending, Dr. Cisneros.     By signing my name below, I, Morelia Tovar, attest that this documentation has been prepared under the direction and in the presence of JONNY Cleveland  Electronically signed: Tenisha Gee, 07-22-23 @ 21:10    I, Len Rodriguez, personally performed the services described in this documentation. all medical record entries made by the scribe were at my direction and in my presence. I have reviewed the chart and agree that the record reflects my personal performance and is accurate and complete  Electronically signed: JONNY [place]

## 2023-07-22 NOTE — H&P ADULT - NSHPREVIEWOFSYSTEMS_GEN_ALL_CORE
CONSTITUTIONAL: No weakness, fevers or chills  EYES/ENT: No visual changes;  No vertigo or throat pain   NECK: No pain or stiffness  RESPIRATORY: No cough, wheezing, hemoptysis; No shortness of breath  CARDIOVASCULAR: See HPI  GASTROINTESTINAL: No abdominal or epigastric pain. No nausea, vomiting, or hematemesis; No diarrhea or constipation. No melena or hematochezia.  GENITOURINARY: No dysuria, frequency or hematuria  NEUROLOGICAL: No numbness or weakness  SKIN: No itching, rashes  All other negative

## 2023-07-22 NOTE — H&P ADULT - NSHPPHYSICALEXAM_GEN_ALL_CORE
Appearance: Normal, NAD  HEAD:  Atraumatic, Normocephalic  EYES: EOMI, PERRLA, conjunctiva and sclera clear  NECK: Supple, No JVD  CHEST/LUNG: Clear to auscultation bilaterally; No wheezing  HEART: Normal S1, S2. No murmurs, rubs, or gallops  ABDOMEN: + Bowel sounds, Soft, NT, ND   EXTREMITIES:  2+ Peripheral Pulses, No clubbing, cyanosis, or edema  NEUROLOGY: non-focal, aaox3  Lymphatic: No lymphadenopathy  SKIN: No rashes or lesions

## 2023-07-22 NOTE — H&P ADULT - HISTORY OF PRESENT ILLNESS
67-year-old male w/ pmhx of  CAD with (10 stents last one placed in 2019) HTN, A-fib(on Eliquis), HLD presenting with a chief complaint of chest pain.  Patient was waking up sleep with midsternal chest pain.  Patient reports that he had diaphoresis at this time.  Patient reports pain that is radiating to bilateral arms.  The patient denies any nausea or vomiting.  Patient states that he has had an MI back in 2007. Pt also reports shortness of breath. The patient took nitroglycerin metoprolol at home for symptoms. The patient denies nausea, vomiting, headache, visual changes, abdominal pain, numbness.  Transferred to NS for LHC s/p LHC POBA to RCA

## 2023-07-22 NOTE — CHART NOTE - NSCHARTNOTEFT_GEN_A_CORE
Removal of Radial Band    Pulses in the right upper extremity are palpable. The patient was placed in the supine position. The insertion site was identified and the band deflated per protocol. The radial band was removed slowly. Direct pressure was applied for  _30_____ minutes/not applicable.      Monitoring of the right wrists and both upper extremities including neuro-vascular checks and vital signs every 15 minutes x 4.    Complications: None    Comments: pressure dressing applied

## 2023-07-22 NOTE — ED PROVIDER NOTE - OBJECTIVE STATEMENT
67-year-old male history of CAD with 10 stents last one placed in 2019, HTN, A-fib, HLD presenting with a chief complaint of chest pain.  Patient was waking up sleep with midsternal chest pain.  Patient reports that he had diaphoresis at this time.  Patient reports pain that is radiating to bilateral arms.  The patient denies any nausea or vomiting.  Patient states that he has had an MI back in 2007. Pt also reports shortness of breath. The patient took nitroglycerin metoprolol at home for symptoms.  The patient denies nausea, vomiting, headache, visual changes, abdominal pain, numbness.

## 2023-07-22 NOTE — PATIENT PROFILE ADULT - NSTOBACCOWITHDRW_GEN_A_CORE_SD
none 69 yr old F 69 yr old F w/a PMH of HTN and Rheumatoid Arthritis, presented with intermittent atypical chest pain found to have new t wave inversions, s/p cardiac cath, clean coronaries. c/w ASA, toprol and lipitor. Hx of Rheumatoid arthritis involving multiple sites, c/w Enbrel. pt is stable to d/c home.

## 2023-07-23 ENCOUNTER — TRANSCRIPTION ENCOUNTER (OUTPATIENT)
Age: 68
End: 2023-07-23

## 2023-07-23 LAB
ALBUMIN SERPL ELPH-MCNC: 3.8 G/DL — SIGNIFICANT CHANGE UP (ref 3.3–5)
ALP SERPL-CCNC: 93 U/L — SIGNIFICANT CHANGE UP (ref 40–120)
ALT FLD-CCNC: 28 U/L — SIGNIFICANT CHANGE UP (ref 10–45)
ANION GAP SERPL CALC-SCNC: 13 MMOL/L — SIGNIFICANT CHANGE UP (ref 5–17)
APTT BLD: 43.8 SEC — HIGH (ref 27.5–35.5)
APTT BLD: 55.6 SEC — HIGH (ref 27.5–35.5)
APTT BLD: 61.2 SEC — HIGH (ref 27.5–35.5)
APTT BLD: 63.1 SEC — HIGH (ref 27.5–35.5)
AST SERPL-CCNC: 67 U/L — HIGH (ref 10–40)
BILIRUB SERPL-MCNC: 0.7 MG/DL — SIGNIFICANT CHANGE UP (ref 0.2–1.2)
BUN SERPL-MCNC: 21 MG/DL — SIGNIFICANT CHANGE UP (ref 7–23)
CALCIUM SERPL-MCNC: 9.1 MG/DL — SIGNIFICANT CHANGE UP (ref 8.4–10.5)
CHLORIDE SERPL-SCNC: 104 MMOL/L — SIGNIFICANT CHANGE UP (ref 96–108)
CHOLEST SERPL-MCNC: 195 MG/DL — SIGNIFICANT CHANGE UP
CO2 SERPL-SCNC: 23 MMOL/L — SIGNIFICANT CHANGE UP (ref 22–31)
CREAT SERPL-MCNC: 1.04 MG/DL — SIGNIFICANT CHANGE UP (ref 0.5–1.3)
EGFR: 79 ML/MIN/1.73M2 — SIGNIFICANT CHANGE UP
GLUCOSE SERPL-MCNC: 123 MG/DL — HIGH (ref 70–99)
HCT VFR BLD CALC: 47.7 % — SIGNIFICANT CHANGE UP (ref 39–50)
HDLC SERPL-MCNC: 38 MG/DL — LOW
HGB BLD-MCNC: 15.8 G/DL — SIGNIFICANT CHANGE UP (ref 13–17)
INR BLD: 1.08 RATIO — SIGNIFICANT CHANGE UP (ref 0.88–1.16)
LACTATE SERPL-SCNC: 2.1 MMOL/L — HIGH (ref 0.5–2)
LACTATE SERPL-SCNC: 2.5 MMOL/L — HIGH (ref 0.5–2)
LACTATE SERPL-SCNC: 3.1 MMOL/L — HIGH (ref 0.5–2)
LIPID PNL WITH DIRECT LDL SERPL: 127 MG/DL — HIGH
MAGNESIUM SERPL-MCNC: 2.4 MG/DL — SIGNIFICANT CHANGE UP (ref 1.6–2.6)
MCHC RBC-ENTMCNC: 29.7 PG — SIGNIFICANT CHANGE UP (ref 27–34)
MCHC RBC-ENTMCNC: 33.1 GM/DL — SIGNIFICANT CHANGE UP (ref 32–36)
MCV RBC AUTO: 89.7 FL — SIGNIFICANT CHANGE UP (ref 80–100)
NON HDL CHOLESTEROL: 157 MG/DL — HIGH
NRBC # BLD: 0 /100 WBCS — SIGNIFICANT CHANGE UP (ref 0–0)
PHOSPHATE SERPL-MCNC: 3 MG/DL — SIGNIFICANT CHANGE UP (ref 2.5–4.5)
PLATELET # BLD AUTO: 169 K/UL — SIGNIFICANT CHANGE UP (ref 150–400)
POTASSIUM SERPL-MCNC: 3.6 MMOL/L — SIGNIFICANT CHANGE UP (ref 3.5–5.3)
POTASSIUM SERPL-SCNC: 3.6 MMOL/L — SIGNIFICANT CHANGE UP (ref 3.5–5.3)
PROT SERPL-MCNC: 6.5 G/DL — SIGNIFICANT CHANGE UP (ref 6–8.3)
PROTHROM AB SERPL-ACNC: 12.4 SEC — SIGNIFICANT CHANGE UP (ref 10.5–13.4)
RBC # BLD: 5.32 M/UL — SIGNIFICANT CHANGE UP (ref 4.2–5.8)
RBC # FLD: 13.2 % — SIGNIFICANT CHANGE UP (ref 10.3–14.5)
SODIUM SERPL-SCNC: 140 MMOL/L — SIGNIFICANT CHANGE UP (ref 135–145)
TRIGL SERPL-MCNC: 168 MG/DL — HIGH
WBC # BLD: 9.94 K/UL — SIGNIFICANT CHANGE UP (ref 3.8–10.5)
WBC # FLD AUTO: 9.94 K/UL — SIGNIFICANT CHANGE UP (ref 3.8–10.5)

## 2023-07-23 PROCEDURE — 99291 CRITICAL CARE FIRST HOUR: CPT

## 2023-07-23 PROCEDURE — 99238 HOSP IP/OBS DSCHRG MGMT 30/<: CPT

## 2023-07-23 PROCEDURE — 93010 ELECTROCARDIOGRAM REPORT: CPT

## 2023-07-23 RX ORDER — SODIUM CHLORIDE 9 MG/ML
250 INJECTION, SOLUTION INTRAVENOUS ONCE
Refills: 0 | Status: COMPLETED | OUTPATIENT
Start: 2023-07-23 | End: 2023-07-23

## 2023-07-23 RX ORDER — VALSARTAN 80 MG/1
80 TABLET ORAL DAILY
Refills: 0 | Status: DISCONTINUED | OUTPATIENT
Start: 2023-07-24 | End: 2023-07-25

## 2023-07-23 RX ORDER — SODIUM CHLORIDE 9 MG/ML
250 INJECTION INTRAMUSCULAR; INTRAVENOUS; SUBCUTANEOUS ONCE
Refills: 0 | Status: COMPLETED | OUTPATIENT
Start: 2023-07-23 | End: 2023-07-23

## 2023-07-23 RX ORDER — POTASSIUM CHLORIDE 20 MEQ
40 PACKET (EA) ORAL ONCE
Refills: 0 | Status: COMPLETED | OUTPATIENT
Start: 2023-07-23 | End: 2023-07-23

## 2023-07-23 RX ORDER — VALSARTAN 80 MG/1
40 TABLET ORAL ONCE
Refills: 0 | Status: COMPLETED | OUTPATIENT
Start: 2023-07-23 | End: 2023-07-23

## 2023-07-23 RX ORDER — CARVEDILOL PHOSPHATE 80 MG/1
3.12 CAPSULE, EXTENDED RELEASE ORAL EVERY 12 HOURS
Refills: 0 | Status: DISCONTINUED | OUTPATIENT
Start: 2023-07-23 | End: 2023-07-25

## 2023-07-23 RX ADMIN — VALSARTAN 40 MILLIGRAM(S): 80 TABLET ORAL at 13:36

## 2023-07-23 RX ADMIN — SODIUM CHLORIDE 250 MILLILITER(S): 9 INJECTION INTRAMUSCULAR; INTRAVENOUS; SUBCUTANEOUS at 02:03

## 2023-07-23 RX ADMIN — CARVEDILOL PHOSPHATE 3.12 MILLIGRAM(S): 80 CAPSULE, EXTENDED RELEASE ORAL at 02:01

## 2023-07-23 RX ADMIN — CARVEDILOL PHOSPHATE 3.12 MILLIGRAM(S): 80 CAPSULE, EXTENDED RELEASE ORAL at 17:40

## 2023-07-23 RX ADMIN — FINASTERIDE 5 MILLIGRAM(S): 5 TABLET, FILM COATED ORAL at 12:29

## 2023-07-23 RX ADMIN — SODIUM CHLORIDE 500 MILLILITER(S): 9 INJECTION, SOLUTION INTRAVENOUS at 08:34

## 2023-07-23 RX ADMIN — HEPARIN SODIUM 13 UNIT(S)/HR: 5000 INJECTION INTRAVENOUS; SUBCUTANEOUS at 22:29

## 2023-07-23 RX ADMIN — CLOPIDOGREL BISULFATE 75 MILLIGRAM(S): 75 TABLET, FILM COATED ORAL at 12:29

## 2023-07-23 RX ADMIN — LOSARTAN POTASSIUM 12.5 MILLIGRAM(S): 100 TABLET, FILM COATED ORAL at 05:12

## 2023-07-23 RX ADMIN — HEPARIN SODIUM 13 UNIT(S)/HR: 5000 INJECTION INTRAVENOUS; SUBCUTANEOUS at 18:02

## 2023-07-23 RX ADMIN — ATORVASTATIN CALCIUM 80 MILLIGRAM(S): 80 TABLET, FILM COATED ORAL at 21:57

## 2023-07-23 RX ADMIN — Medication 81 MILLIGRAM(S): at 12:28

## 2023-07-23 RX ADMIN — Medication 40 MILLIEQUIVALENT(S): at 02:02

## 2023-07-23 RX ADMIN — HEPARIN SODIUM 13 UNIT(S)/HR: 5000 INJECTION INTRAVENOUS; SUBCUTANEOUS at 08:35

## 2023-07-23 RX ADMIN — HEPARIN SODIUM 12 UNIT(S)/HR: 5000 INJECTION INTRAVENOUS; SUBCUTANEOUS at 01:36

## 2023-07-23 RX ADMIN — HEPARIN SODIUM 13 UNIT(S)/HR: 5000 INJECTION INTRAVENOUS; SUBCUTANEOUS at 14:05

## 2023-07-23 NOTE — DISCHARGE NOTE PROVIDER - NSDCFUSCHEDAPPT_GEN_ALL_CORE_FT
Anish Colón  [facility] Physician [facility]  CARDIOLOGY [address] [facility]   Scheduled Appointment: 07/25/2023

## 2023-07-23 NOTE — CHART NOTE - NSCHARTNOTEFT_GEN_A_CORE
CCU Transfer Note    Transfer from: CCU    Transfer to: ( x ) Telemetry --    Accepting Physician:    Team covering on floor: ACP/Resident team   Signout given to: Hospitalist and     CCU COURSE:  67-year-old male w/ pmhx of  CAD with (10 stents last one placed in 2019) HTN, A-fib(on Eliquis), HLD presenting with a chief complaint of chest pain.  Patient was waking up sleep with midsternal chest pain.  Patient reports that he had diaphoresis at this time.  Patient reports pain that is radiating to bilateral arms.  The patient denies any nausea or vomiting.  Patient states that he has had an MI back in 2007. Pt also reports shortness of breath. The patient took nitroglycerin metoprolol at home for symptoms. The patient denies nausea, vomiting, headache, visual changes, abdominal pain, numbness.  Transferred to NS for LHC s/p [facility] POBA to RCA     PAST MEDICAL & SURGICAL HISTORY:  Hypercholesteremia      HTN (hypertension)      H/O heart artery stent  2008, 2012      Asthma      MI (myocardial infarction)  2008      Melanoma      Ankle fracture      Atrial fibrillation  On Xarelto      No Past Surgical History      S/P angioplasty with stent      Melanoma  removal      History of coronary artery stent placement  2007, 2012, 2013          Vital Signs Last 24 Hrs  T(C): 36.6 (22 Jul 2023 23:00), Max: 36.7 (22 Jul 2023 19:00)  T(F): 97.9 (22 Jul 2023 23:00), Max: 98.1 (22 Jul 2023 19:00)  HR: 65 (23 Jul 2023 05:00) (55 - 143)  BP: 123/74 (23 Jul 2023 05:00) (105/59 - 156/76)  BP(mean): 93 (23 Jul 2023 05:00) (76 - 111)  RR: 16 (23 Jul 2023 05:00) (7 - 25)  SpO2: 95% (23 Jul 2023 05:00) (91% - 98%)    Parameters below as of 23 Jul 2023 05:00  Patient On (Oxygen Delivery Method): room air      MEDICATIONS  (STANDING):  aspirin  chewable 81 milliGRAM(s) Oral daily  atorvastatin 80 milliGRAM(s) Oral at bedtime  carvedilol 3.125 milliGRAM(s) Oral every 12 hours  chlorhexidine 2% Cloths 1 Application(s) Topical at bedtime  chlorhexidine 4% Liquid 1 Application(s) Topical <User Schedule>  clopidogrel Tablet 75 milliGRAM(s) Oral daily  finasteride 5 milliGRAM(s) Oral daily  heparin  Infusion 1000 Unit(s)/Hr (12 mL/Hr) IV Continuous <Continuous>  losartan 12.5 milliGRAM(s) Oral daily    MEDICATIONS  (PRN):        CARDIAC MARKERS ( 22 Jul 2023 20:50 )  x     / x     / 381 U/L / x     / 62.2 ng/mL  CARDIAC MARKERS ( 22 Jul 2023 13:44 )  x     / x     / 389 U/L / x     / 72.5 ng/mL                            15.8   9.94  )-----------( 169      ( 23 Jul 2023 01:11 )             47.7     07-23    140  |  104  |  21  ----------------------------<  123<H>  3.6   |  23  |  1.04    Ca    9.1      23 Jul 2023 01:12  Phos  3.0     07-23  Mg     2.4     07-23    TPro  6.5  /  Alb  3.8  /  TBili  0.7  /  DBili  x   /  AST  67<H>  /  ALT  28  /  AlkPhos  93  07-23    PT/INR - ( 23 Jul 2023 01:12 )   PT: 12.4 sec;   INR: 1.08 ratio         PTT - ( 23 Jul 2023 01:12 )  PTT:43.8 sec      ASSESSMENT & PLAN:   68 y/o M w/ a significant cardiac hx of CAD (s/p 10 Stents, Last one 2019), HTN, HLD, AFIB (on Eliquis) admitted w/ STEMI s/p C POBA to RCA    Plan:  ======================= Neuro =======================   aaox 3 No active issues  - Neuro assessment as per ICU protocol    =======================  Pulm =======================   Denies SOB or dyspnea   O 2 sat >92% on room air   - Oxygen supplement as needed     =======================  Cardiovascular =======================   #STEMI s/p POBA to RCA  7/22 LHC: POBA to RCA. 80% pCX, 40% in-stent stenosis pLAD, 60% in-stent stenosis of ostium portion of LAD.  7/22 TTE: EF 47% The basal and mid inferior wall and basal and mid inferolateral walls are akinetic   s/p Asprin and Brilinta loaded  - Triple therapy, pt plavix loaded  - Cont. ASA, plavix and heparin  - was on Crestor 40 and now on Lipitor 80mg HS  - Follow up w/ Dr. Camacho Monday   - GDMT  - coreg 3.125 initiated  - losartan to begin tomorrow  - CE peaked, trend lactate down  - Risk stratification: TSH, Lipid and A1c    ======================= GI =======================   - DASH diet    ======================= RENAL cR 0.9-1.0 =======================   - Monitor U/O, BUN/Cr, and lytes  - Maintain K+>4.0 mag >2.0    ======================= ID =======================   Pt is afebrile and no leukocytosis  No need for abx at this point  - Trend WBC       FOR FOLLOW UP:  -Initiate GDMT, titrate BB, losartan  -transition heparin to PO AC  -f/u with Dr. Polo Monday      Len Rodriguez, CCU PA CCU Transfer Note    Transfer from: CCU    Transfer to: ( x ) Telemetry --    Accepting Physician:    Team covering on floor: ACP/Resident team   Signout given to: Hospitalist and     CCU COURSE:  67-year-old male w/ pmhx of  CAD with (10 stents last one placed in 2019) HTN, A-fib(on Eliquis), HLD presenting with a chief complaint of chest pain.  Patient was waking up sleep with midsternal chest pain.  Patient reports that he had diaphoresis at this time.  Patient reports pain that is radiating to bilateral arms.  The patient denies any nausea or vomiting.  Patient states that he has had an MI back in 2007. Pt also reports shortness of breath. The patient took nitroglycerin metoprolol at home for symptoms. The patient denies nausea, vomiting, headache, visual changes, abdominal pain, numbness.  Transferred to NS for LHC s/p LHC POBA to RCA and initiation of triple therapy and GDMT    PAST MEDICAL & SURGICAL HISTORY:  Hypercholesteremia      HTN (hypertension)      H/O heart artery stent  2008, 2012      Asthma      MI (myocardial infarction)  2008      Melanoma      Ankle fracture      Atrial fibrillation  On Xarelto      No Past Surgical History      S/P angioplasty with stent      Melanoma  removal      History of coronary artery stent placement  2007, 2012, 2013          Vital Signs Last 24 Hrs  T(C): 36.6 (22 Jul 2023 23:00), Max: 36.7 (22 Jul 2023 19:00)  T(F): 97.9 (22 Jul 2023 23:00), Max: 98.1 (22 Jul 2023 19:00)  HR: 65 (23 Jul 2023 05:00) (55 - 143)  BP: 123/74 (23 Jul 2023 05:00) (105/59 - 156/76)  BP(mean): 93 (23 Jul 2023 05:00) (76 - 111)  RR: 16 (23 Jul 2023 05:00) (7 - 25)  SpO2: 95% (23 Jul 2023 05:00) (91% - 98%)    Parameters below as of 23 Jul 2023 05:00  Patient On (Oxygen Delivery Method): room air      MEDICATIONS  (STANDING):  aspirin  chewable 81 milliGRAM(s) Oral daily  atorvastatin 80 milliGRAM(s) Oral at bedtime  carvedilol 3.125 milliGRAM(s) Oral every 12 hours  chlorhexidine 2% Cloths 1 Application(s) Topical at bedtime  chlorhexidine 4% Liquid 1 Application(s) Topical <User Schedule>  clopidogrel Tablet 75 milliGRAM(s) Oral daily  finasteride 5 milliGRAM(s) Oral daily  heparin  Infusion 1000 Unit(s)/Hr (12 mL/Hr) IV Continuous <Continuous>  losartan 12.5 milliGRAM(s) Oral daily    MEDICATIONS  (PRN):        CARDIAC MARKERS ( 22 Jul 2023 20:50 )  x     / x     / 381 U/L / x     / 62.2 ng/mL  CARDIAC MARKERS ( 22 Jul 2023 13:44 )  x     / x     / 389 U/L / x     / 72.5 ng/mL                            15.8   9.94  )-----------( 169      ( 23 Jul 2023 01:11 )             47.7     07-23    140  |  104  |  21  ----------------------------<  123<H>  3.6   |  23  |  1.04    Ca    9.1      23 Jul 2023 01:12  Phos  3.0     07-23  Mg     2.4     07-23    TPro  6.5  /  Alb  3.8  /  TBili  0.7  /  DBili  x   /  AST  67<H>  /  ALT  28  /  AlkPhos  93  07-23    PT/INR - ( 23 Jul 2023 01:12 )   PT: 12.4 sec;   INR: 1.08 ratio         PTT - ( 23 Jul 2023 01:12 )  PTT:43.8 sec      ASSESSMENT & PLAN:   66 y/o M w/ a significant cardiac hx of CAD (s/p 10 Stents, Last one 2019), HTN, HLD, AFIB (on Eliquis) admitted w/ STEMI s/p C POBA to RCA    Plan:  ======================= Neuro =======================   aaox 3 No active issues  - Neuro assessment as per ICU protocol    =======================  Pulm =======================   Denies SOB or dyspnea   O 2 sat >92% on room air   - Oxygen supplement as needed     =======================  Cardiovascular =======================   #STEMI s/p POBA to RCA  7/22 LHC: POBA to RCA. 80% pCX, 40% in-stent stenosis pLAD, 60% in-stent stenosis of ostium portion of LAD.  7/22 TTE: EF 47% The basal and mid inferior wall and basal and mid inferolateral walls are akinetic   s/p Asprin and Brilinta loaded  - Triple therapy, pt plavix loaded  - Cont. ASA, plavix and heparin  - was on Crestor 40 and now on Lipitor 80mg HS  - Follow up w/ Dr. Camacho Monday   - GDMT  - coreg 3.125 initiated  - losartan to begin tomorrow  - CE peaked, trend lactate down  - Risk stratification: TSH, Lipid and A1c    ======================= GI =======================   - DASH diet    ======================= RENAL cR 0.9-1.0 =======================   - Monitor U/O, BUN/Cr, and lytes  - Maintain K+>4.0 mag >2.0    ======================= ID =======================   Pt is afebrile and no leukocytosis  No need for abx at this point  - Trend WBC       FOR FOLLOW UP:  -Initiate GDMT, titrate BB, losartan  -transition heparin to PO AC  -f/u with Dr. Polo Monday      Len Rodriguez, CCU PA CCU Transfer Note    Transfer from: CCU    Transfer to: ( x ) Telemetry --    Accepting Physician:    Team covering on floor: ACP/Resident team   Signout given to: Hospitalist and     CCU COURSE:  67-year-old male w/ pmhx of  CAD with (10 stents last one placed in 2019) HTN, A-fib(on Eliquis), HLD presenting with a chief complaint of chest pain.  Patient was waking up sleep with midsternal chest pain.  Patient reports that he had diaphoresis at this time.  Patient reports pain that is radiating to bilateral arms.  The patient denies any nausea or vomiting.  Patient states that he has had an MI back in 2007. Pt also reports shortness of breath. The patient took nitroglycerin metoprolol at home for symptoms. The patient denies nausea, vomiting, headache, visual changes, abdominal pain, numbness.  Transferred to NS for LHC s/p LHC POBA to RCA and initiation of triple therapy and GDMT    PAST MEDICAL & SURGICAL HISTORY:  Hypercholesteremia      HTN (hypertension)      H/O heart artery stent  2008, 2012      Asthma      MI (myocardial infarction)  2008      Melanoma      Ankle fracture      Atrial fibrillation  On Xarelto      No Past Surgical History      S/P angioplasty with stent      Melanoma  removal      History of coronary artery stent placement  2007, 2012, 2013          Vital Signs Last 24 Hrs  T(C): 36.6 (22 Jul 2023 23:00), Max: 36.7 (22 Jul 2023 19:00)  T(F): 97.9 (22 Jul 2023 23:00), Max: 98.1 (22 Jul 2023 19:00)  HR: 65 (23 Jul 2023 05:00) (55 - 143)  BP: 123/74 (23 Jul 2023 05:00) (105/59 - 156/76)  BP(mean): 93 (23 Jul 2023 05:00) (76 - 111)  RR: 16 (23 Jul 2023 05:00) (7 - 25)  SpO2: 95% (23 Jul 2023 05:00) (91% - 98%)    Parameters below as of 23 Jul 2023 05:00  Patient On (Oxygen Delivery Method): room air      MEDICATIONS  (STANDING):  aspirin  chewable 81 milliGRAM(s) Oral daily  atorvastatin 80 milliGRAM(s) Oral at bedtime  carvedilol 3.125 milliGRAM(s) Oral every 12 hours  chlorhexidine 2% Cloths 1 Application(s) Topical at bedtime  chlorhexidine 4% Liquid 1 Application(s) Topical <User Schedule>  clopidogrel Tablet 75 milliGRAM(s) Oral daily  finasteride 5 milliGRAM(s) Oral daily  heparin  Infusion 1000 Unit(s)/Hr (12 mL/Hr) IV Continuous <Continuous>  losartan 12.5 milliGRAM(s) Oral daily    MEDICATIONS  (PRN):        CARDIAC MARKERS ( 22 Jul 2023 20:50 )  x     / x     / 381 U/L / x     / 62.2 ng/mL  CARDIAC MARKERS ( 22 Jul 2023 13:44 )  x     / x     / 389 U/L / x     / 72.5 ng/mL                            15.8   9.94  )-----------( 169      ( 23 Jul 2023 01:11 )             47.7     07-23    140  |  104  |  21  ----------------------------<  123<H>  3.6   |  23  |  1.04    Ca    9.1      23 Jul 2023 01:12  Phos  3.0     07-23  Mg     2.4     07-23    TPro  6.5  /  Alb  3.8  /  TBili  0.7  /  DBili  x   /  AST  67<H>  /  ALT  28  /  AlkPhos  93  07-23    PT/INR - ( 23 Jul 2023 01:12 )   PT: 12.4 sec;   INR: 1.08 ratio         PTT - ( 23 Jul 2023 01:12 )  PTT:43.8 sec      ASSESSMENT & PLAN:   66 y/o M w/ a significant cardiac hx of CAD (s/p 10 Stents, Last one 2019), HTN, HLD, AFIB (on Eliquis) admitted w/ STEMI s/p C POBA to RCA    Plan:  ======================= Neuro =======================   aaox 3 No active issues  - Neuro assessment as per ICU protocol    =======================  Pulm =======================   Denies SOB or dyspnea   O 2 sat >92% on room air   - Oxygen supplement as needed     =======================  Cardiovascular =======================   #STEMI s/p POBA to RCA  7/22 LHC: POBA to RCA. 80% pCX, 40% in-stent stenosis pLAD, 60% in-stent stenosis of ostium portion of LAD.  7/22 TTE: EF 47% The basal and mid inferior wall and basal and mid inferolateral walls are akinetic   s/p Asprin and Brilinta loaded  - Triple therapy, pt plavix loaded  - Cont. ASA, plavix and heparin  - was on Crestor 40 and now on Lipitor 80mg HS  - Follow up w/ Dr. Camacho Monday   - GDMT  - coreg 3.125 initiated  - losartan to begin tomorrow  - CE peaked, trend lactate down  - Risk stratification: TSH, Lipid and A1c    ======================= GI =======================   - DASH diet    ======================= RENAL cR 0.9-1.0 =======================   - Monitor U/O, BUN/Cr, and lytes  - Maintain K+>4.0 mag >2.0    ======================= ID =======================   Pt is afebrile and no leukocytosis  No need for abx at this point  - Trend WBC       FOR FOLLOW UP:  -Initiate GDMT, titrate BB, valsartan  -transition heparin to PO AC  -f/u with Dr. Polo Monday    Report given to Dr. Bhatt and pt will be transferred to tele floor      IKER Cleveland PA CCU Transfer Note    Transfer from: CCU    Transfer to: ( x ) Telemetry --    Accepting Physician:    Team covering on floor: ACP/Resident team   Signout given to: Hospitalist and     CCU COURSE:  67-year-old male w/ pmhx of  CAD with (10 stents last one placed in 2019) HTN, A-fib(on Eliquis), HLD presenting with a chief complaint of chest pain.  Patient was waking up sleep with midsternal chest pain.  Patient reports that he had diaphoresis at this time.  Patient reports pain that is radiating to bilateral arms.  The patient denies any nausea or vomiting.  Patient states that he has had an MI back in 2007. Pt also reports shortness of breath. The patient took nitroglycerin metoprolol at home for symptoms. The patient denies nausea, vomiting, headache, visual changes, abdominal pain, numbness.  Transferred to NS for LHC s/p LHC POBA to RCA and initiation of triple therapy and GDMT    PAST MEDICAL & SURGICAL HISTORY:  Hypercholesteremia      HTN (hypertension)      H/O heart artery stent  2008, 2012      Asthma      MI (myocardial infarction)  2008      Melanoma      Ankle fracture      Atrial fibrillation  On Xarelto      No Past Surgical History      S/P angioplasty with stent      Melanoma  removal      History of coronary artery stent placement  2007, 2012, 2013          Vital Signs Last 24 Hrs  T(C): 36.6 (22 Jul 2023 23:00), Max: 36.7 (22 Jul 2023 19:00)  T(F): 97.9 (22 Jul 2023 23:00), Max: 98.1 (22 Jul 2023 19:00)  HR: 65 (23 Jul 2023 05:00) (55 - 143)  BP: 123/74 (23 Jul 2023 05:00) (105/59 - 156/76)  BP(mean): 93 (23 Jul 2023 05:00) (76 - 111)  RR: 16 (23 Jul 2023 05:00) (7 - 25)  SpO2: 95% (23 Jul 2023 05:00) (91% - 98%)    Parameters below as of 23 Jul 2023 05:00  Patient On (Oxygen Delivery Method): room air      MEDICATIONS  (STANDING):  aspirin  chewable 81 milliGRAM(s) Oral daily  atorvastatin 80 milliGRAM(s) Oral at bedtime  carvedilol 3.125 milliGRAM(s) Oral every 12 hours  chlorhexidine 2% Cloths 1 Application(s) Topical at bedtime  chlorhexidine 4% Liquid 1 Application(s) Topical <User Schedule>  clopidogrel Tablet 75 milliGRAM(s) Oral daily  finasteride 5 milliGRAM(s) Oral daily  heparin  Infusion 1000 Unit(s)/Hr (12 mL/Hr) IV Continuous <Continuous>  losartan 12.5 milliGRAM(s) Oral daily    MEDICATIONS  (PRN):        CARDIAC MARKERS ( 22 Jul 2023 20:50 )  x     / x     / 381 U/L / x     / 62.2 ng/mL  CARDIAC MARKERS ( 22 Jul 2023 13:44 )  x     / x     / 389 U/L / x     / 72.5 ng/mL                            15.8   9.94  )-----------( 169      ( 23 Jul 2023 01:11 )             47.7     07-23    140  |  104  |  21  ----------------------------<  123<H>  3.6   |  23  |  1.04    Ca    9.1      23 Jul 2023 01:12  Phos  3.0     07-23  Mg     2.4     07-23    TPro  6.5  /  Alb  3.8  /  TBili  0.7  /  DBili  x   /  AST  67<H>  /  ALT  28  /  AlkPhos  93  07-23    PT/INR - ( 23 Jul 2023 01:12 )   PT: 12.4 sec;   INR: 1.08 ratio         PTT - ( 23 Jul 2023 01:12 )  PTT:43.8 sec      ASSESSMENT & PLAN:   68 y/o M w/ a significant cardiac hx of CAD (s/p 10 Stents, Last one 2019), HTN, HLD, AFIB (on Eliquis) admitted w/ STEMI s/p C POBA to RCA    Plan:  ======================= Neuro =======================   aaox 3 No active issues  - Neuro assessment as per ICU protocol    =======================  Pulm =======================   Denies SOB or dyspnea   O 2 sat >92% on room air   - Oxygen supplement as needed     =======================  Cardiovascular =======================   #STEMI s/p POBA to RCA  7/22 LHC: POBA to RCA. 80% pCX, 40% in-stent stenosis pLAD, 60% in-stent stenosis of ostium portion of LAD.  7/22 TTE: EF 47% The basal and mid inferior wall and basal and mid inferolateral walls are akinetic   s/p Asprin and Brilinta loaded  - Triple therapy, pt plavix loaded  - Cont. ASA, plavix and heparin  - was on Crestor 40 and now on Lipitor 80mg HS  - Follow up w/ Dr. Camacho Monday   - GDMT  - coreg 3.125 initiated  - losartan to begin tomorrow  - CE peaked, trend lactate down  - Risk stratification: TSH, Lipid and A1c    ======================= GI =======================   - DASH diet    ======================= RENAL cR 0.9-1.0 =======================   - Monitor U/O, BUN/Cr, and lytes  - Maintain K+>4.0 mag >2.0    ======================= ID =======================   Pt is afebrile and no leukocytosis  No need for abx at this point  - Trend WBC       FOR FOLLOW UP:  -Initiate GDMT, titrate BB, valsartan  -transition heparin to PO AC  -f/u with Dr. Polo Monday    Report given to Dr. Bhatt and pt will be transferred to tele [facility] and updated info given to ACP &[id number])

## 2023-07-23 NOTE — PROGRESS NOTE ADULT - SUBJECTIVE AND OBJECTIVE BOX
CICU DAY NOTE  Admission date: 7/22/23  Chief complaint/ Diagnosis: STEMI   HPI:  67-year-old male w/ pmhx of  CAD with (10 stents last one placed in 2019) HTN, A-fib(on Eliquis), HLD presenting with a chief complaint of chest pain.  Patient was waking up sleep with midsternal chest pain.  Patient reports that he had diaphoresis at this time.  Patient reports pain that is radiating to bilateral arms.  The patient denies any nausea or vomiting.  Patient states that he has had an MI back in 2007. Pt also reports shortness of breath. The patient took nitroglycerin metoprolol at home for symptoms. The patient denies nausea, vomiting, headache, visual changes, abdominal pain, numbness.  Transferred to NS for C s/p [facility] POBA to RCA    (22 Jul 2023 10:03)    Interval history:     REVIEW OF SYSTEMS  Denies CP, Palpitation, SOB, Dyspnea [ X ] All other systems negative    MEDICATIONS  (STANDING)  aspirin  chewable 81 milliGRAM(s) Oral daily  atorvastatin 80 milliGRAM(s) Oral at bedtime  carvedilol 3.125 milliGRAM(s) Oral every 12 hours  chlorhexidine 2% Cloths 1 Application(s) Topical at bedtime  chlorhexidine 4% Liquid 1 Application(s) Topical <User Schedule>  clopidogrel Tablet 75 milliGRAM(s) Oral daily  finasteride 5 milliGRAM(s) Oral daily  heparin  Infusion 1000 Unit(s)/Hr (12 mL/Hr) IV Continuous <Continuous>  losartan 12.5 milliGRAM(s) Oral daily    PAST MEDICAL & SURGICAL HISTORY:  Hypercholesteremia  HTN (hypertension)  H/O heart artery stent 2008, 2012  Asthma  MI (myocardial infarction) 2008  Melanoma  Ankle fracture  Atrial fibrillation On Xarelto  No Past Surgical History  S/P angioplasty with stent  Melanoma removal    Allergy   contrast dye- itch/rash (Other)  penicillin (Anaphylaxis)  Shrimp (Anaphylaxis)    ICU Vital Signs Last 24 Hrs  T(C): 36.5 (Max: 36.7)  HR: 67 (55 - 143)  BP: 123/69  (105/59 - 156/76)  BP(mean): 90 (76 - 111)  RR: 18  (7 - 25)  SpO2: 96%  (91% - 98%) on room air     I&O's Summary  IN: 1282 mL / OUT: 1025 mL / NET: 257 mL    PHYSICAL EXAM  Appearance: Normal, NAD  HEAD:  Normocephalic  EYES:  PERRLA, conjunctiva and sclera clear  NECK: Supple, No JVD  CHEST/LUNG: Clear to auscultation bilaterally; No wheezing  HEART: Normal S1, S2. No murmurs, rubs, or gallops  ABDOMEN: + Bowel sounds, Soft, NT, ND   EXTREMITIES:  2+ Peripheral Pulses, No clubbing, cyanosis, or edema  NEUROLOGY: non-focal, aaox3  SKIN: No rashes or lesions    Interpretation of Telemetry:                        15.8   9.94  )-----------( 169                 47.7       140  |  104  |  21  ------------------------<  123<H>  3.6   |  23  |  1.04    Ca    9.1 Phos  3.0     Mg     2.4      TPro  6.5  /  Alb  3.8  /  TBili  0.7  /  DBili  x   /  AST  67<H>  /  ALT  28  /  AlkPhos  93        CARDIAC MARKERS ( 22 Jul 2023 20:50 )  x     / x     / 381 U/L / x     / 62.2 ng/mL  CARDIAC MARKERS ( 22 Jul 2023 13:44 )  x     / x     / 389 U/L / x     / 72.5 ng/mL               CICU DAY NOTE  Admission date: 7/22/23  Chief complaint/ Diagnosis: STEMI   HPI:  67-year-old male w/ pmhx of  CAD with (10 stents last one placed in 2019) HTN, A-fib(on Eliquis), HLD presenting with a chief complaint of chest pain.  Patient was waking up sleep with midsternal chest pain.  Patient reports that he had diaphoresis at this time.  Patient reports pain that is radiating to bilateral arms.  The patient denies any nausea or vomiting.  Patient states that he has had an MI back in 2007. Pt also reports shortness of breath. The patient took nitroglycerin metoprolol at home for symptoms. The patient denies nausea, vomiting, headache, visual changes, abdominal pain, numbness.  Transferred to NS for LHC s/p C POBA to RCA     Interval history: pt remains hemodynamically stable and CP free  Lactate elevated 3.1 from 2.1  able to tolerate low doses of coreg and Losartan    REVIEW OF SYSTEMS  Denies CP, Palpitation, SOB, Dyspnea [ X ] All other systems negative    MEDICATIONS  (STANDING)  aspirin  chewable 81 milliGRAM(s) Oral daily  atorvastatin 80 milliGRAM(s) Oral at bedtime  carvedilol 3.125 milliGRAM(s) Oral every 12 hours  chlorhexidine 2% Cloths 1 Application(s) Topical at bedtime  chlorhexidine 4% Liquid 1 Application(s) Topical <User Schedule>  clopidogrel Tablet 75 milliGRAM(s) Oral daily  finasteride 5 milliGRAM(s) Oral daily  heparin  Infusion 1000 Unit(s)/Hr (12 mL/Hr) IV Continuous <Continuous>  losartan 12.5 milliGRAM(s) Oral daily    PAST MEDICAL & SURGICAL HISTORY:  Hypercholesteremia  HTN (hypertension)  H/O heart artery stent 2008, 2012  Asthma  MI (myocardial infarction) 2008  Melanoma  Ankle fracture  Atrial fibrillation On Xarelto  No Past Surgical History  S/P angioplasty with stent  Melanoma removal    Allergy   contrast dye- itch/rash (Other)  penicillin (Anaphylaxis)  Shrimp (Anaphylaxis)    ICU Vital Signs Last 24 Hrs  T(C): 36.5 (Max: 36.7)  HR: 67 (55 - 143)  BP: 123/69  (105/59 - 156/76)  BP(mean): 90 (76 - 111)  RR: 18  (7 - 25)  SpO2: 96%  (91% - 98%) on room air     I&O's Summary  IN: 1282 mL / OUT: 1025 mL / NET: 257 mL    PHYSICAL EXAM  Appearance: Normal, NAD  HEAD:  Normocephalic  EYES:  PERRLA, conjunctiva and sclera clear  NECK: Supple, No JVD  CHEST/LUNG: Clear to auscultation bilaterally; No wheezing  HEART: Normal S1, S2. No murmurs, rubs, or gallops  ABDOMEN: + Bowel sounds, Soft, NT, ND   EXTREMITIES:  2+ Peripheral Pulses, No clubbing, cyanosis, or edema  NEUROLOGY: non-focal, aaox3  SKIN: No rashes or lesions    Interpretation of Telemetry:                        15.8   9.94  )-----------( 169                 47.7       140  |  104  |  21  ------------------------<  123<H>  3.6   |  23  |  1.04    Ca    9.1 Phos  3.0     Mg     2.4      TPro  6.5  /  Alb  3.8  /  TBili  0.7  /  DBili  x   /  AST  67<H>  /  ALT  28  /  AlkPhos  93        CARDIAC MARKERS ( 22 Jul 2023 20:50 )  x     / x     / 381 U/L / x     / 62.2 ng/mL  CARDIAC MARKERS ( 22 Jul 2023 13:44 )  x     / x     / 389 U/L / x     / 72.5 ng/mL

## 2023-07-23 NOTE — PROGRESS NOTE ADULT - ASSESSMENT
66 y/o M w/ a significant cardiac hx of CAD (s/p 10 Stents, Last one 2019), HTN, HLD, AFIB (on Eliquis) admitted w/ STEMI s/p LHC POBA to RCA    Plan:  ======================= Neuro =======================   aaox 3 No active issues  - Neuro assessment as per ICU protocol    =======================  Pulm =======================   Denies SOB or dyspnea   O 2 sat >92% on room air   - Oxygen supplement as needed     =======================  Cardiovascular =======================   #STEMI s/p POBA to RCA  7/22 TTE: EF 47% The basal and mid inferior wall and basal and mid inferolateral walls are akinetic   s/p Asprin and Brilinta loaded  - Triple therapy, pt plavix loaded  - Cont. ASA, plavix and heparin  - was on Crestor 40 and now on Lipitor 80mg HS  - Follow up w/ Dr. Camacho Monday   - GDMT  - coreg 3.125 initiated  - losartan to begin tomorrow  - CE peaked, trend lactate down  - Risk stratification: TSH, Lipid and A1c    ======================= GI =======================   - DASH diet    ======================= RENAL cR 0.9-1.0 =======================   - Monitor U/O, BUN/Cr, and lytes  - Maintain K+>4.0 mag >2.0    ======================= ID =======================   Pt is afebrile and no leukocytosis  No need for abx at this point  - Trend WBC    68 y/o M w/ a significant cardiac hx of CAD (s/p 10 Stents, Last one 2019), HTN, HLD, AFIB (on Eliquis) admitted w/ STEMI s/p LHC POBA to RCA    Plan:  ======================= Neuro =======================   aaox 3 No active issues  - Neuro assessment as per ICU protocol    =======================  Pulm =======================   Denies SOB or dyspnea   O 2 sat >92% on room air   - Oxygen supplement as needed     =======================  Cardiovascular =======================   #STEMI s/p POBA to RCA  7/22 TTE: EF 47% The basal and mid inferior wall and basal and mid inferolateral walls are akinetic   s/p Asprin and Brilinta loaded  - Triple therapy, pt plavix loaded, CE peaked   - Cont. ASA, plavix and heparin  - was on Crestor 40 and now on Lipitor 80mg HS  - Follow up w/ Dr. Camacho Monday   - coreg 3.125 initiated  - Low dose of Losartan   - Risk stratification: TSH, Lipid and A1c:5.6     ======================= GI =======================   - DASH diet    ======================= RENAL cR 0.9-1.0 =======================   - Monitor U/O, BUN/Cr, and lytes  - Maintain K+>4.0 mag >2.0    ======================= ID =======================   Pt is afebrile and no leukocytosis  No need for abx at this point  - Trend WBC

## 2023-07-23 NOTE — DISCHARGE NOTE PROVIDER - NSDCMRMEDTOKEN_GEN_ALL_CORE_FT
aspirin 81 mg oral tablet: 1 tab(s) orally once a day  citracal: 1 tab once a day  colace: 1 cap once a day  Crestor 40 mg oral tablet: 1 tab(s) orally every other day  Eliquis 5 mg oral tablet: 1 tab(s) orally 2 times a day  finasteride 5 mg oral tablet: 1 tab(s) orally once a day  isosorbide mononitrate 60 mg oral tablet, extended release: 1 tab(s) orally once a day  metoprolol succinate 50 mg oral tablet, extended release: 1 tab(s) orally once a day  ubiquinone 200 mg oral capsule: 200 milligram(s) orally once a day  valsartan-hydrochlorothiazide 320 mg-25 mg oral tablet: 1 tab(s) orally once a day  Vitamin D3 2000 intl units oral capsule: 1 cap(s) orally once a day   aspirin 81 mg oral tablet: 1 tab(s) orally once a day  citracal: 1 tab once a day  colace: 1 cap once a day  Crestor 40 mg oral tablet: 1 tab(s) orally every other day  Eliquis 5 mg oral tablet: 1 tab(s) orally 2 times a day  finasteride 5 mg oral tablet: 1 tab(s) orally once a day  ubiquinone 200 mg oral capsule: 200 milligram(s) orally once a day  valsartan-hydrochlorothiazide 320 mg-25 mg oral tablet: 1 tab(s) orally once a day  Vitamin D3 2000 intl units oral capsule: 1 cap(s) orally once a day   aspirin 81 mg oral tablet: 1 tab(s) orally once a day  carvedilol 3.125 mg oral tablet: 1 tab(s) orally every 12 hours  citracal: 1 tab once a day  clopidogrel 75 mg oral tablet: 1 tab(s) orally once a day  colace: 1 cap once a day  Crestor 40 mg oral tablet: 1 tab(s) orally every other day  Eliquis 5 mg oral tablet: 1 tab(s) orally 2 times a day  finasteride 5 mg oral tablet: 1 tab(s) orally once a day  ubiquinone 200 mg oral capsule: 200 milligram(s) orally once a day  valsartan-hydrochlorothiazide 320 mg-25 mg oral tablet: 1 tab(s) orally once a day  Vitamin D3 2000 intl units oral capsule: 1 cap(s) orally once a day

## 2023-07-23 NOTE — PROGRESS NOTE ADULT - CRITICAL CARE ATTENDING COMMENT
STEMI s/p PCI with POBA to RCA  - educated on the importance of DAPT   - will d/w the [facility] program (Dr Esteves) in regards to the future plans for his CAD  - ARB initiated  - Beta-blocker initiated  - patient is on Lipitor 80 mg daily  - trend cardiac enzymes  - TTE prior to discharge  - c/w hep gtt for the afib  - trend the lactate    Patient is critically ill, requiring critical care services. Despite the current condition, the patient is at a high risk of clinical and hemodynamic demise

## 2023-07-23 NOTE — DISCHARGE NOTE PROVIDER - CARE PROVIDER_API CALL
Anish Colón  Cardiovascular Disease  [address], [address]  [place] [address]  Phone: [phone]  Fax: [phone]  Established Patient  Follow Up Time: 1 week

## 2023-07-23 NOTE — DISCHARGE NOTE PROVIDER - HOSPITAL COURSE
67-year-old male w/ pmhx of  CAD with (10 stents last one placed in 2019) HTN, A-fib(on Eliquis), HLD presenting with a chief complaint of chest pain.  Patient was waking up sleep with midsternal chest pain.  Patient reports that he had diaphoresis at this time.  Patient reports pain that is radiating to bilateral arms.  The patient denies any nausea or vomiting.  Patient states that he has had an MI back in 2007. Pt also reports shortness of breath. The patient took nitroglycerin metoprolol at home for symptoms. The patient denies nausea, vomiting, headache, visual changes, abdominal pain, numbness.  Transferred to NS for LHC . Pt is now s/p LHC  POBA to RCA via RRA access on ASA and Brilinta     (22 Jul 2023 10:03)   67-year-old male w/ pmhx of  CAD with (10 stents last one placed in 2019) HTN, A-fib(on Eliquis), HLD presenting with a chief complaint of chest pain.  Patient was waking up sleep with midsternal chest pain.  Patient reports that he had diaphoresis at this time.  Patient reports pain that is radiating to bilateral arms.  The patient denies any nausea or vomiting.  Patient states that he has had an MI back in 2007. Pt also reports shortness of breath. The patient took nitroglycerin metoprolol at home for symptoms. The patient denies nausea, vomiting, headache, visual changes, abdominal pain, numbness.  Transferred to NS for LHC . Pt is now s/p LHC  POBA to RCA via RRA access on ASA and Plavix      (22 Jul 2023 10:03)   67-year-old male w/ pmhx of  CAD with (10 stents last one placed in 2019) HTN, A-fib(on Eliquis), HLD presenting with a chief complaint of chest pain.  Patient was waking up sleep with midsternal chest pain.  Patient reports that he had diaphoresis at this time.  Patient reports pain that is radiating to bilateral arms.  The patient denies any nausea or vomiting.  Patient states that he has had an MI back in 2007. Pt also reports shortness of breath. The patient took nitroglycerin metoprolol at home for symptoms. The patient denies nausea, vomiting, headache, visual changes, abdominal pain, numbness.  Transferred to NS for LHC . Pt is now s/p LHC  POBA to RCA via RRA access on ASA and Plavix      (22 Jul 2023 10:03)    Initiated/Incomplete    67-year-old male history of CAD with 10 stents last one placed in 2019, HTN, A-fib on eliquis, HLD presenting with a chief complaint of chest pain.  Patient was woken up from sleep with midsternal chest pain radiating down his left arm. He took 2 nitroglycerin tabs, his metoprolol and BP meds with no improvement in his pain. He endorsed associated SOB, mild nausea and diaphoresis.   Pt was admitted for STEMI, now s/p LHC w/ POBA to RCA via RRA access on ASA and Plavix on 7/22/23.    TTE on 7/25/23 noted:  "1. Small left ventricular cavity size. The left ventricular wall thickness is mildly increased. The left ventricular systolic function is mildly decreased with an ejection fraction of 47 % by Molina's method of disks. There are regional wall motion abnormalities.   2. Basal and mid inferior wall and basal and mid inferolateral wall are abnormal.   3. There is mild (grade 1) left ventricular diastolic dysfunction, with normal filling pressure. Analysis of left ventricular diastolic function and filling pressure is made challenging by the presence of merged E and A wave.   4. Normal right ventricular cavity size, normal right ventricular wall thickness and reduced systolic right ventricular function.   5. There is hypokinesis of the right ventricular free wall and apex.   6. There is trace tricuspid regurgitation. Estimated pulmonary artery systolic pressure is 32 mmHg.   7. No evidence of aortic regurgitation.   8. No pericardial effusion seen.   9. Compared to the transthoracic echocardiogram performed on 7/12/2023 There is no change in LVEF however, there is more evident inferior and inferolateral wall motion abnormalities."     Pt to continue triple therapy (Eliquis, ASA and plavix) for 1 week. After 1 week, discontinue ASA.  Pt to continue with statin, coreg and losartan. Pt to follow up with PCP/cardiologist Dr. Anish Colón within 1 week of discharge for further discussion on definitive revasc plan.  Pt now medically stable to be discharged home. Discharge and med rec discussed with attending Dr. Bhatt.

## 2023-07-23 NOTE — DISCHARGE NOTE PROVIDER - NSDCCPCAREPLAN_GEN_ALL_CORE_FT
PRINCIPAL DISCHARGE DIAGNOSIS  Diagnosis: ST elevation MI (STEMI)  Assessment and Plan of Treatment: Continue with ASA and Brilinta   Low cholesterol diet advised      SECONDARY DISCHARGE DIAGNOSES  Diagnosis: HLD (hyperlipidemia)  Assessment and Plan of Treatment: Continue with statin  Low cholesterol diet advised  lifestyle modification   Exercise at least 5 days per week for at least 30 mins    Diagnosis: HTN (hypertension)  Assessment and Plan of Treatment: Continue with   Low sodium diet advised     PRINCIPAL DISCHARGE DIAGNOSIS  Diagnosis: ST elevation MI (STEMI)  Assessment and Plan of Treatment: Continue with ASA and Plavix 75mg daily   Low cholesterol diet advised  exercise as tolerated      SECONDARY DISCHARGE DIAGNOSES  Diagnosis: HLD (hyperlipidemia)  Assessment and Plan of Treatment: Continue with atorvastatin 80mg nightly   Low cholesterol diet advised  lifestyle modification   Exercise at least 5 days per week for at least 30 mins    Diagnosis: HTN (hypertension)  Assessment and Plan of Treatment: Continue with antihypertensive regimen   Low sodium diet advised     PRINCIPAL DISCHARGE DIAGNOSIS  Diagnosis: ST elevation MI (STEMI)  Assessment and Plan of Treatment: Continue with ASA and Plavix 75mg daily   Low cholesterol diet advised  exercise as tolerated      SECONDARY DISCHARGE DIAGNOSES  Diagnosis: HLD (hyperlipidemia)  Assessment and Plan of Treatment: Continue with Atorvastatin 80mg nightly   Low cholesterol diet advised  lifestyle modification   Exercise at least 5 days per week for at least 30 mins    Diagnosis: HTN (hypertension)  Assessment and Plan of Treatment: Continue with antihypertensive regimen  Low sodium diet advised     PRINCIPAL DISCHARGE DIAGNOSIS  Diagnosis: ST elevation MI (STEMI)  Assessment and Plan of Treatment: Continue with ASA and Plavix 75mg daily   Low cholesterol diet advised  exercise as tolerated      SECONDARY DISCHARGE DIAGNOSES  Diagnosis: HLD (hyperlipidemia)  Assessment and Plan of Treatment: Continue with Atorvastatin 80mg nightly   Low cholesterol diet advised  lifestyle modification   Exercise at least 5 days per week for at least 30 mins    Diagnosis: HTN (hypertension)  Assessment and Plan of Treatment: Continue with antihypertensive regimen  Low sodium diet advised    Diagnosis: Afib  Assessment and Plan of Treatment: Continue with Eliquis 5mg twice daily     PRINCIPAL DISCHARGE DIAGNOSIS  Diagnosis: ST elevation MI (STEMI)  Assessment and Plan of Treatment: Continue with ASA and Plavix 75mg daily   Low cholesterol diet advised  exercise as tolerated      SECONDARY DISCHARGE DIAGNOSES  Diagnosis: HLD (hyperlipidemia)  Assessment and Plan of Treatment: Continue with Atorvastatin 80mg nightly   Low cholesterol diet advised  lifestyle modification   Exercise at least 5 days per week for  30 mins    Diagnosis: HTN (hypertension)  Assessment and Plan of Treatment: Continue with antihypertensive regimen  Low sodium diet advised    Diagnosis: Afib  Assessment and Plan of Treatment: Continue with Eliquis 5mg twice daily     PRINCIPAL DISCHARGE DIAGNOSIS  Diagnosis: ST elevation MI (STEMI)  Assessment and Plan of Treatment: Continue with Eliquis, Aspirin and Plavix 75mg daily for 1 week. After 1 week, discontinue Aspirin and continue Eliquis and Plavix daily.   Follow up with your PCP/cardiologist within 1 week of discharge.  Low cholesterol diet advised  Exercise as tolerated      SECONDARY DISCHARGE DIAGNOSES  Diagnosis: HLD (hyperlipidemia)  Assessment and Plan of Treatment: Continue with Atorvastatin 80mg nightly   Low cholesterol diet advised  lifestyle modification   Exercise at least 5 days per week for  30 mins      Diagnosis: HTN (hypertension)  Assessment and Plan of Treatment: Continue with antihypertensive regimen  Low sodium diet advised  Low salt diet.  Activity as tolerated.  Take all medication as prescribed.  Follow up with your medical doctor for routine blood pressure monitoring at your next visit.  Notify your doctor if you have any of the following symptoms:   Dizziness, Lightheadedness, Blurry vision, Headache, Chest pain, Shortness of breath.      Diagnosis: Afib  Assessment and Plan of Treatment: Continue with Eliquis 5mg twice daily  Atrial fibrillation is a common heart rhythm problem which increases the risk of stroke and heat attack.  It helps if you control your blood pressure, avoid alcohol, cut down on caffeine, get treatment for your thyroid if it is overactive, and perform moderate exercise in consultation with your Primary Care Provider.  Call your doctor if you experience chest tightness/pain, lightheadedness, loss of consciousness, shortness of breath (especially with exercise), feel your heart racing or beating unusually, frequent or abnormal bleeding.  It is important to take all your heart medications as prescribed.

## 2023-07-24 DIAGNOSIS — I21.3 ST ELEVATION (STEMI) MYOCARDIAL INFARCTION OF UNSPECIFIED SITE: ICD-10-CM

## 2023-07-24 DIAGNOSIS — I10 ESSENTIAL (PRIMARY) HYPERTENSION: ICD-10-CM

## 2023-07-24 DIAGNOSIS — I25.10 ATHEROSCLEROTIC HEART DISEASE OF NATIVE CORONARY ARTERY WITHOUT ANGINA PECTORIS: ICD-10-CM

## 2023-07-24 LAB
APTT BLD: 44.8 SEC — HIGH (ref 27.5–35.5)
APTT BLD: 71.7 SEC — HIGH (ref 27.5–35.5)
APTT BLD: 73 SEC — HIGH (ref 27.5–35.5)
LACTATE SERPL-SCNC: 1.5 MMOL/L — SIGNIFICANT CHANGE UP (ref 0.5–2)

## 2023-07-24 PROCEDURE — 99222 1ST HOSP IP/OBS MODERATE 55: CPT

## 2023-07-24 PROCEDURE — 99232 SBSQ HOSP IP/OBS MODERATE 35: CPT

## 2023-07-24 RX ADMIN — CARVEDILOL PHOSPHATE 3.12 MILLIGRAM(S): 80 CAPSULE, EXTENDED RELEASE ORAL at 18:18

## 2023-07-24 RX ADMIN — VALSARTAN 80 MILLIGRAM(S): 80 TABLET ORAL at 05:48

## 2023-07-24 RX ADMIN — FINASTERIDE 5 MILLIGRAM(S): 5 TABLET, FILM COATED ORAL at 12:38

## 2023-07-24 RX ADMIN — ATORVASTATIN CALCIUM 80 MILLIGRAM(S): 80 TABLET, FILM COATED ORAL at 21:53

## 2023-07-24 RX ADMIN — HEPARIN SODIUM 14 UNIT(S)/HR: 5000 INJECTION INTRAVENOUS; SUBCUTANEOUS at 20:16

## 2023-07-24 RX ADMIN — HEPARIN SODIUM 14 UNIT(S)/HR: 5000 INJECTION INTRAVENOUS; SUBCUTANEOUS at 18:18

## 2023-07-24 RX ADMIN — Medication 81 MILLIGRAM(S): at 12:38

## 2023-07-24 RX ADMIN — CLOPIDOGREL BISULFATE 75 MILLIGRAM(S): 75 TABLET, FILM COATED ORAL at 12:38

## 2023-07-24 RX ADMIN — CARVEDILOL PHOSPHATE 3.12 MILLIGRAM(S): 80 CAPSULE, EXTENDED RELEASE ORAL at 05:48

## 2023-07-24 NOTE — PROGRESS NOTE ADULT - ASSESSMENT
67 male h/o cad s/p pci, htn, afib on eliquis, chol, here with STEMI    STEMI  s/p cath with with POBA to RCA  cards f/u  asa/plavix/statin  BB  echo noted  cards f/u pending for definitive plan    afib  AC with hep gtt  cont BB  tele    HTN  cont current rx  monitor      67 male h/o cad s/p pci, htn, afib on eliquis, chol, here with STEMI    STEMI  s/p cath with with POBA to RCA  cards f/u  asa/plavix/statin  BB  echo noted  cards f/u pending for definitive plan    afib  AC with hep gtt  cont BB  tele    HTN  cont current rx  monitor bp    chol  cont statin    Advanced care planning was discussed with patient and family.  Advanced care planning forms were reviewed and discussed as appropriate.  Differential diagnosis and plan of care discussed with patient after the evaluation.   Pain assessed and judicious use of narcotics when appropriate was discussed.  Importance of Fall prevention discussed.  Counseling on Smoking and Alcohol cessation was offered when appropriate.  Counseling on Diet, exercise, and medication compliance was done.       Approx 60 minutes spent.

## 2023-07-24 NOTE — PROGRESS NOTE ADULT - SUBJECTIVE AND OBJECTIVE BOX
[facility] INVASIVE CARDIOLOGY- (Salvador, Terri, Sharon, Candie, Linn, Lou, Bernarda, Rafal, Deandre, Bobby)   CARDIAC CATH LAB, [facility] TEAM   [phone]      CHIEF COMPLAINT: Patient is a 67y old  Male who presents with a chief complaint of STEMI (24 Jul 2023 07:42)      HPI:  67-year-old male w/ pmhx of  CAD with (10 stents last one placed in 2019) HTN, A-fib(on Eliquis), HLD presenting with a chief complaint of chest pain.  Patient was waking up sleep with midsternal chest pain.  Patient reports that he had diaphoresis at this time.  Patient reports pain that is radiating to bilateral arms.  The patient denies any nausea or vomiting.  Patient states that he has had an MI back in 2007. Pt also reports shortness of breath. The patient took nitroglycerin metoprolol at home for symptoms. The patient denies nausea, vomiting, headache, visual changes, abdominal pain, numbness.  Transferred to NS for LHC s/p [facility] POBA to RCA    (22 Jul 2023 10:03)        Subjective/Observations: patient seen and examined.  Denies chest pain, dyspena, dizziness, palpitations, N&V, HA      Review of Systems all WNL except below indicated:    Constitutional: [ ] Fever [ ] Chills [ ] Fatigue [ ] Weight change   HEENT: [ ] Blurred vision [ ] Eye Pain [ ] Headache [ ] Runny nose [ ] Sore Throat   Respiratory: [ ] Cough [ ] Wheezing [ ] Shortness of breath  Cardiovascular: [ ] Chest Pain [ ] Palpitations [ ] CRUZ [ ] PND [ ] Orthopnea  Gastrointestinal: [ ] Abdominal Pain [ ] Diarrhea [ ] Constipation [ ] Hemorrhoids [ ] Nausea [ ] Vomiting  Genitourinary: [ ] Nocturia [ ] Dysuria [ ] Incontinence  Extremities: [ ] Swelling [ ] Joint Pain  Neurologic: [ ] Focal deficit [ ] Paresthesias [ ] Syncope  Lymphatic: [ ] Swelling [ ] Lymphadenopathy   Skin: [ ] Rash [ ] Ecchymoses [ ] Wounds [ ] Lesions  Psychiatry: [ ] Depression [ ] Suicidal/Homicidal Ideation [ ] Anxiety [ ] Sleep Disturbances  [ ] 10 point review of systems is otherwise negative except as mentioned above            [ ]Unable to obtain    PAST MEDICAL & SURGICAL HISTORY:  Hypercholesteremia      HTN (hypertension)      H/O heart artery stent  2008, 2012      Asthma      MI (myocardial infarction)  2008      Melanoma      Ankle fracture      Atrial fibrillation  On Xarelto      No Past Surgical History      S/P angioplasty with stent      Melanoma  removal      History of coronary artery stent placement  2007, 2012, 2013          MEDICATIONS  (STANDING):  aspirin  chewable 81 milliGRAM(s) Oral daily  atorvastatin 80 milliGRAM(s) Oral at bedtime  carvedilol 3.125 milliGRAM(s) Oral every 12 hours  clopidogrel Tablet 75 milliGRAM(s) Oral daily  finasteride 5 milliGRAM(s) Oral daily  heparin  Infusion 1000 Unit(s)/Hr (14 mL/Hr) IV Continuous <Continuous>  valsartan 80 milliGRAM(s) Oral daily    MEDICATIONS  (PRN):      Allergies    contrast dye- itch/rash (Other)  penicillin (Anaphylaxis)  Shrimp (Anaphylaxis)    Intolerances          Vital Signs Last 24 Hrs  T(C): 36.9 (24 Jul 2023 04:28), Max: 36.9 (23 Jul 2023 18:00)  T(F): 98.4 (24 Jul 2023 04:28), Max: 98.4 (23 Jul 2023 18:00)  HR: 65 (24 Jul 2023 04:28) (55 - 78)  BP: 119/70 (24 Jul 2023 04:28) (119/70 - 165/83)  BP(mean): 107 (23 Jul 2023 17:00) (93 - 118)  RR: 18 (24 Jul 2023 04:28) (15 - 20)  SpO2: 96% (24 Jul 2023 04:28) (95% - 99%)    Parameters below as of 24 Jul 2023 04:28  Patient On (Oxygen Delivery Method): room air        I&O's Summary    23 Jul 2023 07:01  -  24 Jul 2023 07:00  --------------------------------------------------------  IN: 1204 mL / OUT: 0 mL / NET: 1204 mL          FOCUSED PHYSICAL EXAM:  Pulmonary: Non-labored, breath sounds are clear bilaterally, No wheezing, rales or rhonchi  Cardiovascular: Regular, S1 and S2, No murmurs, rubs, gallops or clicks  cath site: ( groin/radial)  stable w/o bleeding or hematoma, soft, + pulses     LABS: All Labs Reviewed:                        15.8   9.94  )-----------( 169      ( 23 Jul 2023 01:11 )             47.7                         17.2   7.31  )-----------( 189      ( 22 Jul 2023 05:23 )             51.8     23 Jul 2023 01:12    140    |  104    |  21     ----------------------------<  123    3.6     |  23     |  1.04   22 Jul 2023 20:50    139    |  102    |  22     ----------------------------<  139    3.7     |  24     |  1.14   22 Jul 2023 09:20    139    |  102    |  22     ----------------------------<  134    4.2     |  25     |  0.91     Ca    9.1        23 Jul 2023 01:12  Ca    9.3        22 Jul 2023 20:50  Ca    9.5        22 Jul 2023 09:20  Phos  3.0       23 Jul 2023 01:12  Phos  3.1       22 Jul 2023 20:50  Phos  2.8       22 Jul 2023 09:20  Mg     2.4       23 Jul 2023 01:12  Mg     1.8       22 Jul 2023 20:50  Mg     1.8       22 Jul 2023 09:20    TPro  6.5    /  Alb  3.8    /  TBili  0.7    /  DBili  x      /  AST  67     /  ALT  28     /  AlkPhos  93     23 Jul 2023 01:12  TPro  6.7    /  Alb  3.8    /  TBili  0.8    /  DBili  x      /  AST  67     /  ALT  28     /  AlkPhos  96     22 Jul 2023 20:50  TPro  7.3    /  Alb  4.1    /  TBili  0.7    /  DBili  x      /  AST  31     /  ALT  28     /  AlkPhos  107    22 Jul 2023 09:20    PT/INR - ( 23 Jul 2023 01:12 )   PT: 12.4 sec;   INR: 1.08 ratio         PTT - ( 24 Jul 2023 07:14 )  PTT:44.8 sec  CARDIAC MARKERS ( 22 Jul 2023 20:50 )  x     / x     / 381 U/L / x     / 62.2 ng/mL  CARDIAC MARKERS ( 22 Jul 2023 13:44 )  x     / x     / 389 U/L / x     / 72.5 ng/mL          RESULTS:  TELE intepretation:   ECG:  ECHO:  < from: TTE W or WO Ultrasound Enhancing Agent (07.22.23 @ 08:47) >  _______________________________________________________________________________________  CONCLUSIONS:      1. Small left ventricular cavity size. The left ventricular wall thickness is mildly increased. The left ventricular systolic function is mildly decreased with an ejection fraction of 47 % by Molina's method of disks. There are regional wall motion abnormalities.   2. Basal and mid inferior wall and basal and mid inferolateral wall are abnormal.   3. There is mild (grade 1) left ventricular diastolic dysfunction, with normal filling pressure. Analysis of left ventricular diastolic function and filling pressure is made challenging by the presence of merged E and A wave.   4. Normal right ventricular cavity size, normal right ventricular wall thickness and reduced systolic right ventricular function.   5. There is hypokinesis of the right ventricular free wall and apex.   6. There is trace tricuspid regurgitation. Estimated pulmonary artery systolic pressure is 32 mmHg.   7. No evidence of aortic regurgitation.   8. No pericardial effusion seen.   9. Compared to the transthoracic echocardiogram performed on 7/12/2023 There is no change in LVEF however, there is more evident inferior and inferolateral wall motion abnormalities.    < end of copied text >    CATH REPORT:  < from: Cardiac Catheterization (07.22.23 @ 05:59) >  Procedures:               1.    Arterial Access - Right Radial     2.    Ultrasound Guided Access   3.    Diagnostic Coronary Angiography   4.    Left Heart Cath   5.    PCI: Cutting Balloon Angioplasty   6.    PCI: POBA     Indications:                Myocardial infarction with ST elevation  (STEMI)  Lab Visit Indication:       STEMI   PCI Status:                 emergent     Conclusions:     STEMI of the right coronary artery   --There are multiple layers of stent in region of the acutely occluded  right coronary artery  --Left to right, Rentrope grade 3 collateral filling   Mild to moderate in-stent restenosis of the left main, left anterior  descending and first diagonal artery stents  Right dominant system   LVEDP = 18mmHg   No gradient across the aortic valve     Status post angioplasty of the severe in-stent restenosis of the right  coronary artery with resultant ZOEY 3 flow    < end of copied text >

## 2023-07-24 NOTE — CONSULT NOTE ADULT - SUBJECTIVE AND OBJECTIVE BOX
CC: CP    HPI: 67M w CAD s/p multiple PCI last 2019, HTN, AF on eliquis, HL here w CP, found to have STEMI. Now s/p POBA to RCA with definitive revasc plan pending.     Allergies  contrast dye- itch/rash (Other)  penicillin (Anaphylaxis)  Shrimp (Anaphylaxis)    PAST MEDICAL & SURGICAL HISTORY:  Hypercholesteremia  HTN (hypertension)  H/O heart artery stent 2008, 2012  Asthma  MI (myocardial infarction) 2008  Melanoma  Ankle fracture  Atrial fibrillation On Xarelto  S/P angioplasty with stent  Melanoma removal  History of coronary artery stent placement 2007, 2012, 2013    FAMILY HISTORY: nc    Social History:  Denies Smoking   Denies Drug uses (22 Jul 2023 10:03)    MEDS:  Home Medications:  aspirin 81 mg oral tablet: 1 tab(s) orally once a day (12 Jul 2023 12:25)  citracal: 1 tab once a day (12 Jul 2023 12:25)  colace: 1 cap once a day (12 Jul 2023 12:25)  Crestor 40 mg oral tablet: 1 tab(s) orally every other day (12 Jul 2023 12:25)  finasteride 5 mg oral tablet: 1 tab(s) orally once a day (12 Jul 2023 12:25)  isosorbide mononitrate 60 mg oral tablet, extended release: 1 tab(s) orally once a day (12 Jul 2023 12:25)  ubiquinone 200 mg oral capsule: 200 milligram(s) orally once a day (12 Jul 2023 12:25)  valsartan-hydrochlorothiazide 320 mg-25 mg oral tablet: 1 tab(s) orally once a day (12 Jul 2023 12:25)  Vitamin D3 2000 intl units oral capsule: 1 cap(s) orally once a day (12 Jul 2023 12:17)      MEDICATIONS  (STANDING):  aspirin  chewable 81 milliGRAM(s) Oral daily  atorvastatin 80 milliGRAM(s) Oral at bedtime  carvedilol 3.125 milliGRAM(s) Oral every 12 hours  clopidogrel Tablet 75 milliGRAM(s) Oral daily  finasteride 5 milliGRAM(s) Oral daily  heparin  Infusion 1000 Unit(s)/Hr (13 mL/Hr) IV Continuous <Continuous>  valsartan 80 milliGRAM(s) Oral daily    MEDICATIONS  (PRN):      REVIEW OF SYSTEMS:  CONSTITUTIONAL: No fever, weight loss, or fatigue  EYES: No eye pain, visual disturbances, or discharge  ENMT:  No difficulty hearing, tinnitus, vertigo; No sinus or throat pain  NECK: No pain or stiffness  RESPIRATORY: No cough, wheezing, chills or hemoptysis; No Shortness of Breath  CARDIOVASCULAR: No chest pain, palpitations, passing out, dizziness, or leg swelling  GASTROINTESTINAL: No abdominal or epigastric pain. No nausea, vomiting, or hematemesis; No diarrhea or constipation. No melena or hematochezia.  GENITOURINARY: No dysuria, frequency, hematuria, or incontinence  NEUROLOGICAL: No headaches, memory loss, loss of strength, numbness, or tremors  SKIN: No itching, burning, rashes, or lesions   LYMPH Nodes: No enlarged glands  ENDOCRINE: No heat or cold intolerance; No hair loss  MUSCULOSKELETAL: No joint pain or swelling; No muscle, back, or extremity pain  PSYCHIATRIC: No depression, anxiety, mood swings, or difficulty sleeping  HEME/LYMPH: No easy bruising, or bleeding gums  ALLERY AND IMMUNOLOGIC: No hives or eczema	    [x] All others negative	    PHYSICAL EXAM:  Vital Signs Last 24 Hrs  T(C): 36.9 (24 Jul 2023 04:28), Max: 36.9 (23 Jul 2023 18:00)  T(F): 98.4 (24 Jul 2023 04:28), Max: 98.4 (23 Jul 2023 18:00)  HR: 65 (24 Jul 2023 04:28) (55 - 78)  BP: 119/70 (24 Jul 2023 04:28) (119/70 - 165/83)  BP(mean): 107 (23 Jul 2023 17:00) (93 - 118)  RR: 18 (24 Jul 2023 04:28) (15 - 20)  SpO2: 96% (24 Jul 2023 04:28) (95% - 99%)    Parameters below as of 24 Jul 2023 04:28  Patient On (Oxygen Delivery Method): room air    Appearance: Normal	  HEENT:   Normal oral mucosa, PERRL, EOMI	  Lymphatic: No lymphadenopathy  Cardiovascular: Normal S1 S2, No JVD, II/VI CANDIDO, No edema  Respiratory: Lungs clear to auscultation	  Psychiatry: A & O x 3, Mood & affect appropriate  Gastrointestinal:  Soft, Non-tender, + BS	  Skin: No rashes, No ecchymoses, No cyanosis	  Neurologic: Non-focal  Extremities: Normal range of motion, No clubbing, cyanosis or edema  Vascular: Peripheral pulses palpable 2+ bilaterally    LABS:	 	  I&O's Summary    23 Jul 2023 07:01  -  24 Jul 2023 07:00  --------------------------------------------------------  IN: 1204 mL / OUT: 0 mL / NET: 1204 mL                          15.8   9.94  )-----------( 169      ( 23 Jul 2023 01:11 )             47.7     07-23    140  |  104  |  21  ----------------------------<  123<H>  3.6   |  23  |  1.04    Ca    9.1      23 Jul 2023 01:12  Phos  3.0     07-23  Mg     2.4     07-23    TPro  6.5  /  Alb  3.8  /  TBili  0.7  /  DBili  x   /  AST  67<H>  /  ALT  28  /  AlkPhos  93  07-23    PT/INR - ( 23 Jul 2023 01:12 )   PT: 12.4 sec;   INR: 1.08 ratio         PTT - ( 23 Jul 2023 22:07 )  PTT:63.1 sec  Creatinine Trend: 1.04<--, 1.14<--, 0.91<--, 1.04<--, 1.09<--, 1.01<--  CARDIAC MARKERS ( 22 Jul 2023 20:50 )  x     / x     / 381 U/L / x     / 62.2 ng/mL  CARDIAC MARKERS ( 22 Jul 2023 13:44 )  x     / x     / 389 U/L / x     / 72.5 ng/mL    ASSESSMENT/PLAN: 67M w CAD s/p multiple PCI last 2019, HTN, AF on eliquis, HL here w CP, found to have STEMI. Now s/p POBA to RCA with definitive revasc plan pending.   -tele  -cont asa, plavix, eliquis  -cont atorva 80  -cont coreg 3.125 and losartan  -TTE pending  -appreciate CHIP team recs re: definitive revascularization planning    ***    Luis Melo MD, MPhil, [facility]  Cardiologist, [facility]  ; [employer] at [facility]  email: [email or web address] and Cardiovascular [facility] on-service contact/call information, go to [email or web address] and use "[email or web address]" to login.  Outpatient Cardiology appointments, call  [phone] to arrange with a colleague; I do not have outpatient Cardiology clinic.

## 2023-07-24 NOTE — PROGRESS NOTE ADULT - SUBJECTIVE AND OBJECTIVE BOX
JOVANI VIVEROS  67y Male  MRN:[id number]    Patient is a 67y old  Male who presents with a chief complaint of STEMI (24 Jul 2023 08:27)    HPI:  67-year-old male w/ pmhx of  CAD with (10 stents last one placed in 2019) HTN, A-fib(on Eliquis), HLD presenting with a chief complaint of chest pain.  Patient was waking up sleep with midsternal chest pain.  Patient reports that he had diaphoresis at this time.  Patient reports pain that is radiating to bilateral arms.  The patient denies any nausea or vomiting.  Patient states that he has had an MI back in 2007. Pt also reports shortness of breath. The patient took nitroglycerin metoprolol at home for symptoms. The patient denies nausea, vomiting, headache, visual changes, abdominal pain, numbness.   (22 Jul 2023 10:03)      Patient seen and evaluated at bedside. No acute events overnight except as noted.    Interval HPI: pt s/p POBA to RCA    PAST MEDICAL & SURGICAL HISTORY:  Hypercholesteremia      HTN (hypertension)      H/O heart artery stent  2008, 2012      Asthma      MI (myocardial infarction)  2008      Melanoma      Ankle fracture      Atrial fibrillation  On Xarelto      No Past Surgical History      S/P angioplasty with stent      Melanoma  removal      History of coronary artery stent placement  2007, 2012, 2013          REVIEW OF SYSTEMS:  as per hpi     VITALS:  Vital Signs Last 24 Hrs  T(C): 36.9 (24 Jul 2023 04:28), Max: 36.9 (23 Jul 2023 18:00)  T(F): 98.4 (24 Jul 2023 04:28), Max: 98.4 (23 Jul 2023 18:00)  HR: 65 (24 Jul 2023 04:28) (55 - 78)  BP: 119/70 (24 Jul 2023 04:28) (119/70 - 165/83)  BP(mean): 107 (23 Jul 2023 17:00) (93 - 118)  RR: 18 (24 Jul 2023 04:28) (15 - 20)  SpO2: 96% (24 Jul 2023 04:28) (95% - 99%)    Parameters below as of 24 Jul 2023 04:28  Patient On (Oxygen Delivery Method): room air      CAPILLARY BLOOD GLUCOSE        I&O's Summary    23 Jul 2023 07:01  -  24 Jul 2023 07:00  --------------------------------------------------------  IN: 1204 mL / OUT: 0 mL / NET: 1204 mL    24 Jul 2023 07:01  -  24 Jul 2023 11:38  --------------------------------------------------------  IN: 240 mL / OUT: 0 mL / NET: 240 mL        PHYSICAL EXAM:  GENERAL: NAD, well-developed  HEAD:  Atraumatic, Normocephalic  EYES: EOMI, PERRLA, conjunctiva and sclera clear  NECK: Supple, No JVD  CHEST/LUNG: Clear to auscultation bilaterally; No wheeze  HEART: S1, S2; No murmurs, rubs, or gallops  ABDOMEN: Soft, Nontender, Nondistended; Bowel sounds present  EXTREMITIES:  2+ Peripheral Pulses, No clubbing, cyanosis, or edema  PSYCH: Normal affect  NEUROLOGY: AAOX3; non-focal  SKIN: No rashes or lesions    Consultant(s) Notes Reviewed:  [x ] YES  [ ] NO  Care Discussed with Consultants/Other Providers [ x] YES  [ ] NO    MEDS:  MEDICATIONS  (STANDING):  aspirin  chewable 81 milliGRAM(s) Oral daily  atorvastatin 80 milliGRAM(s) Oral at bedtime  carvedilol 3.125 milliGRAM(s) Oral every 12 hours  clopidogrel Tablet 75 milliGRAM(s) Oral daily  finasteride 5 milliGRAM(s) Oral daily  heparin  Infusion 1000 Unit(s)/Hr (14 mL/Hr) IV Continuous <Continuous>  valsartan 80 milliGRAM(s) Oral daily    MEDICATIONS  (PRN):    ALLERGIES:  contrast dye- itch/rash (Other)  penicillin (Anaphylaxis)  Shrimp (Anaphylaxis)      LABS:                        15.8   9.94  )-----------( 169      ( 23 Jul 2023 01:11 )             47.7     07-23    140  |  104  |  21  ----------------------------<  123<H>  3.6   |  23  |  1.04    Ca    9.1      23 Jul 2023 01:12  Phos  3.0     07-23  Mg     2.4     07-23    TPro  6.5  /  Alb  3.8  /  TBili  0.7  /  DBili  x   /  AST  67<H>  /  ALT  28  /  AlkPhos  93  07-23    PT/INR - ( 23 Jul 2023 01:12 )   PT: 12.4 sec;   INR: 1.08 ratio         PTT - ( 24 Jul 2023 07:14 )  PTT:44.8 sec  CARDIAC MARKERS ( 22 Jul 2023 20:50 )  x     / x     / 381 U/L / x     / 62.2 ng/mL  CARDIAC MARKERS ( 22 Jul 2023 13:44 )  x     / x     / 389 U/L / x     / 72.5 ng/mL      LIVER FUNCTIONS - ( 23 Jul 2023 01:12 )  Alb: 3.8 g/dL / Pro: 6.5 g/dL / ALK PHOS: 93 U/L / ALT: 28 U/L / AST: 67 U/L / GGT: x           Urinalysis Basic - ( 23 Jul 2023 01:12 )    Color: x / Appearance: x / SG: x / pH: x  Gluc: 123 mg/dL / Ketone: x  / Bili: x / Urobili: x   Blood: x / Protein: x / Nitrite: x   Leuk Esterase: x / RBC: x / WBC x   Sq Epi: x / Non Sq Epi: x / Bacteria: x     < from: Xray Chest 1 View- PORTABLE-Urgent (07.22.23 @ 05:41) >    IMPRESSION:  Clear lungs.    --- End of Report ---    < end of copied text >  < from: TTE W or WO Ultrasound Enhancing Agent (07.22.23 @ 08:47) >  _________________  CONCLUSIONS:      1. Small left ventricular cavity size. The left ventricular wall thickness is mildly increased. The left ventricular systolic function is mildly decreased with an ejection fraction of 47 % by Molina's method of disks. There are regional wall motion abnormalities.   2. Basal and mid inferior wall and basal and mid inferolateral wall are abnormal.   3. There is mild (grade 1) left ventricular diastolic dysfunction, with normal filling pressure. Analysis of left ventricular diastolic function and filling pressure is made challenging by the presence of merged E and A wave.   4. Normal right ventricular cavity size, normal right ventricular wall thickness and reduced systolic right ventricular function.   5. There is hypokinesis of the right ventricular free wall and apex.   6. There is trace tricuspid regurgitation. Estimated pulmonary artery systolic pressure is 32 mmHg.   7. No evidence of aortic regurgitation.   8. No pericardial effusion seen.   9. Compared to the transthoracic echocardiogram performed on 7/12/2023 There is no change in LVEF however, there is more evident inferior and inferolateral wall motion abnormalities.    _____________________________    < end of copied text >

## 2023-07-24 NOTE — PROGRESS NOTE ADULT - ASSESSMENT
HPI:  67-year-old male w/ pmhx of  CAD with (10 stents last one placed in 2019) HTN, A-fib(on Eliquis), HLD presenting with a chief complaint of chest pain.  Patient was waking up sleep with midsternal chest pain.  Patient reports that he had diaphoresis at this time.  Patient reports pain that is radiating to bilateral arms.  The patient denies any nausea or vomiting.  Patient states that he has had an MI back in 2007. Pt also reports shortness of breath. The patient took nitroglycerin metoprolol at home for symptoms. The patient denies nausea, vomiting, headache, visual changes, abdominal pain, numbness.  Transferred to NS for C s/p C POBA to RCA    (22 Jul 2023 10:03)        s/p cardiac cath on 7/22/23 with POBA to RCA in the setting of STEMI    ECG: SR with 70-80's with occasional PACs    tele interpretation SR with 70-80's with occasional PACs    Radial:   Right wrist stable w/ bleeding or hematoma; site soft, non tender.  Right radial pulse palpable +2.  Denies chest pain, denies right wrist/arm/hand:  pain, numbness, or tingling     - Reviewed and reinforced with patient:  wound care instructions, activities dos and dont's, medication compliance specifically antiplatelet therapy given stent/s.    - Patient aware to take DAPT  as prescribed and DO NOT STOP taking without consulting cardiologist first or STENT/s WILL CLOSE  - Reviewed and reinforced with patient:  site complications ( eg: bleeding, excruciating pain at the procedural site, large swelling-golf ball size-  extremity numbness, tingling, temperature change), or CHEST PAIN; pt aware that if any of those occur he/she must call cardiologist IMMEDIATELY or 911 or go to nearest emergency room   - Reviewed and reinforced a heart healthy diet, Smoking Cessation  - Patient verbalizes understanding of ALL OF THE ABOVE, and gives positive feedback   cont DAPT   cont antihypertensives ( specify BB, ACE, etc.), check flow sheet and report BP/HR trend   cont statin   please make sure DAPT is prescribed to pt's preferred pharmacy on dc   -triple therapy for a week, then stop ASA. Continue Plavix and Eliquis after a week  -heparin gtt for AC for now.  Keep Mg >2 K >4  f/u appt in 2 weeks post dc with oupt cardiologist Dr. Colón.   Pt will be seen by Dr. Esteves for discussion of  PCI.  for all  general cardiology questions please contact patient's primary cards team   all other care as per primary medicine  team     ADITHYA Cordon,  Invasive Cardiology

## 2023-07-25 ENCOUNTER — INPATIENT (INPATIENT)
Facility: HOSPITAL | Age: 68
LOS: 1 days | Discharge: HOME | DRG: 309 | End: 2023-07-27
Attending: INTERNAL MEDICINE | Admitting: INTERNAL MEDICINE
Payer: COMMERCIAL

## 2023-07-25 ENCOUNTER — APPOINTMENT (OUTPATIENT)
Dept: CARDIOLOGY | Facility: CLINIC | Age: 68
End: 2023-07-25

## 2023-07-25 ENCOUNTER — TRANSCRIPTION ENCOUNTER (OUTPATIENT)
Age: 68
End: 2023-07-25

## 2023-07-25 VITALS — HEART RATE: 70 BPM | SYSTOLIC BLOOD PRESSURE: 138 MMHG | DIASTOLIC BLOOD PRESSURE: 88 MMHG

## 2023-07-25 VITALS
WEIGHT: 218.26 LBS | OXYGEN SATURATION: 96 % | TEMPERATURE: 98 F | HEIGHT: 70 IN | RESPIRATION RATE: 20 BRPM | DIASTOLIC BLOOD PRESSURE: 97 MMHG | SYSTOLIC BLOOD PRESSURE: 141 MMHG | HEART RATE: 123 BPM

## 2023-07-25 DIAGNOSIS — Z98.62 PERIPHERAL VASCULAR ANGIOPLASTY STATUS: ICD-10-CM

## 2023-07-25 DIAGNOSIS — C43.9 MALIGNANT MELANOMA OF SKIN, UNSPECIFIED: Chronic | ICD-10-CM

## 2023-07-25 DIAGNOSIS — Z95.5 PRESENCE OF CORONARY ANGIOPLASTY IMPLANT AND GRAFT: Chronic | ICD-10-CM

## 2023-07-25 DIAGNOSIS — I22.1 SUBSEQUENT ST ELEVATION (STEMI) MYOCARDIAL INFARCTION OF INFERIOR WALL: ICD-10-CM

## 2023-07-25 LAB
ANION GAP SERPL CALC-SCNC: 10 MMOL/L — SIGNIFICANT CHANGE UP (ref 5–17)
APTT BLD: 67.5 SEC — HIGH (ref 27.5–35.5)
BUN SERPL-MCNC: 19 MG/DL — SIGNIFICANT CHANGE UP (ref 7–23)
CALCIUM SERPL-MCNC: 9.3 MG/DL — SIGNIFICANT CHANGE UP (ref 8.4–10.5)
CHLORIDE SERPL-SCNC: 104 MMOL/L — SIGNIFICANT CHANGE UP (ref 96–108)
CO2 SERPL-SCNC: 25 MMOL/L — SIGNIFICANT CHANGE UP (ref 22–31)
CREAT SERPL-MCNC: 1.03 MG/DL — SIGNIFICANT CHANGE UP (ref 0.5–1.3)
EGFR: 80 ML/MIN/1.73M2 — SIGNIFICANT CHANGE UP
GLUCOSE SERPL-MCNC: 97 MG/DL — SIGNIFICANT CHANGE UP (ref 70–99)
HCT VFR BLD CALC: 47.2 % — SIGNIFICANT CHANGE UP (ref 39–50)
HGB BLD-MCNC: 15.6 G/DL — SIGNIFICANT CHANGE UP (ref 13–17)
MAGNESIUM SERPL-MCNC: 2.1 MG/DL — SIGNIFICANT CHANGE UP (ref 1.6–2.6)
MCHC RBC-ENTMCNC: 29.8 PG — SIGNIFICANT CHANGE UP (ref 27–34)
MCHC RBC-ENTMCNC: 33.1 GM/DL — SIGNIFICANT CHANGE UP (ref 32–36)
MCV RBC AUTO: 90.2 FL — SIGNIFICANT CHANGE UP (ref 80–100)
NRBC # BLD: 0 /100 WBCS — SIGNIFICANT CHANGE UP (ref 0–0)
PHOSPHATE SERPL-MCNC: 3.1 MG/DL — SIGNIFICANT CHANGE UP (ref 2.5–4.5)
PLATELET # BLD AUTO: 165 K/UL — SIGNIFICANT CHANGE UP (ref 150–400)
POTASSIUM SERPL-MCNC: 4.3 MMOL/L — SIGNIFICANT CHANGE UP (ref 3.5–5.3)
POTASSIUM SERPL-SCNC: 4.3 MMOL/L — SIGNIFICANT CHANGE UP (ref 3.5–5.3)
RBC # BLD: 5.23 M/UL — SIGNIFICANT CHANGE UP (ref 4.2–5.8)
RBC # FLD: 13.1 % — SIGNIFICANT CHANGE UP (ref 10.3–14.5)
SODIUM SERPL-SCNC: 139 MMOL/L — SIGNIFICANT CHANGE UP (ref 135–145)
WBC # BLD: 6.57 K/UL — SIGNIFICANT CHANGE UP (ref 3.8–10.5)
WBC # FLD AUTO: 6.57 K/UL — SIGNIFICANT CHANGE UP (ref 3.8–10.5)

## 2023-07-25 PROCEDURE — 86901 BLOOD TYPING SEROLOGIC RH(D): CPT

## 2023-07-25 PROCEDURE — 99233 SBSQ HOSP IP/OBS HIGH 50: CPT

## 2023-07-25 PROCEDURE — 85018 HEMOGLOBIN: CPT

## 2023-07-25 PROCEDURE — 71045 X-RAY EXAM CHEST 1 VIEW: CPT

## 2023-07-25 PROCEDURE — C9606: CPT | Mod: RC

## 2023-07-25 PROCEDURE — C1887: CPT

## 2023-07-25 PROCEDURE — 84100 ASSAY OF PHOSPHORUS: CPT

## 2023-07-25 PROCEDURE — 82947 ASSAY GLUCOSE BLOOD QUANT: CPT

## 2023-07-25 PROCEDURE — 80053 COMPREHEN METABOLIC PANEL: CPT

## 2023-07-25 PROCEDURE — 85025 COMPLETE CBC W/AUTO DIFF WBC: CPT

## 2023-07-25 PROCEDURE — 84132 ASSAY OF SERUM POTASSIUM: CPT

## 2023-07-25 PROCEDURE — C1725: CPT

## 2023-07-25 PROCEDURE — 86900 BLOOD TYPING SEROLOGIC ABO: CPT

## 2023-07-25 PROCEDURE — 82803 BLOOD GASES ANY COMBINATION: CPT

## 2023-07-25 PROCEDURE — 36415 COLL VENOUS BLD VENIPUNCTURE: CPT

## 2023-07-25 PROCEDURE — 83880 ASSAY OF NATRIURETIC PEPTIDE: CPT

## 2023-07-25 PROCEDURE — 85014 HEMATOCRIT: CPT

## 2023-07-25 PROCEDURE — C1894: CPT

## 2023-07-25 PROCEDURE — 85730 THROMBOPLASTIN TIME PARTIAL: CPT

## 2023-07-25 PROCEDURE — 85610 PROTHROMBIN TIME: CPT

## 2023-07-25 PROCEDURE — 96374 THER/PROPH/DIAG INJ IV PUSH: CPT

## 2023-07-25 PROCEDURE — 93458 L HRT ARTERY/VENTRICLE ANGIO: CPT | Mod: 59

## 2023-07-25 PROCEDURE — 86850 RBC ANTIBODY SCREEN: CPT

## 2023-07-25 PROCEDURE — 82330 ASSAY OF CALCIUM: CPT

## 2023-07-25 PROCEDURE — 82435 ASSAY OF BLOOD CHLORIDE: CPT

## 2023-07-25 PROCEDURE — 99285 EMERGENCY DEPT VISIT HI MDM: CPT

## 2023-07-25 PROCEDURE — 83036 HEMOGLOBIN GLYCOSYLATED A1C: CPT

## 2023-07-25 PROCEDURE — 84295 ASSAY OF SERUM SODIUM: CPT

## 2023-07-25 PROCEDURE — 93306 TTE W/DOPPLER COMPLETE: CPT

## 2023-07-25 PROCEDURE — 83735 ASSAY OF MAGNESIUM: CPT

## 2023-07-25 PROCEDURE — 82553 CREATINE MB FRACTION: CPT

## 2023-07-25 PROCEDURE — 96375 TX/PRO/DX INJ NEW DRUG ADDON: CPT

## 2023-07-25 PROCEDURE — 99291 CRITICAL CARE FIRST HOUR: CPT | Mod: 25

## 2023-07-25 PROCEDURE — 85520 HEPARIN ASSAY: CPT

## 2023-07-25 PROCEDURE — 84443 ASSAY THYROID STIM HORMONE: CPT

## 2023-07-25 PROCEDURE — 80061 LIPID PANEL: CPT

## 2023-07-25 PROCEDURE — 82550 ASSAY OF CK (CPK): CPT

## 2023-07-25 PROCEDURE — 85027 COMPLETE CBC AUTOMATED: CPT

## 2023-07-25 PROCEDURE — 83605 ASSAY OF LACTIC ACID: CPT

## 2023-07-25 PROCEDURE — 84484 ASSAY OF TROPONIN QUANT: CPT

## 2023-07-25 PROCEDURE — 80048 BASIC METABOLIC PNL TOTAL CA: CPT

## 2023-07-25 PROCEDURE — 93005 ELECTROCARDIOGRAM TRACING: CPT

## 2023-07-25 PROCEDURE — C1769: CPT

## 2023-07-25 RX ORDER — CLOPIDOGREL BISULFATE 75 MG/1
1 TABLET, FILM COATED ORAL
Qty: 30 | Refills: 0
Start: 2023-07-25 | End: 2023-08-23

## 2023-07-25 RX ORDER — CARVEDILOL PHOSPHATE 80 MG/1
1 CAPSULE, EXTENDED RELEASE ORAL
Qty: 60 | Refills: 0
Start: 2023-07-25 | End: 2023-08-23

## 2023-07-25 RX ORDER — BISOPROLOL FUMARATE 10 MG/1
10 TABLET, FILM COATED ORAL
Qty: 90 | Refills: 3 | Status: DISCONTINUED | COMMUNITY
Start: 2019-11-22 | End: 2023-07-25

## 2023-07-25 RX ORDER — ISOSORBIDE MONONITRATE 60 MG/1
1 TABLET, EXTENDED RELEASE ORAL
Refills: 0 | DISCHARGE

## 2023-07-25 RX ADMIN — Medication 81 MILLIGRAM(S): at 13:49

## 2023-07-25 RX ADMIN — FINASTERIDE 5 MILLIGRAM(S): 5 TABLET, FILM COATED ORAL at 13:49

## 2023-07-25 RX ADMIN — VALSARTAN 80 MILLIGRAM(S): 80 TABLET ORAL at 05:24

## 2023-07-25 RX ADMIN — CARVEDILOL PHOSPHATE 3.12 MILLIGRAM(S): 80 CAPSULE, EXTENDED RELEASE ORAL at 05:25

## 2023-07-25 RX ADMIN — CLOPIDOGREL BISULFATE 75 MILLIGRAM(S): 75 TABLET, FILM COATED ORAL at 13:48

## 2023-07-25 NOTE — PROGRESS NOTE ADULT - SUBJECTIVE AND OBJECTIVE BOX
INTERVAL EVENTS/SUBJ:  No events     Home Medications:  aspirin 81 mg oral tablet: 1 tab(s) orally once a day (12 Jul 2023 12:25)  citracal: 1 tab once a day (12 Jul 2023 12:25)  colace: 1 cap once a day (12 Jul 2023 12:25)  Crestor 40 mg oral tablet: 1 tab(s) orally every other day (12 Jul 2023 12:25)  finasteride 5 mg oral tablet: 1 tab(s) orally once a day (12 Jul 2023 12:25)  isosorbide mononitrate 60 mg oral tablet, extended release: 1 tab(s) orally once a day (12 Jul 2023 12:25)  ubiquinone 200 mg oral capsule: 200 milligram(s) orally once a day (12 Jul 2023 12:25)  valsartan-hydrochlorothiazide 320 mg-25 mg oral tablet: 1 tab(s) orally once a day (12 Jul 2023 12:25)  Vitamin D3 2000 intl units oral capsule: 1 cap(s) orally once a day (12 Jul 2023 12:17)      MEDICATIONS  (STANDING):  aspirin  chewable 81 milliGRAM(s) Oral daily  atorvastatin 80 milliGRAM(s) Oral at bedtime  carvedilol 3.125 milliGRAM(s) Oral every 12 hours  clopidogrel Tablet 75 milliGRAM(s) Oral daily  finasteride 5 milliGRAM(s) Oral daily  heparin  Infusion 1000 Unit(s)/Hr (14 mL/Hr) IV Continuous <Continuous>  valsartan 80 milliGRAM(s) Oral daily    MEDICATIONS  (PRN):      Vital Signs Last 24 Hrs  T(C): 36.7 (25 Jul 2023 04:37), Max: 37 (24 Jul 2023 20:08)  T(F): 98.1 (25 Jul 2023 04:37), Max: 98.6 (24 Jul 2023 20:08)  HR: 67 (25 Jul 2023 04:37) (67 - 96)  BP: 136/65 (25 Jul 2023 04:37) (119/70 - 136/65)  BP(mean): 86 (24 Jul 2023 18:23) (86 - 86)  RR: 18 (25 Jul 2023 04:37) (17 - 18)  SpO2: 97% (25 Jul 2023 04:37) (96% - 97%)    Parameters below as of 25 Jul 2023 04:37  Patient On (Oxygen Delivery Method): room air        REVIEW OF SYSTEMS:  As per HPI, otherwise unremarkable.     PHYSICAL EXAM:  Constitutional/Appearance: Normal, Well-developed  HEENT:   Normal oral mucosa, no drainage or redness, supple neck  Lymphatic: No lymphadenopathy  Cardiovascular: Normal S1 S2, No edema, II/VI CANDIDO  Respiratory: Lungs clear to auscultation, respirations non-labored  Psychiatry: A & O x 3, appropriate affect.   Gastrointestinal:  Soft, Non-tender, no distention  Skin: No rashes, No ecchymoses, No cyanosis	  Neurologic: Non-focal, Alert and oriented x 3  Extremities: Normal range of motion  Vascular: Peripheral pulses palpable 2+ bilaterally (radial)    LABS:  CBC Full  -  ( 25 Jul 2023 06:52 )  WBC Count : 6.57 K/uL  RBC Count : 5.23 M/uL  Hemoglobin : 15.6 g/dL  Hematocrit : 47.2 %  Platelet Count - Automated : 165 K/uL  Mean Cell Volume : 90.2 fl  Mean Cell Hemoglobin : 29.8 pg  Mean Cell Hemoglobin Concentration : 33.1 gm/dL  Auto Neutrophil # : x  Auto Lymphocyte # : x  Auto Monocyte # : x  Auto Eosinophil # : x  Auto Basophil # : x  Auto Neutrophil % : x  Auto Lymphocyte % : x  Auto Monocyte % : x  Auto Eosinophil % : x  Auto Basophil % : x      07-25    139  |  104  |  19  ----------------------------<  97  4.3   |  25  |  1.03    Ca    9.3      25 Jul 2023 06:54  Phos  3.1     07-25  Mg     2.1     07-25      TTE 7/25/23   1. Small left ventricular cavity size. The left ventricular wall thickness is mildly increased. The left ventricular systolic function is mildly decreased with an ejection fraction of 47 % by Molina's method of disks. There are regional wall motion abnormalities.   2. Basal and mid inferior wall and basal and mid inferolateral wall are abnormal.   3. There is mild (grade 1) left ventricular diastolic dysfunction, with normal filling pressure. Analysis of left ventricular diastolic function and filling pressure is made challenging by the presence of merged E and A wave.   4. Normal right ventricular cavity size, normal right ventricular wall thickness and reduced systolic right ventricular function.   5. There is hypokinesis of the right ventricular free wall and apex.   6. There is trace tricuspid regurgitation. Estimated pulmonary artery systolic pressure is 32 mmHg.   7. No evidence of aortic regurgitation.   8. No pericardial effusion seen.   9. Compared to the transthoracic echocardiogram performed on 7/12/2023 There is no change in LVEF however, there is more evident inferior and inferolateral wall motion abnormalities.        IMPRESSION AND PLAN: 67M w CAD s/p multiple PCI last 2019, HTN, AF on eliquis, HL here w CP, found to have STEMI. Now s/p POBA to RCA with definitive revasc plan pending.   -tele  -cont asa, plavix, eliquis  -cont atorva 80  -cont coreg 3.125 and losartan  -TTE EF 47% w regional WMA  - PCI discussion ongoing      ***    Luis Melo MD, MPhil, [facility]  Cardiologist, [facility]  ; [employer] at [facility]  email: [email or web address] on-service contact/call information, go to [email or web address] and use "[employer]" to login.  Outpatient Cardiology appointments, call  [phone] to arrange with a colleague; I do not have outpatient Cardiology clinic.

## 2023-07-25 NOTE — DISCHARGE NOTE NURSING/CASE MANAGEMENT/SOCIAL WORK - NSDCPEFALRISK_GEN_ALL_CORE
For information on Fall & Injury Prevention, visit: [email or web address] OR  [email or web address] OR  https://www.cdc.gov/steadi/patient.html

## 2023-07-25 NOTE — DISCHARGE NOTE NURSING/CASE MANAGEMENT/SOCIAL WORK - PATIENT PORTAL LINK FT
You can access the FollowMyHealth Patient Portal offered by [facility] by registering at the following website: [email or web address]. By joining [facility]’s FollowMyHealth portal, you will also be able to view your health information using other applications (apps) compatible with our system.

## 2023-07-25 NOTE — PROGRESS NOTE ADULT - SUBJECTIVE AND OBJECTIVE BOX
JOVANI VIVEROS  67y Male  MRN:[id number]    Patient is a 67y old  Male who presents with a chief complaint of STEMI (24 Jul 2023 08:27)    HPI:  67-year-old male w/ pmhx of  CAD with (10 stents last one placed in 2019) HTN, A-fib(on Eliquis), HLD presenting with a chief complaint of chest pain.  Patient was waking up sleep with midsternal chest pain.  Patient reports that he had diaphoresis at this time.  Patient reports pain that is radiating to bilateral arms.  The patient denies any nausea or vomiting.  Patient states that he has had an MI back in 2007. Pt also reports shortness of breath. The patient took nitroglycerin metoprolol at home for symptoms. The patient denies nausea, vomiting, headache, visual changes, abdominal pain, numbness.   (22 Jul 2023 10:03)      Patient seen and evaluated at bedside. No acute events overnight except as noted.    Interval HPI: pt s/p POBA to RCA    PAST MEDICAL & SURGICAL HISTORY:  Hypercholesteremia      HTN (hypertension)      H/O heart artery stent  2008, 2012      Asthma      MI (myocardial infarction)  2008      Melanoma      Ankle fracture      Atrial fibrillation  On Xarelto      No Past Surgical History      S/P angioplasty with stent      Melanoma  removal      History of coronary artery stent placement  2007, 2012, 2013          REVIEW OF SYSTEMS:  as per hpi     VITALS:   Vital Signs Last 24 Hrs  T(C): 36.7 (25 Jul 2023 04:37), Max: 37 (24 Jul 2023 20:08)  T(F): 98.1 (25 Jul 2023 04:37), Max: 98.6 (24 Jul 2023 20:08)  HR: 67 (25 Jul 2023 04:37) (67 - 96)  BP: 136/65 (25 Jul 2023 04:37) (119/70 - 136/65)  BP(mean): 86 (24 Jul 2023 18:23) (86 - 86)  RR: 18 (25 Jul 2023 04:37) (17 - 18)  SpO2: 97% (25 Jul 2023 04:37) (96% - 97%)    Parameters below as of 25 Jul 2023 04:37  Patient On (Oxygen Delivery Method): room air          PHYSICAL EXAM:  GENERAL: NAD, well-developed  HEAD:  Atraumatic, Normocephalic  EYES: EOMI, PERRLA, conjunctiva and sclera clear  NECK: Supple, No JVD  CHEST/LUNG: Clear to auscultation bilaterally; No wheeze  HEART: S1, S2; No murmurs, rubs, or gallops  ABDOMEN: Soft, Nontender, Nondistended; Bowel sounds present  EXTREMITIES:  2+ Peripheral Pulses, No clubbing, cyanosis, or edema  PSYCH: Normal affect  NEUROLOGY: AAOX3; non-focal  SKIN: No rashes or lesions    Consultant(s) Notes Reviewed:  [x ] YES  [ ] NO  Care Discussed with Consultants/Other Providers [ x] YES  [ ] NO    MEDS:   MEDICATIONS  (STANDING):  aspirin  chewable 81 milliGRAM(s) Oral daily  atorvastatin 80 milliGRAM(s) Oral at bedtime  carvedilol 3.125 milliGRAM(s) Oral every 12 hours  clopidogrel Tablet 75 milliGRAM(s) Oral daily  finasteride 5 milliGRAM(s) Oral daily  heparin  Infusion 1000 Unit(s)/Hr (14 mL/Hr) IV Continuous <Continuous>  valsartan 80 milliGRAM(s) Oral daily    MEDICATIONS  (PRN):      ALLERGIES:  contrast dye- itch/rash (Other)  penicillin (Anaphylaxis)  Shrimp (Anaphylaxis)      LABS:                                        15.6   6.57  )-----------( 165      ( 25 Jul 2023 06:52 )             47.2   07-25    139  |  104  |  19  ----------------------------<  97  4.3   |  25  |  1.03    Ca    9.3      25 Jul 2023 06:54  Phos  3.1     07-25  Mg     2.1     07-25         < from: Xray Chest 1 View- PORTABLE-Urgent (07.22.23 @ 05:41) >    IMPRESSION:  Clear lungs.    --- End of Report ---    < end of copied text >  < from: TTE W or WO Ultrasound Enhancing Agent (07.22.23 @ 08:47) >  _________________  CONCLUSIONS:      1. Small left ventricular cavity size. The left ventricular wall thickness is mildly increased. The left ventricular systolic function is mildly decreased with an ejection fraction of 47 % by Molina's method of disks. There are regional wall motion abnormalities.   2. Basal and mid inferior wall and basal and mid inferolateral wall are abnormal.   3. There is mild (grade 1) left ventricular diastolic dysfunction, with normal filling pressure. Analysis of left ventricular diastolic function and filling pressure is made challenging by the presence of merged E and A wave.   4. Normal right ventricular cavity size, normal right ventricular wall thickness and reduced systolic right ventricular function.   5. There is hypokinesis of the right ventricular free wall and apex.   6. There is trace tricuspid regurgitation. Estimated pulmonary artery systolic pressure is 32 mmHg.   7. No evidence of aortic regurgitation.   8. No pericardial effusion seen.   9. Compared to the transthoracic echocardiogram performed on 7/12/2023 There is no change in LVEF however, there is more evident inferior and inferolateral wall motion abnormalities.    _____________________________    < end of copied text >

## 2023-07-25 NOTE — PROGRESS NOTE ADULT - ASSESSMENT
67 male h/o cad s/p pci, htn, afib on eliquis, chol, here with STEMI    STEMI  s/p cath with with POBA to RCA  cards f/u  asa/plavix/statin  BB  echo noted  d/w cards  no further intervention planned at this time  outpt cards f/u    afib  change AC to eliquis  cont BB  tele    HTN  cont current rx  monitor bp    chol  cont statin    dc planning  as per cards -triple therapy for a week, then stop ASA. Continue Plavix and Eliquis after a week        Advanced care planning was discussed with patient and family.  Advanced care planning forms were reviewed and discussed as appropriate.  Differential diagnosis and plan of care discussed with patient after the evaluation.   Pain assessed and judicious use of narcotics when appropriate was discussed.  Importance of Fall prevention discussed.  Counseling on Smoking and Alcohol cessation was offered when appropriate.  Counseling on Diet, exercise, and medication compliance was done.       Approx 60 minutes spent.

## 2023-07-26 DIAGNOSIS — I48.91 UNSPECIFIED ATRIAL FIBRILLATION: ICD-10-CM

## 2023-07-26 LAB
ALBUMIN SERPL ELPH-MCNC: 4.1 G/DL — SIGNIFICANT CHANGE UP (ref 3.3–5)
ALP SERPL-CCNC: 107 U/L — SIGNIFICANT CHANGE UP (ref 40–120)
ALT FLD-CCNC: 32 U/L — SIGNIFICANT CHANGE UP (ref 10–45)
ANION GAP SERPL CALC-SCNC: 15 MMOL/L — SIGNIFICANT CHANGE UP (ref 5–17)
APTT BLD: 33.2 SEC — SIGNIFICANT CHANGE UP (ref 24.5–35.6)
AST SERPL-CCNC: 32 U/L — SIGNIFICANT CHANGE UP (ref 10–40)
BASE EXCESS BLDV CALC-SCNC: 1.6 MMOL/L — SIGNIFICANT CHANGE UP (ref -2–3)
BASOPHILS # BLD AUTO: 0.06 K/UL — SIGNIFICANT CHANGE UP (ref 0–0.2)
BASOPHILS NFR BLD AUTO: 0.6 % — SIGNIFICANT CHANGE UP (ref 0–2)
BILIRUB SERPL-MCNC: 0.9 MG/DL — SIGNIFICANT CHANGE UP (ref 0.2–1.2)
BUN SERPL-MCNC: 19 MG/DL — SIGNIFICANT CHANGE UP (ref 7–23)
CA-I SERPL-SCNC: 1.18 MMOL/L — SIGNIFICANT CHANGE UP (ref 1.15–1.33)
CALCIUM SERPL-MCNC: 9.6 MG/DL — SIGNIFICANT CHANGE UP (ref 8.4–10.5)
CHLORIDE BLDV-SCNC: 104 MMOL/L — SIGNIFICANT CHANGE UP (ref 96–108)
CHLORIDE SERPL-SCNC: 105 MMOL/L — SIGNIFICANT CHANGE UP (ref 96–108)
CK MB BLD-MCNC: 3.6 % — HIGH (ref 0–3.5)
CK MB CFR SERPL CALC: 4.8 NG/ML — SIGNIFICANT CHANGE UP (ref 0–6.7)
CK SERPL-CCNC: 135 U/L — SIGNIFICANT CHANGE UP (ref 30–200)
CO2 BLDV-SCNC: 29 MMOL/L — HIGH (ref 22–26)
CO2 SERPL-SCNC: 21 MMOL/L — LOW (ref 22–31)
CREAT SERPL-MCNC: 1.07 MG/DL — SIGNIFICANT CHANGE UP (ref 0.5–1.3)
EGFR: 77 ML/MIN/1.73M2 — SIGNIFICANT CHANGE UP
EOSINOPHIL # BLD AUTO: 0.35 K/UL — SIGNIFICANT CHANGE UP (ref 0–0.5)
EOSINOPHIL NFR BLD AUTO: 3.8 % — SIGNIFICANT CHANGE UP (ref 0–6)
GAS PNL BLDV: 133 MMOL/L — LOW (ref 136–145)
GAS PNL BLDV: SIGNIFICANT CHANGE UP
GAS PNL BLDV: SIGNIFICANT CHANGE UP
GLUCOSE BLDV-MCNC: 109 MG/DL — HIGH (ref 70–99)
GLUCOSE SERPL-MCNC: 111 MG/DL — HIGH (ref 70–99)
HCO3 BLDV-SCNC: 27 MMOL/L — SIGNIFICANT CHANGE UP (ref 22–29)
HCT VFR BLD CALC: 48.9 % — SIGNIFICANT CHANGE UP (ref 39–50)
HCT VFR BLDA CALC: 50 % — SIGNIFICANT CHANGE UP (ref 39–51)
HGB BLD CALC-MCNC: 16.6 G/DL — SIGNIFICANT CHANGE UP (ref 12.6–17.4)
HGB BLD-MCNC: 16 G/DL — SIGNIFICANT CHANGE UP (ref 13–17)
IMM GRANULOCYTES NFR BLD AUTO: 0.3 % — SIGNIFICANT CHANGE UP (ref 0–0.9)
INR BLD: 1.02 RATIO — SIGNIFICANT CHANGE UP (ref 0.85–1.18)
LACTATE BLDV-MCNC: 1.2 MMOL/L — SIGNIFICANT CHANGE UP (ref 0.5–2)
LYMPHOCYTES # BLD AUTO: 1.98 K/UL — SIGNIFICANT CHANGE UP (ref 1–3.3)
LYMPHOCYTES # BLD AUTO: 21.2 % — SIGNIFICANT CHANGE UP (ref 13–44)
MCHC RBC-ENTMCNC: 29.5 PG — SIGNIFICANT CHANGE UP (ref 27–34)
MCHC RBC-ENTMCNC: 32.7 GM/DL — SIGNIFICANT CHANGE UP (ref 32–36)
MCV RBC AUTO: 90.1 FL — SIGNIFICANT CHANGE UP (ref 80–100)
MONOCYTES # BLD AUTO: 0.67 K/UL — SIGNIFICANT CHANGE UP (ref 0–0.9)
MONOCYTES NFR BLD AUTO: 7.2 % — SIGNIFICANT CHANGE UP (ref 2–14)
NEUTROPHILS # BLD AUTO: 6.23 K/UL — SIGNIFICANT CHANGE UP (ref 1.8–7.4)
NEUTROPHILS NFR BLD AUTO: 66.9 % — SIGNIFICANT CHANGE UP (ref 43–77)
NRBC # BLD: 0 /100 WBCS — SIGNIFICANT CHANGE UP (ref 0–0)
NT-PROBNP SERPL-SCNC: 412 PG/ML — HIGH (ref 0–300)
PCO2 BLDV: 45 MMHG — SIGNIFICANT CHANGE UP (ref 42–55)
PH BLDV: 7.39 — SIGNIFICANT CHANGE UP (ref 7.32–7.43)
PLATELET # BLD AUTO: 196 K/UL — SIGNIFICANT CHANGE UP (ref 150–400)
PO2 BLDV: 27 MMHG — SIGNIFICANT CHANGE UP (ref 25–45)
POTASSIUM BLDV-SCNC: 4.2 MMOL/L — SIGNIFICANT CHANGE UP (ref 3.5–5.1)
POTASSIUM SERPL-MCNC: 4.6 MMOL/L — SIGNIFICANT CHANGE UP (ref 3.5–5.3)
POTASSIUM SERPL-SCNC: 4.6 MMOL/L — SIGNIFICANT CHANGE UP (ref 3.5–5.3)
PROT SERPL-MCNC: 7.1 G/DL — SIGNIFICANT CHANGE UP (ref 6–8.3)
PROTHROM AB SERPL-ACNC: 11.8 SEC — SIGNIFICANT CHANGE UP (ref 9.5–13)
RBC # BLD: 5.43 M/UL — SIGNIFICANT CHANGE UP (ref 4.2–5.8)
RBC # FLD: 13.1 % — SIGNIFICANT CHANGE UP (ref 10.3–14.5)
SAO2 % BLDV: 45.2 % — LOW (ref 67–88)
SODIUM SERPL-SCNC: 141 MMOL/L — SIGNIFICANT CHANGE UP (ref 135–145)
TROPONIN T, HIGH SENSITIVITY RESULT: 1126 NG/L — HIGH (ref 0–51)
TROPONIN T, HIGH SENSITIVITY RESULT: 1255 NG/L — HIGH (ref 0–51)
WBC # BLD: 9.32 K/UL — SIGNIFICANT CHANGE UP (ref 3.8–10.5)
WBC # FLD AUTO: 9.32 K/UL — SIGNIFICANT CHANGE UP (ref 3.8–10.5)

## 2023-07-26 PROCEDURE — 99223 1ST HOSP IP/OBS HIGH 75: CPT

## 2023-07-26 PROCEDURE — 71045 X-RAY EXAM CHEST 1 VIEW: CPT | Mod: 26

## 2023-07-26 RX ORDER — ATORVASTATIN CALCIUM 80 MG/1
40 TABLET, FILM COATED ORAL AT BEDTIME
Refills: 0 | Status: DISCONTINUED | OUTPATIENT
Start: 2023-07-26 | End: 2023-07-27

## 2023-07-26 RX ORDER — ASPIRIN/CALCIUM CARB/MAGNESIUM 324 MG
81 TABLET ORAL DAILY
Refills: 0 | Status: DISCONTINUED | OUTPATIENT
Start: 2023-07-26 | End: 2023-07-27

## 2023-07-26 RX ORDER — CARVEDILOL PHOSPHATE 80 MG/1
3.12 CAPSULE, EXTENDED RELEASE ORAL EVERY 12 HOURS
Refills: 0 | Status: DISCONTINUED | OUTPATIENT
Start: 2023-07-26 | End: 2023-07-26

## 2023-07-26 RX ORDER — ACETAMINOPHEN 500 MG
975 TABLET ORAL ONCE
Refills: 0 | Status: DISCONTINUED | OUTPATIENT
Start: 2023-07-26 | End: 2023-07-26

## 2023-07-26 RX ORDER — APIXABAN 2.5 MG/1
5 TABLET, FILM COATED ORAL EVERY 12 HOURS
Refills: 0 | Status: DISCONTINUED | OUTPATIENT
Start: 2023-07-26 | End: 2023-07-27

## 2023-07-26 RX ORDER — CLOPIDOGREL BISULFATE 75 MG/1
75 TABLET, FILM COATED ORAL DAILY
Refills: 0 | Status: DISCONTINUED | OUTPATIENT
Start: 2023-07-26 | End: 2023-07-27

## 2023-07-26 RX ORDER — AMIODARONE HYDROCHLORIDE 400 MG/1
400 TABLET ORAL
Refills: 0 | Status: DISCONTINUED | OUTPATIENT
Start: 2023-07-26 | End: 2023-07-27

## 2023-07-26 RX ORDER — VALSARTAN 80 MG/1
320 TABLET ORAL DAILY
Refills: 0 | Status: DISCONTINUED | OUTPATIENT
Start: 2023-07-26 | End: 2023-07-27

## 2023-07-26 RX ORDER — FINASTERIDE 5 MG/1
5 TABLET, FILM COATED ORAL DAILY
Refills: 0 | Status: DISCONTINUED | OUTPATIENT
Start: 2023-07-26 | End: 2023-07-27

## 2023-07-26 RX ORDER — IBUPROFEN 200 MG
400 TABLET ORAL ONCE
Refills: 0 | Status: DISCONTINUED | OUTPATIENT
Start: 2023-07-26 | End: 2023-07-26

## 2023-07-26 RX ORDER — METOPROLOL TARTRATE 50 MG
5 TABLET ORAL ONCE
Refills: 0 | Status: COMPLETED | OUTPATIENT
Start: 2023-07-26 | End: 2023-07-26

## 2023-07-26 RX ORDER — METOPROLOL TARTRATE 50 MG
50 TABLET ORAL ONCE
Refills: 0 | Status: COMPLETED | OUTPATIENT
Start: 2023-07-26 | End: 2023-07-26

## 2023-07-26 RX ORDER — METOPROLOL TARTRATE 50 MG
50 TABLET ORAL
Refills: 0 | Status: DISCONTINUED | OUTPATIENT
Start: 2023-07-26 | End: 2023-07-27

## 2023-07-26 RX ADMIN — APIXABAN 5 MILLIGRAM(S): 2.5 TABLET, FILM COATED ORAL at 18:03

## 2023-07-26 RX ADMIN — FINASTERIDE 5 MILLIGRAM(S): 5 TABLET, FILM COATED ORAL at 11:58

## 2023-07-26 RX ADMIN — ATORVASTATIN CALCIUM 40 MILLIGRAM(S): 80 TABLET, FILM COATED ORAL at 22:01

## 2023-07-26 RX ADMIN — Medication 50 MILLIGRAM(S): at 01:55

## 2023-07-26 RX ADMIN — Medication 50 MILLIGRAM(S): at 18:03

## 2023-07-26 RX ADMIN — Medication 81 MILLIGRAM(S): at 11:58

## 2023-07-26 RX ADMIN — AMIODARONE HYDROCHLORIDE 400 MILLIGRAM(S): 400 TABLET ORAL at 18:03

## 2023-07-26 RX ADMIN — CLOPIDOGREL BISULFATE 75 MILLIGRAM(S): 75 TABLET, FILM COATED ORAL at 11:58

## 2023-07-26 RX ADMIN — Medication 5 MILLIGRAM(S): at 01:53

## 2023-07-26 NOTE — CONSULT NOTE ADULT - NS ATTEND AMEND GEN_ALL_CORE FT
Patient seen at bedside in ED. I had an extensive conversation with him about rate and rhythm control options for symptomatic pAF in the setting of a recent coronary intervention. He would like to pursue a catheter ablation. I explained to him that I would prefer to wait until he is further removed from his most recent coronary intervention. In the meantime, we discussed an AAD. His preference was to avoid staying in the hospital for three days for Sotalol or Tikosyn initiation. We agreed to start a short course of Amiodarone with the plan to stop this medication in three months and pursue an ablation. I reviewed the black box warnings with him. EP will follow.    SLAVA Olson

## 2023-07-26 NOTE — ED PROVIDER NOTE - PROGRESS NOTE DETAILS
Attending Dr. Chaparro: Cardiology at bedside. Rate control as tolerated. Troponin likely demand ischemia. Now in 110s-120s, bp remains stable. WIll give oral metoprolol and admit tele. Carol Nguyen DO (PGY3):   Attempted to call Dr. Bhatt for admission multiple times, multiple  messages the left without callback.   Spoke with hospitalist who will attempt to reach Dr. Bhatt on 1 more time, if no answer will admit to hospitalist. Pt most recent HR 60s, NSR, normotensive. Attending Dr. Chaparro: Case discussed with hospitalist on behalf of Dr. Bhatt who accepted patient for admission.

## 2023-07-26 NOTE — ED PROVIDER NOTE - PHYSICAL EXAMINATION
GENERAL: Awake. Alert. NAD. Well nourished.  HEENT: NC/AT, PERRL, EOMI, Conjunctiva pink, no scleral icterus. Airway patent. Moist mucous membranes.  LUNGS: CTAB. No wheezes or rales noted.  CARDIAC: Chest non-tender to palpation. tachycardia, irregular rhythm  ABDOMEN: No masses noted. Soft, NT, ND, no rebound, no guarding.  EXT: No edema, no calf tenderness, distal pulses 2+ bilaterally  NEURO: A&Ox3. Moving all extremities. Sensation and strength intact throughout.   SKIN: Warm and dry.   PSYCH: Normal affect.

## 2023-07-26 NOTE — H&P ADULT - NSHPPHYSICALEXAM_GEN_ALL_CORE
Vital Signs Last 24 Hrs  T(C): 36.4 (26 Jul 2023 07:21), Max: 36.5 (25 Jul 2023 23:35)  T(F): 97.5 (26 Jul 2023 07:21), Max: 97.7 (25 Jul 2023 23:35)  HR: 60 (26 Jul 2023 10:04) (60 - 146)  BP: 127/77 (26 Jul 2023 10:04) (117/77 - 170/146)  BP(mean): 155 (26 Jul 2023 01:47) (155 - 155)  RR: 18 (26 Jul 2023 10:04) (18 - 20)  SpO2: 95% (26 Jul 2023 10:04) (95% - 97%)    Parameters below as of 26 Jul 2023 10:04  Patient On (Oxygen Delivery Method): room air Vital Signs Last 24 Hrs  T(C): 36.4 (26 Jul 2023 07:21), Max: 36.5 (25 Jul 2023 23:35)  T(F): 97.5 (26 Jul 2023 07:21), Max: 97.7 (25 Jul 2023 23:35)  HR: 60 (26 Jul 2023 10:04) (60 - 146)  BP: 127/77 (26 Jul 2023 10:04) (117/77 - 170/146)  BP(mean): 155 (26 Jul 2023 01:47) (155 - 155)  RR: 18 (26 Jul 2023 10:04) (18 - 20)  SpO2: 95% (26 Jul 2023 10:04) (95% - 97%)    Parameters below as of 26 Jul 2023 10:04  Patient On (Oxygen Delivery Method): room air      PHYSICAL EXAM:  GENERAL: NAD, well-developed  HEAD:  Atraumatic, Normocephalic  EYES: EOMI, PERRLA, conjunctiva and sclera clear  NECK: Supple, No JVD  CHEST/LUNG: Clear to auscultation bilaterally; No wheeze  HEART: Regular rate and rhythm; No murmurs, rubs, or gallops  ABDOMEN: Soft, Nontender, Nondistended; Bowel sounds present  EXTREMITIES:  2+ Peripheral Pulses, No clubbing, cyanosis, or edema  PSYCH: AAOx3  NEUROLOGY: non-focal  SKIN: No rashes or lesions

## 2023-07-26 NOTE — ED PROVIDER NOTE - CLINICAL SUMMARY MEDICAL DECISION MAKING FREE TEXT BOX
67M PMH CAD with 10 stents last one placed in 2019, HTN, A-fib on eliquis, HLD Recent admission to see ICU for MI status post RCA POBA discharged yesterday presenting to the emergency department with 3 hours of acute onset palpitations associated with lightheadedness and dyspnea, no associated chest pain. Given hx and physical, ddx includes but is not limited to afib rvr, chf, metabolic derangement. Low concern for PE at this time, no dvt signs. Plan for ecg, labs, xr, cards cs, rate control, tba 67M PMH CAD with 10 stents last one placed in 2019, HTN, A-fib on eliquis, HLD Recent admission to see ICU for MI status post RCA POBA discharged yesterday presenting to the emergency department with 3 hours of acute onset palpitations associated with lightheadedness and dyspnea, no associated chest pain. Given hx and physical, ddx includes but is not limited to afib rvr, chf, metabolic derangement. Low concern for PE at this time, no dvt signs. Plan for ecg, labs, xr, cards cs, rate control, tba.

## 2023-07-26 NOTE — ED ADULT NURSE NOTE - ED STAT RN HANDOFF DETAILS 2
Report given to receiving change of shift JORDAN LUONG patient is in no acute distress. Patient vital signs stable, plan of care explained.

## 2023-07-26 NOTE — H&P ADULT - ASSESSMENT
67 male h/o cad s/p pci, pAfib on eliquis, htn, chol, recent admission for STEMI s/p POBA, now presenting with afib with RVR and NSTEMI    Afib with RVR  on AC with Eliquis  will start metoprolol succinate 50 bid for rate / rhythm control  hold coreg  d/w cards   EP consult for possible ablation  tele monitor  check tsh    NSTEMI  likely demand ischemia in setting of afib with rvr  cards f/u  s/p recent POBA  trend trop    cad s/p pci  cont asa/plavix    htn  cont home losartan/hctz  monitor bp    chol  cont statin      Advanced care planning was discussed with patient and family.  Advanced care planning forms were reviewed and discussed as appropriate.  Differential diagnosis and plan of care discussed with patient after the evaluation.   Pain assessed and judicious use of narcotics when appropriate was discussed.  Importance of Fall prevention discussed.  Counseling on Smoking and Alcohol cessation was offered when appropriate.  Counseling on Diet, exercise, and medication compliance was done.       Approx 60 minutes spent.

## 2023-07-26 NOTE — CONSULT NOTE ADULT - SUBJECTIVE AND OBJECTIVE BOX
CHIEF COMPLAINT: palpitations    HISTORY OF PRESENT ILLNESS:  67M PMH CAD with 10 stents last one placed 2019, HTN, pA-fib on eliquis, HLD Recent admission to see ICU for MI status post RCA POBA 7/22 discharged yesterday presenting to the emergency department with 3 hours of acute onset palpitations associated with lightheadedness and dyspnea, no associated chest pain.  patient denies fever, chills, cough.  No recent abdominal pain, nausea, vomiting.  Patient states he is has history of A-fib and goes in and out but his palpitations usually resolve on their own.  since symptoms persisted he decided to come to the emergency department for further evaluation.  Upon arrival to emergency department, patient's heart rate 140s, EKG showing A-fib with RVR.  Patient's blood pressure 140s to 170s systolic.  Patient given 5 mg IV Lopressor.  EP consulted for further evaluation. Pt currently in NSR, converted this AM ~0200 without conversion pause. He is currently feeling well.   He reports intermittent palpitations for years but does not remember being on a blood thinner except for recent admission in 5/2023 for AF RVR after which he was put on Eliquis. Reports compliance with his blood thinner at home. Denies any prior cardioversions/ablations/AADs.     Allergies    penicillin (Anaphylaxis)  contrast dye- itch/rash (Other)  Shrimp (Anaphylaxis)    Intolerances      MEDICATIONS:  apixaban 5 milliGRAM(s) Oral every 12 hours  aspirin enteric coated 81 milliGRAM(s) Oral daily  clopidogrel Tablet 75 milliGRAM(s) Oral daily  hydrochlorothiazide 25 milliGRAM(s) Oral daily  metoprolol succinate ER 50 milliGRAM(s) Oral two times a day  valsartan 320 milliGRAM(s) Oral daily  atorvastatin 40 milliGRAM(s) Oral at bedtime  finasteride 5 milliGRAM(s) Oral daily    PAST MEDICAL & SURGICAL HISTORY:  Hypercholesteremia  HTN (hypertension)  H/O heart artery stent  2008, 2012  Asthma  MI (myocardial infarction)  2008  Melanoma  Ankle fracture  Atrial fibrillation  On Xarelto  No Past Surgical History  S/P angioplasty with stent  Melanoma  removal  History of coronary artery stent placement  2007, 2012, 2013    SOCIAL HISTORY:    Cigar smoker  Occasional EtOH intake  Denies drug use    REVIEW OF SYSTEMS:  See HPI. Otherwise, 12 point ROS done and otherwise negative.    PHYSICAL EXAM:  T(C): 36.7 (07-26-23 @ 11:48), Max: 36.7 (07-26-23 @ 11:48)  HR: 72 (07-26-23 @ 11:48) (60 - 146)  BP: 132/74 (07-26-23 @ 11:48) (117/77 - 170/146)  RR: 17 (07-26-23 @ 11:48) (17 - 20)  SpO2: 96% (07-26-23 @ 11:48) (95% - 96%)  Wt(kg): --  I&O's Summary      Appearance: Normal	  HEENT: NC/AT  Cardiovascular: Normal S1 S2, No JVD  Respiratory: Lungs clear to auscultation	  Psychiatry: A & O x 3, Mood & affect appropriate  Neurologic: Non-focal  Extremities: No BLE edema        LABS:	 	    CBC Full  -  ( 26 Jul 2023 00:16 )  WBC Count : 9.32 K/uL  Hemoglobin : 16.0 g/dL  Hematocrit : 48.9 %  Platelet Count - Automated : 196 K/uL  Mean Cell Volume : 90.1 fl  Mean Cell Hemoglobin : 29.5 pg  Mean Cell Hemoglobin Concentration : 32.7 gm/dL  Auto Neutrophil # : 6.23 K/uL  Auto Lymphocyte # : 1.98 K/uL  Auto Monocyte # : 0.67 K/uL  Auto Eosinophil # : 0.35 K/uL  Auto Basophil # : 0.06 K/uL  Auto Neutrophil % : 66.9 %  Auto Lymphocyte % : 21.2 %  Auto Monocyte % : 7.2 %  Auto Eosinophil % : 3.8 %  Auto Basophil % : 0.6 %    07-26    141  |  105  |  19  ----------------------------<  111<H>  4.6   |  21<L>  |  1.07  07-25    139  |  104  |  19  ----------------------------<  97  4.3   |  25  |  1.03    Ca    9.6      26 Jul 2023 00:16  Ca    9.3      25 Jul 2023 06:54  Phos  3.1     07-25  Mg     2.1     07-25    TPro  7.1  /  Alb  4.1  /  TBili  0.9  /  DBili  x   /  AST  32  /  ALT  32  /  AlkPhos  107  07-26      proBNP: Pro-Brain Natriuretic Peptide (07.26.23 @ 00:16)    Pro-Brain Natriuretic Peptide: 412 pg/mL    TSH: Thyroid Stimulating Hormone, Serum (07.22.23 @ 13:44)    Thyroid Stimulating Hormone, Serum: 0.47 uIU/mL      CARDIAC MARKERS:Troponin T, High Sensitivity (07.26.23 @ 02:27)    Troponin T, High Sensitivity Result: 1126: *  *  Rapid upward or downward changes in high-sensitivity troponin levels  suggest acute myocardial injury. Renal impairment may cause sustained  troponin elevations.  Normal: <6 - 14 ng/L  Indeterminate: 15-51 ng/L  Elevated: > 51 ng/L  See http://labs/test/TROPTHS on the [facility] intranet for more  information ng/L    Creatine Kinase, Serum: 135 U/L (07-26-23 @ 02:27)    	  RADIOLOGY: 	  < from: Xray Chest 1 View- PORTABLE-Urgent (07.26.23 @ 01:06) >  LINES/TUBES/SUPPORT DEVICES: None    LUNGS/PLEURA: Small lung volumes without focal consolidation. No pleural   effusion or pneumothorax.    HEART AND MEDIASTINUM: Heart sizeis within normal limits.    BONES: No acute bony abnormality.    IMPRESSION:  Clear lungs.    < end of copied text >    PREVIOUS DIAGNOSTIC TESTING:    [ ] Echocardiogram: < from: TTE W or WO Ultrasound Enhancing Agent (07.22.23 @ 08:47) >  _______________________________________________________________________________________  CONCLUSIONS:      1. Small left ventricular cavity size. The left ventricular wall thickness is mildly increased. The left ventricular systolic function is mildly decreased with an ejection fraction of 47 % by Molina's method of disks. There are regional wall motion abnormalities.   2. Basal and mid inferior wall and basal and mid inferolateral wall are abnormal.   3. There is mild (grade 1) left ventricular diastolic dysfunction, with normal filling pressure. Analysis of left ventricular diastolic function and filling pressure is made challenging by the presence of merged E and A wave.   4. Normal right ventricular cavity size, normal right ventricular wall thickness and reduced systolic right ventricular function.   5. There is hypokinesis of the right ventricular free wall and apex.   6. There is trace tricuspid regurgitation. Estimated pulmonary artery systolic pressure is 32 mmHg.   7. No evidence of aortic regurgitation.   8. No pericardial effusion seen.   9. Compared to the transthoracic echocardiogram performed on 7/12/2023 There is no change in LVEF however, there is more evident inferior and inferolateral wall motion abnormalities.    ________________________________________________________________________________________    < end of copied text >    [ ]  Catheterization: < from: Cardiac Catheterization (07.22.23 @ 05:59) >  Conclusions:     STEMI of the right coronary artery   --There are multiple layers of stent in region of the acutely occluded  right coronary artery  --Left to right, Rentrope grade 3 collateral filling   Mild to moderate in-stent restenosis of the left main, left anterior  descending and first diagonal artery stents  Right dominant system   LVEDP = 18mmHg   No gradient across the aortic valve     Status post angioplasty of the severe in-stent restenosis of the right  coronary artery with resultant ZOEY 3 flow    Recommendations:     Keep right arm straight for 4 hours following removal of sheath  (radial band applied, Angiomax administered)  Continue aspirin 81mg daily    Patient should be transitioned to clopidogrel from ticagrelor   Resume anticoagulation therapy in 24 hours based upon clinical status,  CBC and radial access stability  Closely monitor for signs and symptoms of bleeding   Further conversations should be had with primary inteventional  cardiologist/Dr. Esteves concerning further treatment  recommendations for right coronary artery management   Recommend that a TTE be performed   Patient to be transferred to CICU for further observation and  management    < end of copied text >  < from: Cardiac Catheterization (07.22.23 @ 05:59) >    Coronary Angiography   LM   Left main artery: There is a 30 % in-stent restenosis.      LAD   Proximal left anterior descending: There is a 40 % in-stent  restenosis. Firstdiagonal: There is a 60 % in-stent restenosis in the  ostium portion of the segment.      CX   Proximal circumflex: There is an 80 % stenosis in the proximal third  portion of the segment.    RCA   Mid right coronary artery: There is a 100 % in-stentrestenosis. ZOEY  Flow 0.    Left Heart Cath   Ejection fraction is 45%.        < end of copied text >    
Patient seen and evaluated @   Chief Complaint:     HPI:   67M PMH CAD with 10 stents last one placed in 2019, HTN, A-fib on eliquis, HLD Recent admission to see ICU for MI status post RCA POBA discharged yesterday presenting to the emergency department with 3 hours of acute onset palpitations associated with lightheadedness and dyspnea, no associated chest pain.  patient denies fever, chills, cough.  No recent abdominal pain, nausea, vomiting.  Patient states he is has history of A-fib and goes in and out but his palpitations usually resolve on their own.  since symptoms persisted he decided to come to the emergency department for further evaluation.  Upon arrival to emergency department, patient's heart rate 140s, EKG showing A-fib with RVR.  Patient's blood pressure 140s to 170s systolic.  Patient given 5 mg IV Lopressor. (26 Jul 2023 10:14)    PMH:   Hypercholesteremia    HTN (hypertension)    H/O heart artery stent    Asthma    MI (myocardial infarction)    Melanoma    Ankle fracture    Atrial fibrillation      PSH:   No Past Surgical History    S/P angioplasty with stent    Melanoma    History of coronary artery stent placement      Medications:   aMIOdarone    Tablet 400 milliGRAM(s) Oral two times a day  apixaban 5 milliGRAM(s) Oral every 12 hours  aspirin enteric coated 81 milliGRAM(s) Oral daily  atorvastatin 40 milliGRAM(s) Oral at bedtime  clopidogrel Tablet 75 milliGRAM(s) Oral daily  finasteride 5 milliGRAM(s) Oral daily  hydrochlorothiazide 25 milliGRAM(s) Oral daily  metoprolol succinate ER 50 milliGRAM(s) Oral two times a day  valsartan 320 milliGRAM(s) Oral daily    Allergies:  penicillin (Anaphylaxis)  contrast dye- itch/rash (Other)  Shrimp (Anaphylaxis)    FAMILY HISTORY:    Social History:  Smoking:  Alcohol:  Drugs:    Review of Systems:  [ ]Unable to obtain  Constitutional: No fever, chills, fatigue, or changes in weight  HEENT: No blurry vision, eye pain, headache, runny nose, or sore throat  Respiratory: No shortness of breath, cough, or wheezing  Cardiovascular: No chest pain or palpitations  Gastrointestinal: No nausea, vomiting, diarrhea, or abdominal pain  Genitourinary: No dysuria or incontinence  Extremities: No lower extremity swelling  Neurologic: No focal findings  Lymphatic: No lymphadenopathy   Skin: No rash  Psychiatry: No anxiety or depression  10 point review of systems is otherwise negative except as mentioned above            Physical Exam:  T(C): 36.9 (07-26-23 @ 17:57), Max: 37 (07-26-23 @ 14:47)  HR: 60 (07-26-23 @ 17:57) (60 - 146)  BP: 141/80 (07-26-23 @ 17:57) (117/77 - 170/146)  RR: 18 (07-26-23 @ 17:57) (17 - 20)  SpO2: 97% (07-26-23 @ 17:57) (95% - 98%)  Wt(kg): --    Daily Height in cm: 177.8 (25 Jul 2023 23:35)    Daily     Appearance: Normal, well groomed, NAD  Eyes: PERRLA, EOMI, pink conjunctiva, no scleral icterus   HENT: Normal oral mucosa  Cardiovascular: RRR, S1, S2, no murmur, rub, or gallop; no edema; no JVD  Procedural Access Site: Clean, dry, intact, without hematoma  Respiratory: Clear to auscultation bilaterally  Gastrointestinal: Soft, non-tender, non-distended, BS+  Musculoskeletal: No clubbing or joint deformity   Neurologic: No focal weakness  Lymphatic: No lymphadenopathy  Psychiatry: AAOx3 with appropriate mood and affect  Skin: No rashes, ecchymoses, or cyanosis    Cardiovascular Diagnostic Testing:  ECG: AFib with RVR    Echo: < from: TTE W or WO Ultrasound Enhancing Agent (07.22.23 @ 08:47) >  _______________________________________________________________________________________  CONCLUSIONS:      1. Small left ventricular cavity size. The left ventricular wall thickness is mildly increased. The left ventricular systolic function is mildly decreased with an ejection fraction of 47 % by Molina's method of disks. There are regional wall motion abnormalities.   2. Basal and mid inferior wall and basal and mid inferolateral wall are abnormal.   3. There is mild (grade 1) left ventricular diastolic dysfunction, with normal filling pressure. Analysis of left ventricular diastolic function and filling pressure is made challenging by the presence of merged E and A wave.   4. Normal right ventricular cavity size, normal right ventricular wall thickness and reduced systolic right ventricular function.   5. There is hypokinesis of the right ventricular free wall and apex.   6. There is trace tricuspid regurgitation. Estimated pulmonary artery systolic pressure is 32 mmHg.   7. No evidence of aortic regurgitation.   8. No pericardial effusion seen.   9. Compared to the transthoracic echocardiogram performed on 7/12/2023 There is no change in LVEF however, there is more evident inferior and inferolateral wall motion abnormalities.    ________________________________________________________________________________________    < end of copied text >      Stress Testing:    Cath:    < from: Cardiac Catheterization (07.22.23 @ 05:59) >  Conclusions:     STEMI of the right coronary artery   --There are multiple layers of stent in region of the acutely occluded  right coronary artery  --Left to right, Rentrope grade 3 collateral filling   Mild to moderate in-stent restenosis of the left main, left anterior  descending and first diagonal artery stents  Right dominant system   LVEDP = 18mmHg   No gradient across the aortic valve     Status post angioplasty of the severe in-stent restenosis of the right  coronary artery with resultant ZOEY 3 flow    < end of copied text >    Interpretation of Telemetry: AF with RVR, converted to NSR at 2 am without pause    Imaging:    Labs:                        16.0   9.32  )-----------( 196      ( 26 Jul 2023 00:16 )             48.9     07-26    141  |  105  |  19  ----------------------------<  111<H>  4.6   |  21<L>  |  1.07    Ca    9.6      26 Jul 2023 00:16  Phos  3.1     07-25  Mg     2.1     07-25    TPro  7.1  /  Alb  4.1  /  TBili  0.9  /  DBili  x   /  AST  32  /  ALT  32  /  AlkPhos  107  07-26    PT/INR - ( 26 Jul 2023 00:16 )   PT: 11.8 sec;   INR: 1.02 ratio         PTT - ( 26 Jul 2023 00:16 )  PTT:33.2 sec  CARDIAC MARKERS ( 26 Jul 2023 02:27 )  x     / x     / 135 U/L / x     / 4.8 ng/mL            Thyroid Stimulating Hormone, Serum: 0.47 uIU/mL (07-22 @ 13:44)  Thyroid Stimulating Hormone, Serum: 0.95 uIU/mL (07-22 @ 09:20)

## 2023-07-26 NOTE — ED ADULT NURSE NOTE - OBJECTIVE STATEMENT
Pt is a 66yo m coming from home c/o rapid heart rate. [facility] and began to feel a rapid heart rate at home, took heart rate and was in the 150's. Pt states that he has a history A-fib and had an episode approx 2 weeks ago, but resolved itself and took metoprolol outpatient. PT states that he is feeling dizzy and SOB so took an extra 50mg of metoprolol at home to see if it would resolve itself. PMH of HLD, HTN, MI, A-fib. PT A,Ox4, ambulatory at baseline, pt placed on cardiac monitor, in A-fib, EKG performed in ED. Respirations even and unlabored, abd soft, nondistended and nontender, skin warm, dry and intact, PEREZ. Denies n/v/d, fever, chills and urinary symptoms.

## 2023-07-26 NOTE — CHART NOTE - NSCHARTNOTEFT_GEN_A_CORE
Patient re-admitted after discharge earlier on 7/25 from admission for STEMI s/p POBA to RCA (7/22). Patient with extensive history of CAD, 10 prior stents, also with hx A-fib on Eliquis, HTN, HLD. Noted to have EF 47% on TTE from earlier in day.    Patient discharged home and noted palpitations after lying in bed around 9pm. Denies chest pain, notes some mild shortness of breath. Returned to ED, noted to be in a-fib with RVR to 150s and to have elevated troponin to 1255.  Called to evaluate patient for possible ACS. Patient continues to deny any chest pain, and endorses that his only symptoms are palpitations and SOB.    , /97, SpO2 96% on RA, afebrile.    On exam,  General: Comfortable appearing, no acute distress  Cardiac: Rapid, irregular, no murmurs appreciated. No LE edema  Pulmonary: Lungs clear to auscultation bilaterally    Labs:  hsTroponin T - 1255->1126 (previously 384 on 7/22)  Pro-    EKG with a-fib to 150s with no ST elevations, Q waves noted in inferior leads (seen previously).    Assessment:  67M with extensive history of CAD, 10 prior stents, also with hx A-fib on Eliquis, HTN, HLD with recent admission for STEMI s/p POBA to RCA now presenting with a-fib with RVR and elevated troponin without chest pain. Clinical picture does not support recurrent ACS, most likely demand ischemia i/s/o coronary disease and a-fib with RVR.    - Rate control for a-fib, can use PO metoprolol, IV if necessary  - [id number] score at least 4  - Patient already on Eliquis, ASA, clopidogrel, reports good compliance with all.  - Please contact patient's primary cardiologist, Dr. Anish Colón or his group for full consult  - Discussed with Interventional Cardiology attending, Dr. Berry Lyles.    Nic Maynard MD  Cardiology Fellow Patient re-admitted after discharge earlier on 7/25 from admission for STEMI s/p POBA to RCA (7/22). Patient with extensive history of CAD, 10 prior stents, also with hx A-fib on Eliquis, HTN, HLD. Noted to have EF 47% on TTE from earlier in day.    Patient discharged home and noted palpitations after lying in bed around 9pm. Denies chest pain, notes some mild shortness of breath. Returned to ED, noted to be in a-fib with RVR to 150s and to have elevated troponin to 1255.  Called to evaluate patient for possible ACS. Patient continues to deny any chest pain, and endorses that his only symptoms are palpitations and SOB.    , /97, SpO2 96% on RA, afebrile.    On exam,  General: Comfortable appearing, no acute distress  Cardiac: Rapid, irregular, no murmurs appreciated. No LE edema  Pulmonary: Lungs clear to auscultation bilaterally    Labs:  hsTroponin T - 1255->1126 (previously 384 on 7/22)  Pro-    EKG with a-fib to 150s with no ST elevations, Q waves noted in inferior leads (seen previously).    Assessment:  67M with extensive history of CAD, 10 prior stents, also with hx A-fib on Eliquis, HTN, HLD with recent admission for STEMI s/p POBA to RCA now presenting with a-fib with RVR and elevated troponin without chest pain. Clinical picture does not support recurrent ACS, most likely demand ischemia i/s/o coronary disease and a-fib with RVR.    - Rate control for a-fib, can use PO metoprolol, IV if necessary  - [id number] score at least 4  - Patient already on Eliquis, ASA, clopidogrel, reports good compliance with all.  - Please contact patient's primary cardiologist, Dr. Anish Colón or his group for full consult  - Discussed with Interventional Cardiology attending, Dr. Berry Lyles.    Nic Maynard MD  Cardiology Fellow      ***Note not finalized until co-signed by attending***

## 2023-07-26 NOTE — ED ADULT NURSE REASSESSMENT NOTE - NS ED NURSE REASSESS COMMENT FT1
Pt received from [facility] in [place]. pt aox4 breathing even unlabored spontaneously, lunch tray provided. NSR 60s.
0700 Report received from PEREZ Maynard Pt AAOx4, NAD, resp nonlabored, skin warm/dry, resting comfortably in bed . Pt denies headache, dizziness, chest pain, palpitations, SOB, abd pain, n/v/d, urinary symptoms, fevers, chills, weakness at this time. Pt awaiting for bed  . Safety maintained with call bell within reach.

## 2023-07-26 NOTE — H&P ADULT - HISTORY OF PRESENT ILLNESS
67M PMH CAD with 10 stents last one placed in 2019, HTN, A-fib on eliquis, HLD Recent admission to see ICU for MI status post RCA POBA discharged yesterday presenting to the emergency department with 3 hours of acute onset palpitations associated with lightheadedness and dyspnea, no associated chest pain.  patient denies fever, chills, cough.  No recent abdominal pain, nausea, vomiting.  Patient states he is has history of A-fib and goes in and out but his palpitations usually resolve on their own.  since symptoms persisted he decided to come to the emergency department for further evaluation.  Upon arrival to emergency department, patient's heart rate 140s, EKG showing A-fib with RVR.  Patient's blood pressure 140s to 170s systolic.  Patient given 5 mg IV Lopressor.

## 2023-07-26 NOTE — CONSULT NOTE ADULT - ASSESSMENT
67 year-old gentleman with recent PCI, now admitted with symptomatic AFib with RVR.  Agree with EP recommendations:  Continue triple therapy with DAPT (after recent PCI) and Eliquis for thromboembolic prophylaxis in patient with PAF and CHADSVASc = 3 (HTN, age, and vascular disease).  Continue beta-blocker for rate control and coronary artery disease.    Start amiodarone load with 400 mg BID. Continue for one week, then 200 mg daily for maintenance.  Monitor overnight to confirm he is tolerating amiodarone load.  Follow-up with EP as outpatient for possible ablation.
67 male h/o cad s/p pci, pAfib on eliquis, htn, chol, recent admission for STEMI s/p POBA 7/22, now presenting with afib with RVR.     #AF RVR  #CAD s/p PCI/POBA 7/22  #HTN    - Pt initially in rapid AF, converted to NSR ~0200 without conversion pause. Rates in sinus ~70s.   - Pt recently admitted for STEMI s/p POBA to  RCA. Cardiology has seen as patient's troponins elevated in ~1000s on arrival, likely tachy related per cards.   - Discussed options of management with patient including AAD therapy and ablation. As patient is s/p recent coronary intervention, would wait on pursuing ablation until he has recovered. Discussed options of antiarrhythmic therapies including Amiodarone, Sotalol, Tikosyn. Pt does not want to stay for QTc monitoring and opts for short term Amiodarone therapy with ablation in a couple months.   -- Discussed risks of long term Amio therapy with patient including opthalmology/lung/liver/thyroid/skin toxicities.   -- Please start Amiodarone 400mg BID x 1 week, then 400mg once daily x 1 week, then 200mg once daily thereafter. Pt would like to stay overnight for observation on new medication.   -- LFTs/TFTs WNL.   - Please continue AC with Eliquis 5mg BID. Currently on triple therapy for 1 week, then discontinue ASA per cards.  - Keep on tele. Keep K>4, Mg>2  - F/U with Dr. Olson arranged for 9/29/23 at 930AM to discuss ablation.   - Discussed with EP attending and primary team.

## 2023-07-26 NOTE — ED ADULT NURSE NOTE - SKIN CAPILLARY REFILL
Population Health Outreach.  After reviewing chart and [facility] , no recent pap smear found from [facility]. Notified pt of this and she said she would get that scheduled  
2 seconds or less

## 2023-07-26 NOTE — PATIENT PROFILE ADULT - FALL HARM RISK - HARM RISK INTERVENTIONS

## 2023-07-26 NOTE — ED PROVIDER NOTE - ATTENDING CONTRIBUTION TO CARE
I, Dr. Vidhya Chaparro, have personally performed a face to face medical and diagnostic evaluation of the patient. I have discussed with and reviewed the Resident's and/or ACP's and/or Medical/PA/NP student's note and agree with the History, ROS, Physical Exam and MDM unless otherwise indicated. A brief summary of my personal evaluation and impression can be found below.    Agree with above.    MDM: Patient with recent STEMI with RCA intervention presenting with palpitations and shortness of breath found to be in A-fib RVR to 150s, blood pressure stable.  EKG without signs of acute ischemia.  No fevers, chills, nausea, vomiting, changes in bowel or bladder habits.  Patient well-appearing, clear lungs, abdomen soft/nontender, moist mucous membranes, no leg swelling.  Concern for rapid A-fib and patient with recent coronary intervention.  Possible new ischemia but lower suspicion.  Doubt PE as he is on AC.  Will assess for electrolyte abnormality, chest x-ray, cardiac monitoring, IV rate control, and admit.

## 2023-07-26 NOTE — H&P ADULT - NSICDXPASTMEDICALHX_GEN_ALL_CORE_FT
PAST MEDICAL HISTORY:  Ankle fracture     Asthma     Atrial fibrillation On Xarelto    H/O heart artery stent 2008, 2012    HTN (hypertension)     Hypercholesteremia     Melanoma     MI (myocardial infarction) 2008     Quinolones Counseling:  I discussed with the patient the risks of fluoroquinolones including but not limited to GI upset, allergic reaction, drug rash, diarrhea, dizziness, photosensitivity, yeast infections, liver function test abnormalities, tendonitis/tendon rupture.

## 2023-07-26 NOTE — ED ADULT NURSE NOTE - NSFALLHARMRISKINTERV_ED_ALL_ED

## 2023-07-27 ENCOUNTER — TRANSCRIPTION ENCOUNTER (OUTPATIENT)
Age: 68
End: 2023-07-27

## 2023-07-27 VITALS
DIASTOLIC BLOOD PRESSURE: 74 MMHG | OXYGEN SATURATION: 94 % | RESPIRATION RATE: 18 BRPM | HEART RATE: 65 BPM | TEMPERATURE: 98 F | SYSTOLIC BLOOD PRESSURE: 124 MMHG

## 2023-07-27 LAB
ALBUMIN SERPL ELPH-MCNC: 3.8 G/DL — SIGNIFICANT CHANGE UP (ref 3.3–5)
ALP SERPL-CCNC: 92 U/L — SIGNIFICANT CHANGE UP (ref 40–120)
ALT FLD-CCNC: 49 U/L — HIGH (ref 10–45)
ANION GAP SERPL CALC-SCNC: 12 MMOL/L — SIGNIFICANT CHANGE UP (ref 5–17)
AST SERPL-CCNC: 35 U/L — SIGNIFICANT CHANGE UP (ref 10–40)
BILIRUB SERPL-MCNC: 1 MG/DL — SIGNIFICANT CHANGE UP (ref 0.2–1.2)
BUN SERPL-MCNC: 18 MG/DL — SIGNIFICANT CHANGE UP (ref 7–23)
CALCIUM SERPL-MCNC: 9.2 MG/DL — SIGNIFICANT CHANGE UP (ref 8.4–10.5)
CHLORIDE SERPL-SCNC: 107 MMOL/L — SIGNIFICANT CHANGE UP (ref 96–108)
CO2 SERPL-SCNC: 23 MMOL/L — SIGNIFICANT CHANGE UP (ref 22–31)
CREAT SERPL-MCNC: 1.2 MG/DL — SIGNIFICANT CHANGE UP (ref 0.5–1.3)
EGFR: 67 ML/MIN/1.73M2 — SIGNIFICANT CHANGE UP
GLUCOSE SERPL-MCNC: 91 MG/DL — SIGNIFICANT CHANGE UP (ref 70–99)
HCT VFR BLD CALC: 47.6 % — SIGNIFICANT CHANGE UP (ref 39–50)
HGB BLD-MCNC: 15.4 G/DL — SIGNIFICANT CHANGE UP (ref 13–17)
MCHC RBC-ENTMCNC: 30 PG — SIGNIFICANT CHANGE UP (ref 27–34)
MCHC RBC-ENTMCNC: 32.4 GM/DL — SIGNIFICANT CHANGE UP (ref 32–36)
MCV RBC AUTO: 92.8 FL — SIGNIFICANT CHANGE UP (ref 80–100)
NRBC # BLD: 0 /100 WBCS — SIGNIFICANT CHANGE UP (ref 0–0)
PLATELET # BLD AUTO: 175 K/UL — SIGNIFICANT CHANGE UP (ref 150–400)
POTASSIUM SERPL-MCNC: 4.7 MMOL/L — SIGNIFICANT CHANGE UP (ref 3.5–5.3)
POTASSIUM SERPL-SCNC: 4.7 MMOL/L — SIGNIFICANT CHANGE UP (ref 3.5–5.3)
PROT SERPL-MCNC: 6.5 G/DL — SIGNIFICANT CHANGE UP (ref 6–8.3)
RBC # BLD: 5.13 M/UL — SIGNIFICANT CHANGE UP (ref 4.2–5.8)
RBC # FLD: 13.1 % — SIGNIFICANT CHANGE UP (ref 10.3–14.5)
SODIUM SERPL-SCNC: 142 MMOL/L — SIGNIFICANT CHANGE UP (ref 135–145)
TSH SERPL-MCNC: 1.01 UIU/ML — SIGNIFICANT CHANGE UP (ref 0.27–4.2)
WBC # BLD: 7.58 K/UL — SIGNIFICANT CHANGE UP (ref 3.8–10.5)
WBC # FLD AUTO: 7.58 K/UL — SIGNIFICANT CHANGE UP (ref 3.8–10.5)

## 2023-07-27 PROCEDURE — 83605 ASSAY OF LACTIC ACID: CPT

## 2023-07-27 PROCEDURE — 84484 ASSAY OF TROPONIN QUANT: CPT

## 2023-07-27 PROCEDURE — 84443 ASSAY THYROID STIM HORMONE: CPT

## 2023-07-27 PROCEDURE — 82435 ASSAY OF BLOOD CHLORIDE: CPT

## 2023-07-27 PROCEDURE — 80053 COMPREHEN METABOLIC PANEL: CPT

## 2023-07-27 PROCEDURE — 85730 THROMBOPLASTIN TIME PARTIAL: CPT

## 2023-07-27 PROCEDURE — 82803 BLOOD GASES ANY COMBINATION: CPT

## 2023-07-27 PROCEDURE — 82330 ASSAY OF CALCIUM: CPT

## 2023-07-27 PROCEDURE — 85018 HEMOGLOBIN: CPT

## 2023-07-27 PROCEDURE — 82550 ASSAY OF CK (CPK): CPT

## 2023-07-27 PROCEDURE — 99285 EMERGENCY DEPT VISIT HI MDM: CPT | Mod: 25

## 2023-07-27 PROCEDURE — 99232 SBSQ HOSP IP/OBS MODERATE 35: CPT

## 2023-07-27 PROCEDURE — 82947 ASSAY GLUCOSE BLOOD QUANT: CPT

## 2023-07-27 PROCEDURE — 71045 X-RAY EXAM CHEST 1 VIEW: CPT

## 2023-07-27 PROCEDURE — 85025 COMPLETE CBC W/AUTO DIFF WBC: CPT

## 2023-07-27 PROCEDURE — 83880 ASSAY OF NATRIURETIC PEPTIDE: CPT

## 2023-07-27 PROCEDURE — 82553 CREATINE MB FRACTION: CPT

## 2023-07-27 PROCEDURE — 85014 HEMATOCRIT: CPT

## 2023-07-27 PROCEDURE — 84295 ASSAY OF SERUM SODIUM: CPT

## 2023-07-27 PROCEDURE — 85027 COMPLETE CBC AUTOMATED: CPT

## 2023-07-27 PROCEDURE — 85610 PROTHROMBIN TIME: CPT

## 2023-07-27 PROCEDURE — 99233 SBSQ HOSP IP/OBS HIGH 50: CPT

## 2023-07-27 PROCEDURE — 96374 THER/PROPH/DIAG INJ IV PUSH: CPT

## 2023-07-27 PROCEDURE — 84132 ASSAY OF SERUM POTASSIUM: CPT

## 2023-07-27 RX ORDER — METOPROLOL TARTRATE 50 MG
1 TABLET ORAL
Qty: 30 | Refills: 0
Start: 2023-07-27 | End: 2023-08-25

## 2023-07-27 RX ORDER — AMIODARONE HYDROCHLORIDE 400 MG/1
2 TABLET ORAL
Qty: 120 | Refills: 0
Start: 2023-07-27 | End: 2023-08-25

## 2023-07-27 RX ADMIN — VALSARTAN 320 MILLIGRAM(S): 80 TABLET ORAL at 06:11

## 2023-07-27 RX ADMIN — Medication 50 MILLIGRAM(S): at 06:11

## 2023-07-27 RX ADMIN — APIXABAN 5 MILLIGRAM(S): 2.5 TABLET, FILM COATED ORAL at 06:11

## 2023-07-27 RX ADMIN — AMIODARONE HYDROCHLORIDE 400 MILLIGRAM(S): 400 TABLET ORAL at 06:11

## 2023-07-27 NOTE — DISCHARGE NOTE PROVIDER - NSDCCPCAREPLAN_GEN_ALL_CORE_FT
PRINCIPAL DISCHARGE DIAGNOSIS  Diagnosis: Atrial fibrillation with RVR  Assessment and Plan of Treatment: Atrial fibrillation is a common heart rhythm problem which increases the risk of stroke and heat attack.  It helps if you control your blood pressure, avoid alcohol, cut down on caffeine, get treatment for your thyroid if it is overactive, and perform moderate exercise in consultation with your Primary Care Provider.  Call your doctor if you experience chest tightness/pain, lightheadedness, loss of consciousness, shortness of breath (especially with exercise), feel your heart racing or beating unusually, frequent or abnormal bleeding.  It is important to take all your heart medications as prescribed.   Follow up with Dr. Olson 9/29 at 9:30 for outpatient follow up and discussion of possible ablation   Follow up with your PCP within one week for further outpatient management      SECONDARY DISCHARGE DIAGNOSES  Diagnosis: CAD (coronary artery disease)  Assessment and Plan of Treatment: Coronary artery disease is a condition where the arteries the supply the heart muscle get clogged with fatty deposits & puts you at risk for a heart attack.  Call your doctor if you have any new pain, pressure, or discomfort in the center of your chest, pain, tingling or discomfort in arms, back, neck, jaw, or stomach, shortness of breath, nausea, vomiting, burping or heartburn, sweating, cold and clammy skin, racing or abnormal heartbeat for more than 10 minutes or if they keep coming & going.  Call 911 and do not try to get to hospital by car.  You can help yourself with lifestyle changes (quitting smoking if you If you are on medication to help you quit smoking, be sure to take it as prescribed. Find healthy ways to deal with stress, such as exercise (check with your healthcare provider first), deep breathing, meditation, or enjoyable healthy hobbies.  Avoid situations that may cause you to smoke a cigarette.  Look for help with quitting; you are not alone. A resource to help you stop smoking is the [facility] – phone number 058-222-1018.), eat lots of fruits & vegetables & low fat dairy products, not a lot of meat & fatty foods, walk or some form of physical activity most days of the week, lose weight if you are overweight.  Take your cardiac medication as prescribed to lower cholesterol, to lower blood pressure, and control your blood sugar.   Continue ASA for one week then stop   Continue Plavix as prescribed    Diagnosis: Hypertension  Assessment and Plan of Treatment: Continue to follow a low salt/sodium diet.  Perform physical activities as tolerated in consultation with your Primary Care Provider and physical therapist.  Take all medications as prescribed.  Follow up with your medical doctor for routine blood pressure monitoring at your next visit.  Notify your doctor if you have any of the following symptoms:  Dizziness, lightheadedness, blurry vision, headache, chest pain, or shortness of breath.    Diagnosis: Hyperlipidemia  Assessment and Plan of Treatment: Low salt, low fat, low cholesterol, diabetic diet if appropriate  Continue medication as prescribed  Exercise, increase your activity level  Pt. verbalized an understanding of all instructions.

## 2023-07-27 NOTE — DISCHARGE NOTE PROVIDER - HOSPITAL COURSE
67 male h/o cad s/p pci, pAfib on eliquis, htn, chol, recent admission for STEMI s/p POBA, now presenting with afib with RVR. Patient received IV metoprolol in the ED and is currently back in normal sinus rhythm. EP and cardio consulted; continue eliquis, start metoprolol and start amiodarone taper. Outpatient EP follow up for possible ablation. Hospital course complicated by elevated troponins; likely demand ischemia, contine ASA/Plavix.  Discharge/dispo/med rec discussed with attending Dr. Bhatt. Patient is medically cleared for discharge with outpatient follow up.     67 male h/o cad s/p pci, pAfib on eliquis, htn, chol, recent admission for STEMI s/p POBA, now presenting with afib with RVR. Patient received IV metoprolol in the ED and is currently back in normal sinus rhythm. EP and cardio consulted; continue eliquis, start metoprolol ER daily/metoprolol tartrate as needed for afib and start amiodarone taper. Outpatient EP follow up for possible ablation. Hospital course complicated by elevated troponins; likely demand ischemia, contine ASA for one week and Plavix.  Discharge/dispo/med rec discussed with attending Dr. Bhatt. Patient is medically cleared for discharge with outpatient follow up.

## 2023-07-27 NOTE — DISCHARGE NOTE PROVIDER - NSDCMRMEDTOKEN_GEN_ALL_CORE_FT
aspirin 81 mg oral tablet: 1 tab(s) orally once a day  carvedilol 3.125 mg oral tablet: 1 tab(s) orally every 12 hours  citracal: 1 tab once a day  clopidogrel 75 mg oral tablet: 1 tab(s) orally once a day  colace: 1 cap once a day  Crestor 40 mg oral tablet: 1 tab(s) orally every other day  Eliquis 5 mg oral tablet: 1 tab(s) orally 2 times a day  finasteride 5 mg oral tablet: 1 tab(s) orally once a day  ubiquinone 200 mg oral capsule: 200 milligram(s) orally once a day  valsartan-hydrochlorothiazide 320 mg-25 mg oral tablet: 1 tab(s) orally once a day  Vitamin D3 2000 intl units oral capsule: 1 cap(s) orally once a day   amiodarone 200 mg oral tablet: 2 tab(s) orally 2 times a day Take 2 tablets (400 mg) twice a day until 8/2/23  Take 2 tablets (400 mg) once a day from  8/3/23-8/10/23  Take 1 tablet (200 mg) once a day after 8/11/23  aspirin 81 mg oral tablet: 1 tab(s) orally once a day  citracal: 1 tab once a day  clopidogrel 75 mg oral tablet: 1 tab(s) orally once a day  colace: 1 cap once a day  Crestor 40 mg oral tablet: 1 tab(s) orally every other day  Eliquis 5 mg oral tablet: 1 tab(s) orally 2 times a day  finasteride 5 mg oral tablet: 1 tab(s) orally once a day  metoprolol succinate 50 mg oral tablet, extended release: 1 tab(s) orally once a day  metoprolol tartrate 25 mg oral tablet: 1 tab(s) orally once a day as needed for  afib  ubiquinone 200 mg oral capsule: 200 milligram(s) orally once a day  valsartan-hydrochlorothiazide 320 mg-25 mg oral tablet: 1 tab(s) orally once a day  Vitamin D3 2000 intl units oral capsule: 1 cap(s) orally once a day

## 2023-07-27 NOTE — PROGRESS NOTE ADULT - NS ATTEND AMEND GEN_ALL_CORE FT
Patient seen at bedside this morning, maintaining sinus, continue with Amiodarone, lower Toprol XL dose from 50mg p.o. bid to 50mg p.o. daily, prescribe Lopressor 25mg p.o. PRN AF recurrence. Outpatient follow-up appointment scheduled for the patient. Will plan for short course of Amiodarone with ablation to follow once the patient is further removed from his recent coronary intervention.     SLAVA Olson

## 2023-07-27 NOTE — DISCHARGE NOTE PROVIDER - CARE PROVIDER_API CALL
Anish Colón  Cardiovascular Disease  [address]  [place] [address]  Phone: [phone]  Fax: [phone]  Follow Up Time:     Cuco Olson  Cardiovascular Disease  [address] [place] [address]  Phone: [phone]  Fax: [phone]  Follow Up Time:

## 2023-07-27 NOTE — PROGRESS NOTE ADULT - TIME BILLING
Paroxysmal atrial fibrillation  Chest pain  Ischemic heart disease  Triple therapy  Amiodarone initiation

## 2023-07-27 NOTE — DISCHARGE NOTE NURSING/CASE MANAGEMENT/SOCIAL WORK - NSDCPEFALRISK_GEN_ALL_CORE
For information on Fall & Injury Prevention, visit: [email or web address] OR  [email or web address] OR  https://www.cdc.gov/steadi/patient.html

## 2023-07-27 NOTE — PROGRESS NOTE ADULT - ASSESSMENT
67 year-old gentleman with recent PCI, now admitted with symptomatic AFib with RVR.  Agree with EP recommendations:  Continue triple therapy with DAPT (after recent PCI) and Eliquis for thromboembolic prophylaxis in patient with PAF and CHADSVASc = 3 (HTN, age, and vascular disease).  Continue beta-blocker for rate control and coronary artery disease.    Start amiodarone load with 400 mg BID. Continue for one week, then 200 mg daily for maintenance.  Monitored overnight to confirm he is tolerating amiodarone load. No issues after three doses.  Follow-up with EP as outpatient for possible ablation.

## 2023-07-27 NOTE — PROGRESS NOTE ADULT - ASSESSMENT
67 male h/o cad s/p pci, pAfib on eliquis, htn, chol, recent admission for STEMI s/p POBA 7/22, now presenting with afib with RVR.     #AF RVR  #CAD s/p PCI/POBA 7/22  #HTN    - Pt initially in rapid AF, converted to NSR ~0200 yesterday without conversion pause. Rates in sinus ~55-70s  - Pt recently admitted for STEMI s/p POBA to  RCA. Cardiology has seen as patient's troponins elevated in ~1000s on arrival, likely tachy related per cards.   - Discussed options of management with patient including AAD therapy and ablation. As patient is s/p recent coronary intervention, would wait on pursuing ablation until he has recovered. Discussed options of antiarrhythmic therapies including Amiodarone, Sotalol, Tikosyn. Pt does not want to stay in hospital for QTc monitoring and opts for short term Amiodarone therapy with ablation in a couple months.   -- Discussed risks of long term Amio therapy with patient including opthalmology/lung/liver/thyroid/skin toxicities.   -- Continue Amiodarone 400mg BID x 1 week, then 400mg once daily x 1 week, then 200mg once daily thereafter. Thus far s/p 800mg.   -- LFTs/TFTs WNL.   - Please continue AC with Eliquis 5mg BID. Currently on triple therapy for 1 week, then discontinue ASA per cards.  - Continue BB with Metoprolol 50mg XL, please reduce to once daily as baseline HRs deo. Please discharge patient with additional Metoprolol tartrate 25mg to take PRN for recurrent palpitations at home.   -- Discussed with patient to take it if he has recurrent symptoms and after he checks his HR to confirm it is tachycardic.   - Keep on tele. Keep K>4, Mg>2  - F/U with Dr. Olson arranged for 9/29/23 at 930AM to discuss ablation.   - Discussed with EP attending and primary team.   - EP will sign off, please reconsult with any questions.

## 2023-07-27 NOTE — DISCHARGE NOTE NURSING/CASE MANAGEMENT/SOCIAL WORK - PATIENT PORTAL LINK FT
You can access the FollowMyHealth Patient Portal offered by [facility] by registering at the following website: [email or web address]. By joining [facility]’s FollowMyHealth portal, you will also be able to view your health information using other applications (apps) compatible with our system.

## 2023-07-27 NOTE — DISCHARGE NOTE PROVIDER - CARE PROVIDERS DIRECT ADDRESSES
[email or web address]

## 2023-07-27 NOTE — PROGRESS NOTE ADULT - SUBJECTIVE AND OBJECTIVE BOX
24H hour events: Tele with SR rates ~55-70s. Pt feeling well. Denies chest pain/palpitations/SOB    MEDICATIONS:  aMIOdarone    Tablet 400 milliGRAM(s) Oral two times a day  apixaban 5 milliGRAM(s) Oral every 12 hours  aspirin enteric coated 81 milliGRAM(s) Oral daily  clopidogrel Tablet 75 milliGRAM(s) Oral daily  hydrochlorothiazide 25 milliGRAM(s) Oral daily  metoprolol succinate ER 50 milliGRAM(s) Oral two times a day  valsartan 320 milliGRAM(s) Oral daily  atorvastatin 40 milliGRAM(s) Oral at bedtime  finasteride 5 milliGRAM(s) Oral daily    REVIEW OF SYSTEMS:  Complete 12point ROS negative.    PHYSICAL EXAM:  T(C): 36.7 (07-27-23 @ 04:42), Max: 37 (07-26-23 @ 14:47)  HR: 65 (07-27-23 @ 04:42) (60 - 72)  BP: 124/74 (07-27-23 @ 04:42) (124/74 - 141/81)  RR: 18 (07-27-23 @ 04:42) (17 - 18)  SpO2: 94% (07-27-23 @ 04:42) (94% - 98%)  Wt(kg): --  I&O's Summary      Appearance: Normal	  HEENT: NC/AT  Cardiovascular: Normal S1 S2  Respiratory: Lungs clear to auscultation	  Psychiatry: A & O x 3, Mood & affect appropriate  Neurologic: Non-focal  Extremities: No BLE edema        LABS:	 	    CBC Full  -  ( 27 Jul 2023 07:23 )  WBC Count : 7.58 K/uL  Hemoglobin : 15.4 g/dL  Hematocrit : 47.6 %  Platelet Count - Automated : 175 K/uL  Mean Cell Volume : 92.8 fl  Mean Cell Hemoglobin : 30.0 pg  Mean Cell Hemoglobin Concentration : 32.4 gm/dL    07-27    142  |  107  |  18  ----------------------------<  91  4.7   |  23  |  1.20  07-26    141  |  105  |  19  ----------------------------<  111<H>  4.6   |  21<L>  |  1.07    Ca    9.2      27 Jul 2023 07:17  Ca    9.6      26 Jul 2023 00:16    TPro  6.5  /  Alb  3.8  /  TBili  1.0  /  DBili  x   /  AST  35  /  ALT  49<H>  /  AlkPhos  92  07-27  TPro  7.1  /  Alb  4.1  /  TBili  0.9  /  DBili  x   /  AST  32  /  ALT  32  /  AlkPhos  107  07-26      proBNP: Pro-Brain Natriuretic Peptide (07.26.23 @ 00:16)    Pro-Brain Natriuretic Peptide: 412 pg/mL  TSH: Thyroid Stimulating Hormone, Serum: 1.01 uIU/mL (07-27 @ 07:23)    CARDIAC MARKERS: Troponin T, High Sensitivity (07.26.23 @ 02:27)    Troponin T, High Sensitivity Result: 1126: *  *  Rapid upward or downward changes in high-sensitivity troponin levels  suggest acute myocardial injury. Renal impairment may cause sustained  troponin elevations.  Normal: <6 - 14 ng/L  Indeterminate: 15-51 ng/L  Elevated: > 51 ng/L  See [email or web address]/test/[email or web address] on the [facility] intranet for more  information ng/L      RADIOLOGY: < from: Xray Chest 1 View- PORTABLE-Urgent (07.26.23 @ 01:06) >  FINDINGS:  LINES/TUBES/SUPPORT DEVICES: None    LUNGS/PLEURA: Small lung volumes without focal consolidation. No pleural   effusion or pneumothorax.    HEART AND MEDIASTINUM: Heart sizeis within normal limits.    BONES: No acute bony abnormality.    IMPRESSION:  Clear lungs.    < end of copied text >    PREVIOUS DIAGNOSTIC TESTING:    [ ] Echocardiogram: < from: TTE W or WO Ultrasound Enhancing Agent (07.22.23 @ 08:47) >  CONCLUSIONS:      1. Small left ventricular cavity size. The left ventricular wall thickness is mildly increased. The left ventricular systolic function is mildly decreased with an ejection fraction of 47 % by Molina's method of disks. There are regional wall motion abnormalities.   2. Basal and mid inferior wall and basal and mid inferolateral wall are abnormal.   3. There is mild (grade 1) left ventricular diastolic dysfunction, with normal filling pressure. Analysis of left ventricular diastolic function and filling pressure is made challenging by the presence of merged E and A wave.   4. Normal right ventricular cavity size, normal right ventricular wall thickness and reduced systolic right ventricular function.   5. There is hypokinesis of the right ventricular free wall and apex.   6. There is trace tricuspid regurgitation. Estimated pulmonary artery systolic pressure is 32 mmHg.   7. No evidence of aortic regurgitation.   8. No pericardial effusion seen.   9. Compared to the transthoracic echocardiogram performed on 7/12/2023 There is no change in LVEF however, there is more evident inferior and inferolateral wall motion abnormalities.    < end of copied text >      [ ]  Catheterization: < from: Cardiac Catheterization (07.22.23 @ 05:59) >    Conclusions:     STEMI of the right coronary artery   --There are multiple layers of stent in region of the acutely occluded  right coronary artery  --Left to right, Rentrope grade 3 collateral filling   Mild to moderate in-stent restenosis of the left main, left anterior  descending and first diagonal artery stents  Right dominant system   LVEDP = 18mmHg   No gradient across the aortic valve     Status post angioplasty of the severe in-stent restenosis of the right  coronary artery with resultant ZOEY 3 flow    Recommendations:     Keep right arm straight for 4 hours following removal of sheath  (radial band applied, Angiomax administered)  Continue aspirin 81mg daily    Patient should be transitioned to clopidogrel from ticagrelor   Resume anticoagulation therapy in 24 hours based upon clinical status,  CBC and radial access stability  Closely monitor for signs and symptoms of bleeding   Further conversations should be had with primary inteventional  cardiologist/Dr. Esteves concerning further treatment  recommendations for right coronary artery management   Recommend that a TTE be performed   Patient to be transferred to CICU for further observation and  management    Findings discussed with CICU team     < end of copied text >    
HPI:  Patient seen and examined at bedside on 3 DSU.  Tolerating amiodarone load well.    Review Of Systems:           Respiratory: No shortness of breath, cough, or wheezing  Cardiovascular: No chest pain or palpitations  10 point review of systems is otherwise negative except as mentioned above        Medications:  aMIOdarone    Tablet 400 milliGRAM(s) Oral two times a day  apixaban 5 milliGRAM(s) Oral every 12 hours  aspirin enteric coated 81 milliGRAM(s) Oral daily  atorvastatin 40 milliGRAM(s) Oral at bedtime  clopidogrel Tablet 75 milliGRAM(s) Oral daily  finasteride 5 milliGRAM(s) Oral daily  hydrochlorothiazide 25 milliGRAM(s) Oral daily  metoprolol succinate ER 50 milliGRAM(s) Oral two times a day  valsartan 320 milliGRAM(s) Oral daily    PAST MEDICAL & SURGICAL HISTORY:  Hypercholesteremia      HTN (hypertension)      H/O heart artery stent  2008, 2012      Asthma      MI (myocardial infarction)  2008      Melanoma      Ankle fracture      Atrial fibrillation  On Xarelto      No Past Surgical History      S/P angioplasty with stent      Melanoma  removal      History of coronary artery stent placement  2007, 2012, 2013        Vitals:  T(C): 36.7 (07-27-23 @ 04:42), Max: 36.9 (07-26-23 @ 20:51)  HR: 65 (07-27-23 @ 04:42) (60 - 65)  BP: 124/74 (07-27-23 @ 04:42) (124/74 - 132/78)  BP(mean): --  RR: 18 (07-27-23 @ 04:42) (18 - 18)  SpO2: 94% (07-27-23 @ 04:42) (94% - 97%)  Wt(kg): --  Daily     Daily   I&O's Summary    27 Jul 2023 07:01  -  27 Jul 2023 19:52  --------------------------------------------------------  IN: 120 mL / OUT: 0 mL / NET: 120 mL        Physical Exam:  Appearance: Normal, well groomed, NAD  Eyes: PERRLA, EOMI, pink conjunctiva, no scleral icterus   HENT: Normal oral mucosa  Cardiovascular: RRR, S1, S2, no murmur, rub, or gallop; no edema; no JVD  Procedural Access Site: Clean, dry, intact, without hematoma  Respiratory: Clear to auscultation bilaterally  Gastrointestinal: Soft, non-tender, non-distended, BS+  Musculoskeletal: No clubbing or joint deformity   Neurologic: No focal weakness  Lymphatic: No lymphadenopathy  Psychiatry: AAOx3 with appropriate mood and affect  Skin: No rashes, ecchymoses, or cyanosis                          15.4   7.58  )-----------( 175      ( 27 Jul 2023 07:23 )             47.6     07-27    142  |  107  |  18  ----------------------------<  91  4.7   |  23  |  1.20    Ca    9.2      27 Jul 2023 07:17    TPro  6.5  /  Alb  3.8  /  TBili  1.0  /  DBili  x   /  AST  35  /  ALT  49<H>  /  AlkPhos  92  07-27    PT/INR - ( 26 Jul 2023 00:16 )   PT: 11.8 sec;   INR: 1.02 ratio         PTT - ( 26 Jul 2023 00:16 )  PTT:33.2 sec  CARDIAC MARKERS ( 26 Jul 2023 02:27 )  x     / x     / 135 U/L / x     / 4.8 ng/mL      Cardiovascular Diagnostic Testing:  ECG: AFib with RVR    Echo: < from: TTE W or WO Ultrasound Enhancing Agent (07.22.23 @ 08:47) >  _______________________________________________________________________________________  CONCLUSIONS:      1. Small left ventricular cavity size. The left ventricular wall thickness is mildly increased. The left ventricular systolic function is mildly decreased with an ejection fraction of 47 % by Molina's method of disks. There are regional wall motion abnormalities.   2. Basal and mid inferior wall and basal and mid inferolateral wall are abnormal.   3. There is mild (grade 1) left ventricular diastolic dysfunction, with normal filling pressure. Analysis of left ventricular diastolic function and filling pressure is made challenging by the presence of merged E and A wave.   4. Normal right ventricular cavity size, normal right ventricular wall thickness and reduced systolic right ventricular function.   5. There is hypokinesis of the right ventricular free wall and apex.   6. There is trace tricuspid regurgitation. Estimated pulmonary artery systolic pressure is 32 mmHg.   7. No evidence of aortic regurgitation.   8. No pericardial effusion seen.   9. Compared to the transthoracic echocardiogram performed on 7/12/2023 There is no change in LVEF however, there is more evident inferior and inferolateral wall motion abnormalities.    ________________________________________________________________________________________    < end of copied text >      Stress Testing:    Cath:    < from: Cardiac Catheterization (07.22.23 @ 05:59) >  Conclusions:     STEMI of the right coronary artery   --There are multiple layers of stent in region of the acutely occluded  right coronary artery  --Left to right, Rentrope grade 3 collateral filling   Mild to moderate in-stent restenosis of the left main, left anterior  descending and first diagonal artery stents  Right dominant system   LVEDP = 18mmHg   No gradient across the aortic valve     Status post angioplasty of the severe in-stent restenosis of the right  coronary artery with resultant ZOEY 3 flow    < end of copied text >    Interpretation of Telemetry: AF with RVR, converted to NSR at 2 am without pause

## 2023-07-31 DIAGNOSIS — Z91.041 RADIOGRAPHIC DYE ALLERGY STATUS: ICD-10-CM

## 2023-08-07 ENCOUNTER — APPOINTMENT (OUTPATIENT)
Dept: CARDIOLOGY | Facility: CLINIC | Age: 68
End: 2023-08-07
Payer: COMMERCIAL

## 2023-08-07 ENCOUNTER — NON-APPOINTMENT (OUTPATIENT)
Age: 68
End: 2023-08-07

## 2023-08-07 VITALS
SYSTOLIC BLOOD PRESSURE: 98 MMHG | HEART RATE: 58 BPM | DIASTOLIC BLOOD PRESSURE: 60 MMHG | BODY MASS INDEX: 30.71 KG/M2 | WEIGHT: 214 LBS | OXYGEN SATURATION: 92 %

## 2023-08-07 PROCEDURE — 99214 OFFICE O/P EST MOD 30 MIN: CPT

## 2023-08-07 NOTE — DISCUSSION/SUMMARY
[FreeTextEntry1] : paroxysmal atrial fibrillation:continue amiodarone and Eliquis. ASHD: clinically stable with no current chest discomforta Cough: unclear etiology.Try over the counter cough suppressants for now.Recall if symptoms persist. Return visit two months.  Total time of the encounter: 30 minutes which included but was not limited to the following: Face-to-face and non face-to-face time personally spent by the physician preparing to see the patient, obtaining and/or resuming separately obtained history, performing a medically appropriate examination and/or evaluation, counseling and educating the patient/family/caregiver, ordering medications, tests or procedures, referring and communicating with other healthcare professionals, documenting clinical information in the electronic health record, independently interpreting results and communicated results to the patient/family/caregiver and care coordination.

## 2023-08-07 NOTE — REASON FOR VISIT
[FreeTextEntry1] : The person was recently hospitalized with an episode of atrial fibrillation . Was started on amiodarone with prompt resolution of his Atrial fibrillation within 48 hours.Has been feeling well since that Except with some difficulties with a throaty cough. Recently completed the amiodarone And we'll be continuing 200 milligrams daily pending evaluation for ablation.

## 2023-08-17 ENCOUNTER — NON-APPOINTMENT (OUTPATIENT)
Age: 68
End: 2023-08-17

## 2023-08-18 ENCOUNTER — APPOINTMENT (OUTPATIENT)
Dept: CT IMAGING | Facility: CLINIC | Age: 68
End: 2023-08-18
Payer: COMMERCIAL

## 2023-08-18 ENCOUNTER — OUTPATIENT (OUTPATIENT)
Dept: OUTPATIENT SERVICES | Facility: HOSPITAL | Age: 68
LOS: 1 days | End: 2023-08-18
Payer: COMMERCIAL

## 2023-08-18 ENCOUNTER — TRANSCRIPTION ENCOUNTER (OUTPATIENT)
Age: 68
End: 2023-08-18

## 2023-08-18 DIAGNOSIS — Z00.00 ENCOUNTER FOR GENERAL ADULT MEDICAL EXAMINATION WITHOUT ABNORMAL FINDINGS: ICD-10-CM

## 2023-08-18 DIAGNOSIS — Z95.5 PRESENCE OF CORONARY ANGIOPLASTY IMPLANT AND GRAFT: Chronic | ICD-10-CM

## 2023-08-18 DIAGNOSIS — C43.9 MALIGNANT MELANOMA OF SKIN, UNSPECIFIED: Chronic | ICD-10-CM

## 2023-08-18 PROCEDURE — 70498 CT ANGIOGRAPHY NECK: CPT

## 2023-08-18 PROCEDURE — 70496 CT ANGIOGRAPHY HEAD: CPT | Mod: 26

## 2023-08-18 PROCEDURE — 70496 CT ANGIOGRAPHY HEAD: CPT

## 2023-08-18 PROCEDURE — 70498 CT ANGIOGRAPHY NECK: CPT | Mod: 26

## 2023-09-11 ENCOUNTER — TRANSCRIPTION ENCOUNTER (OUTPATIENT)
Age: 68
End: 2023-09-11

## 2023-09-11 RX ORDER — CARVEDILOL 3.12 MG/1
3.12 TABLET, FILM COATED ORAL
Qty: 180 | Refills: 3 | Status: DISCONTINUED | COMMUNITY
Start: 2023-07-25 | End: 2023-09-11

## 2023-09-12 ENCOUNTER — TRANSCRIPTION ENCOUNTER (OUTPATIENT)
Age: 68
End: 2023-09-12

## 2023-09-29 ENCOUNTER — APPOINTMENT (OUTPATIENT)
Dept: ELECTROPHYSIOLOGY | Facility: CLINIC | Age: 68
End: 2023-09-29
Payer: COMMERCIAL

## 2023-09-29 ENCOUNTER — NON-APPOINTMENT (OUTPATIENT)
Age: 68
End: 2023-09-29

## 2023-09-29 VITALS
OXYGEN SATURATION: 94 % | BODY MASS INDEX: 31.07 KG/M2 | HEART RATE: 62 BPM | WEIGHT: 217 LBS | DIASTOLIC BLOOD PRESSURE: 87 MMHG | HEIGHT: 70 IN | SYSTOLIC BLOOD PRESSURE: 132 MMHG

## 2023-09-29 PROCEDURE — 93000 ELECTROCARDIOGRAM COMPLETE: CPT

## 2023-09-29 PROCEDURE — 99214 OFFICE O/P EST MOD 30 MIN: CPT | Mod: 25

## 2023-09-29 RX ORDER — PREDNISONE 10 MG/1
10 TABLET ORAL
Qty: 1 | Refills: 1 | Status: DISCONTINUED | COMMUNITY
Start: 2022-12-07 | End: 2023-09-29

## 2023-09-29 RX ORDER — TADALAFIL 20 MG/1
20 TABLET ORAL
Qty: 6 | Refills: 3 | Status: DISCONTINUED | COMMUNITY
Start: 2021-06-02 | End: 2023-09-29

## 2023-09-29 RX ORDER — EPINEPHRINE 0.3 MG/.3ML
0.3 INJECTION INTRAMUSCULAR
Qty: 1 | Refills: 0 | Status: DISCONTINUED | COMMUNITY
Start: 2021-05-12 | End: 2023-09-29

## 2023-09-29 RX ORDER — DIPHENHYDRAMINE HCL 25 MG/1
25 CAPSULE ORAL
Qty: 2 | Refills: 0 | Status: DISCONTINUED | COMMUNITY
Start: 2023-07-31 | End: 2023-09-29

## 2023-09-29 RX ORDER — AMIODARONE HYDROCHLORIDE 200 MG/1
200 TABLET ORAL DAILY
Qty: 90 | Refills: 1 | Status: DISCONTINUED | COMMUNITY
Start: 2023-08-18 | End: 2023-09-29

## 2023-09-29 RX ORDER — METHYLPREDNISOLONE 32 MG/1
32 TABLET ORAL
Qty: 3 | Refills: 0 | Status: DISCONTINUED | COMMUNITY
Start: 2023-07-31 | End: 2023-09-29

## 2023-10-01 ENCOUNTER — NON-APPOINTMENT (OUTPATIENT)
Age: 68
End: 2023-10-01

## 2023-11-03 ENCOUNTER — OUTPATIENT (OUTPATIENT)
Dept: OUTPATIENT SERVICES | Facility: HOSPITAL | Age: 68
LOS: 1 days | End: 2023-11-03
Payer: COMMERCIAL

## 2023-11-03 VITALS
DIASTOLIC BLOOD PRESSURE: 73 MMHG | HEIGHT: 70 IN | OXYGEN SATURATION: 96 % | SYSTOLIC BLOOD PRESSURE: 121 MMHG | WEIGHT: 216.93 LBS | TEMPERATURE: 98 F | HEART RATE: 64 BPM | RESPIRATION RATE: 18 BRPM

## 2023-11-03 DIAGNOSIS — C43.9 MALIGNANT MELANOMA OF SKIN, UNSPECIFIED: Chronic | ICD-10-CM

## 2023-11-03 DIAGNOSIS — Z95.5 PRESENCE OF CORONARY ANGIOPLASTY IMPLANT AND GRAFT: Chronic | ICD-10-CM

## 2023-11-03 DIAGNOSIS — I48.91 UNSPECIFIED ATRIAL FIBRILLATION: ICD-10-CM

## 2023-11-03 DIAGNOSIS — I48.0 PAROXYSMAL ATRIAL FIBRILLATION: ICD-10-CM

## 2023-11-03 DIAGNOSIS — Z01.818 ENCOUNTER FOR OTHER PREPROCEDURAL EXAMINATION: ICD-10-CM

## 2023-11-03 DIAGNOSIS — I10 ESSENTIAL (PRIMARY) HYPERTENSION: ICD-10-CM

## 2023-11-03 LAB
ANION GAP SERPL CALC-SCNC: 14 MMOL/L — SIGNIFICANT CHANGE UP (ref 5–17)
ANION GAP SERPL CALC-SCNC: 14 MMOL/L — SIGNIFICANT CHANGE UP (ref 5–17)
BLD GP AB SCN SERPL QL: NEGATIVE — SIGNIFICANT CHANGE UP
BLD GP AB SCN SERPL QL: NEGATIVE — SIGNIFICANT CHANGE UP
BUN SERPL-MCNC: 18 MG/DL — SIGNIFICANT CHANGE UP (ref 7–23)
BUN SERPL-MCNC: 18 MG/DL — SIGNIFICANT CHANGE UP (ref 7–23)
CALCIUM SERPL-MCNC: 9.7 MG/DL — SIGNIFICANT CHANGE UP (ref 8.4–10.5)
CALCIUM SERPL-MCNC: 9.7 MG/DL — SIGNIFICANT CHANGE UP (ref 8.4–10.5)
CHLORIDE SERPL-SCNC: 101 MMOL/L — SIGNIFICANT CHANGE UP (ref 96–108)
CHLORIDE SERPL-SCNC: 101 MMOL/L — SIGNIFICANT CHANGE UP (ref 96–108)
CO2 SERPL-SCNC: 25 MMOL/L — SIGNIFICANT CHANGE UP (ref 22–31)
CO2 SERPL-SCNC: 25 MMOL/L — SIGNIFICANT CHANGE UP (ref 22–31)
CREAT SERPL-MCNC: 1.07 MG/DL — SIGNIFICANT CHANGE UP (ref 0.5–1.3)
CREAT SERPL-MCNC: 1.07 MG/DL — SIGNIFICANT CHANGE UP (ref 0.5–1.3)
EGFR: 76 ML/MIN/1.73M2 — SIGNIFICANT CHANGE UP
EGFR: 76 ML/MIN/1.73M2 — SIGNIFICANT CHANGE UP
GLUCOSE SERPL-MCNC: 107 MG/DL — HIGH (ref 70–99)
GLUCOSE SERPL-MCNC: 107 MG/DL — HIGH (ref 70–99)
HCT VFR BLD CALC: 45.5 % — SIGNIFICANT CHANGE UP (ref 39–50)
HCT VFR BLD CALC: 45.5 % — SIGNIFICANT CHANGE UP (ref 39–50)
HGB BLD-MCNC: 15.5 G/DL — SIGNIFICANT CHANGE UP (ref 13–17)
HGB BLD-MCNC: 15.5 G/DL — SIGNIFICANT CHANGE UP (ref 13–17)
MCHC RBC-ENTMCNC: 30.3 PG — SIGNIFICANT CHANGE UP (ref 27–34)
MCHC RBC-ENTMCNC: 30.3 PG — SIGNIFICANT CHANGE UP (ref 27–34)
MCHC RBC-ENTMCNC: 34.1 GM/DL — SIGNIFICANT CHANGE UP (ref 32–36)
MCHC RBC-ENTMCNC: 34.1 GM/DL — SIGNIFICANT CHANGE UP (ref 32–36)
MCV RBC AUTO: 89 FL — SIGNIFICANT CHANGE UP (ref 80–100)
MCV RBC AUTO: 89 FL — SIGNIFICANT CHANGE UP (ref 80–100)
NRBC # BLD: 0 /100 WBCS — SIGNIFICANT CHANGE UP (ref 0–0)
NRBC # BLD: 0 /100 WBCS — SIGNIFICANT CHANGE UP (ref 0–0)
PLATELET # BLD AUTO: 203 K/UL — SIGNIFICANT CHANGE UP (ref 150–400)
PLATELET # BLD AUTO: 203 K/UL — SIGNIFICANT CHANGE UP (ref 150–400)
POTASSIUM SERPL-MCNC: 3.7 MMOL/L — SIGNIFICANT CHANGE UP (ref 3.5–5.3)
POTASSIUM SERPL-MCNC: 3.7 MMOL/L — SIGNIFICANT CHANGE UP (ref 3.5–5.3)
POTASSIUM SERPL-SCNC: 3.7 MMOL/L — SIGNIFICANT CHANGE UP (ref 3.5–5.3)
POTASSIUM SERPL-SCNC: 3.7 MMOL/L — SIGNIFICANT CHANGE UP (ref 3.5–5.3)
RBC # BLD: 5.11 M/UL — SIGNIFICANT CHANGE UP (ref 4.2–5.8)
RBC # BLD: 5.11 M/UL — SIGNIFICANT CHANGE UP (ref 4.2–5.8)
RBC # FLD: 13.1 % — SIGNIFICANT CHANGE UP (ref 10.3–14.5)
RBC # FLD: 13.1 % — SIGNIFICANT CHANGE UP (ref 10.3–14.5)
RH IG SCN BLD-IMP: POSITIVE — SIGNIFICANT CHANGE UP
RH IG SCN BLD-IMP: POSITIVE — SIGNIFICANT CHANGE UP
SODIUM SERPL-SCNC: 140 MMOL/L — SIGNIFICANT CHANGE UP (ref 135–145)
SODIUM SERPL-SCNC: 140 MMOL/L — SIGNIFICANT CHANGE UP (ref 135–145)
WBC # BLD: 6.79 K/UL — SIGNIFICANT CHANGE UP (ref 3.8–10.5)
WBC # BLD: 6.79 K/UL — SIGNIFICANT CHANGE UP (ref 3.8–10.5)
WBC # FLD AUTO: 6.79 K/UL — SIGNIFICANT CHANGE UP (ref 3.8–10.5)
WBC # FLD AUTO: 6.79 K/UL — SIGNIFICANT CHANGE UP (ref 3.8–10.5)

## 2023-11-03 PROCEDURE — 86901 BLOOD TYPING SEROLOGIC RH(D): CPT

## 2023-11-03 PROCEDURE — G0463: CPT

## 2023-11-03 PROCEDURE — 86850 RBC ANTIBODY SCREEN: CPT

## 2023-11-03 PROCEDURE — 80048 BASIC METABOLIC PNL TOTAL CA: CPT

## 2023-11-03 PROCEDURE — 86900 BLOOD TYPING SEROLOGIC ABO: CPT

## 2023-11-03 PROCEDURE — 85027 COMPLETE CBC AUTOMATED: CPT

## 2023-11-03 RX ORDER — DOCUSATE SODIUM 100 MG
0 CAPSULE ORAL
Refills: 0 | DISCHARGE

## 2023-11-03 NOTE — H&P PST ADULT - NSICDXPASTSURGICALHX_GEN_ALL_CORE_FT
PAST SURGICAL HISTORY:  History of coronary artery stent placement 2007, 2012, 2013    Melanoma removal    S/P angioplasty with stent

## 2023-11-03 NOTE — H&P PST ADULT - PROBLEM SELECTOR PLAN 1
Preop instructions given  Labs CBC BMP T&S performed in PST  Last dose of Eliquis on 11/14 (PM dose) as per Surgeon's note  May continue with Aspirin regimen in the setting of CAD

## 2023-11-03 NOTE — H&P PST ADULT - ANESTHESIA, PREVIOUS REACTION, PROFILE
[facility] Neurology Note    No stroke or acute pathology on MRI. Incidental arachnoid cyst. If pt's symptoms have improved/resolved, he can discharge home. If symptoms still bothersome, could consider PT, meclizine, anti-emetic.     Arlen Mendoza, NP    
none

## 2023-11-03 NOTE — H&P PST ADULT - ASSESSMENT
DASI Score:  DASI Activity: able to go up one flight of stairs or walk 1-2 blocks with out difficulty  Loose or removable teeth: denies   DASI Score: 7.5  DASI Activity: Performs all self care, drives able to go up one flight of stairs or walk 1-2 blocks with out difficulty  Loose or removable teeth: denies

## 2023-11-03 NOTE — H&P PST ADULT - HISTORY OF PRESENT ILLNESS
This is a 68 year old male with past medical history of HTN, HLD, CAD s/p stent 10 (recent PCI), denies being on Plavix , AFIB on Eliquis and amiodarone  Was admitted in July  with symptomatic AFib with RVR. Today presenting ot PST for scheduled AFIB ablation on 11/15/23 with Dr. Olson.          This is a 68 year old male with past medical history of HTN, HLD, CAD s/p stents x 10  last stent 2021 however (recent POBA to RCA in July and with symptomatic AFib with RVR), denies being on Plavix , AFIB on Eliquis and amiodarone. Today presenting to [facility] for scheduled AFIB ablation on 11/15/23 with Dr. Olson. Currently, denies any lightheadedness, dizziness, CP, palpitation or SOB.        This is a 68 year old male with past medical history of HTN, HLD, CAD s/p stents x 10  (last stent 2021 however recent POBA to RCA in July while admitted with symptomatic AFib with RVR), denies being on Plavix, AFIB on Eliquis and amiodarone currently. Today presenting to PST for scheduled AFIB ablation on 11/15/23 with Dr. Olson. Currently, denies any lightheadedness, dizziness, CP, palpitation or SOB.

## 2023-11-06 ENCOUNTER — NON-APPOINTMENT (OUTPATIENT)
Age: 68
End: 2023-11-06

## 2023-11-15 ENCOUNTER — TRANSCRIPTION ENCOUNTER (OUTPATIENT)
Age: 68
End: 2023-11-15

## 2023-11-15 ENCOUNTER — OUTPATIENT (OUTPATIENT)
Dept: INPATIENT UNIT | Facility: HOSPITAL | Age: 68
LOS: 1 days | End: 2023-11-15
Payer: COMMERCIAL

## 2023-11-15 VITALS
DIASTOLIC BLOOD PRESSURE: 79 MMHG | OXYGEN SATURATION: 95 % | SYSTOLIC BLOOD PRESSURE: 155 MMHG | RESPIRATION RATE: 16 BRPM | HEART RATE: 56 BPM | TEMPERATURE: 98 F

## 2023-11-15 VITALS
HEART RATE: 71 BPM | TEMPERATURE: 98 F | OXYGEN SATURATION: 96 % | RESPIRATION RATE: 18 BRPM | SYSTOLIC BLOOD PRESSURE: 155 MMHG | DIASTOLIC BLOOD PRESSURE: 67 MMHG

## 2023-11-15 DIAGNOSIS — Z95.5 PRESENCE OF CORONARY ANGIOPLASTY IMPLANT AND GRAFT: Chronic | ICD-10-CM

## 2023-11-15 DIAGNOSIS — I48.0 PAROXYSMAL ATRIAL FIBRILLATION: ICD-10-CM

## 2023-11-15 DIAGNOSIS — C43.9 MALIGNANT MELANOMA OF SKIN, UNSPECIFIED: Chronic | ICD-10-CM

## 2023-11-15 LAB
BLD GP AB SCN SERPL QL: NEGATIVE — SIGNIFICANT CHANGE UP
BLD GP AB SCN SERPL QL: NEGATIVE — SIGNIFICANT CHANGE UP
RH IG SCN BLD-IMP: POSITIVE — SIGNIFICANT CHANGE UP
RH IG SCN BLD-IMP: POSITIVE — SIGNIFICANT CHANGE UP

## 2023-11-15 PROCEDURE — 93655 ICAR CATH ABLTJ DSCRT ARRHYT: CPT

## 2023-11-15 PROCEDURE — C1730: CPT

## 2023-11-15 PROCEDURE — 86850 RBC ANTIBODY SCREEN: CPT

## 2023-11-15 PROCEDURE — 86900 BLOOD TYPING SEROLOGIC ABO: CPT

## 2023-11-15 PROCEDURE — C1766: CPT

## 2023-11-15 PROCEDURE — 86901 BLOOD TYPING SEROLOGIC RH(D): CPT

## 2023-11-15 PROCEDURE — C1732: CPT

## 2023-11-15 PROCEDURE — C1760: CPT

## 2023-11-15 PROCEDURE — C1759: CPT

## 2023-11-15 PROCEDURE — 93656 COMPRE EP EVAL ABLTJ ATR FIB: CPT

## 2023-11-15 PROCEDURE — 93623 PRGRMD STIMJ&PACG IV RX NFS: CPT | Mod: 26

## 2023-11-15 PROCEDURE — C1894: CPT

## 2023-11-15 PROCEDURE — 93005 ELECTROCARDIOGRAM TRACING: CPT

## 2023-11-15 PROCEDURE — 93010 ELECTROCARDIOGRAM REPORT: CPT | Mod: 77

## 2023-11-15 PROCEDURE — 93623 PRGRMD STIMJ&PACG IV RX NFS: CPT

## 2023-11-15 RX ORDER — ASPIRIN/CALCIUM CARB/MAGNESIUM 324 MG
81 TABLET ORAL DAILY
Refills: 0 | Status: DISCONTINUED | OUTPATIENT
Start: 2023-11-15 | End: 2023-11-15

## 2023-11-15 RX ORDER — COLCHICINE 0.6 MG
0.6 TABLET ORAL DAILY
Refills: 0 | Status: DISCONTINUED | OUTPATIENT
Start: 2023-11-15 | End: 2023-11-15

## 2023-11-15 RX ORDER — COLCHICINE 0.6 MG
1 TABLET ORAL
Qty: 7 | Refills: 0
Start: 2023-11-15 | End: 2023-11-21

## 2023-11-15 RX ORDER — FINASTERIDE 5 MG/1
1 TABLET, FILM COATED ORAL
Refills: 0 | DISCHARGE

## 2023-11-15 RX ORDER — AMIODARONE HYDROCHLORIDE 400 MG/1
1 TABLET ORAL
Refills: 0 | DISCHARGE

## 2023-11-15 RX ORDER — FINASTERIDE 5 MG/1
5 TABLET, FILM COATED ORAL DAILY
Refills: 0 | Status: DISCONTINUED | OUTPATIENT
Start: 2023-11-15 | End: 2023-11-15

## 2023-11-15 RX ORDER — ROSUVASTATIN CALCIUM 5 MG/1
1 TABLET ORAL
Qty: 0 | Refills: 0 | DISCHARGE

## 2023-11-15 RX ORDER — ASPIRIN/CALCIUM CARB/MAGNESIUM 324 MG
1 TABLET ORAL
Qty: 0 | Refills: 0 | DISCHARGE
End: 2023-08-03

## 2023-11-15 RX ORDER — ISOSORBIDE MONONITRATE 60 MG/1
1 TABLET, EXTENDED RELEASE ORAL
Refills: 0 | DISCHARGE

## 2023-11-15 RX ORDER — VALSARTAN 80 MG/1
320 TABLET ORAL DAILY
Refills: 0 | Status: DISCONTINUED | OUTPATIENT
Start: 2023-11-15 | End: 2023-11-15

## 2023-11-15 RX ORDER — UBIDECARENONE 100 MG
200 CAPSULE ORAL
Refills: 0 | DISCHARGE

## 2023-11-15 RX ORDER — ACETAMINOPHEN 500 MG
1000 TABLET ORAL ONCE
Refills: 0 | Status: COMPLETED | OUTPATIENT
Start: 2023-11-15 | End: 2023-11-15

## 2023-11-15 RX ORDER — METOPROLOL TARTRATE 50 MG
50 TABLET ORAL DAILY
Refills: 0 | Status: DISCONTINUED | OUTPATIENT
Start: 2023-11-15 | End: 2023-11-15

## 2023-11-15 RX ORDER — APIXABAN 2.5 MG/1
5 TABLET, FILM COATED ORAL
Refills: 0 | Status: DISCONTINUED | OUTPATIENT
Start: 2023-11-15 | End: 2023-11-15

## 2023-11-15 RX ORDER — PANTOPRAZOLE SODIUM 20 MG/1
40 TABLET, DELAYED RELEASE ORAL
Refills: 0 | Status: DISCONTINUED | OUTPATIENT
Start: 2023-11-15 | End: 2023-11-15

## 2023-11-15 RX ORDER — PANTOPRAZOLE SODIUM 20 MG/1
1 TABLET, DELAYED RELEASE ORAL
Qty: 84 | Refills: 0
Start: 2023-11-15 | End: 2023-12-26

## 2023-11-15 RX ORDER — ISOSORBIDE MONONITRATE 60 MG/1
60 TABLET, EXTENDED RELEASE ORAL DAILY
Refills: 0 | Status: DISCONTINUED | OUTPATIENT
Start: 2023-11-15 | End: 2023-11-15

## 2023-11-15 RX ADMIN — APIXABAN 5 MILLIGRAM(S): 2.5 TABLET, FILM COATED ORAL at 17:39

## 2023-11-15 RX ADMIN — Medication 400 MILLIGRAM(S): at 13:01

## 2023-11-15 RX ADMIN — Medication 1000 MILLIGRAM(S): at 13:25

## 2023-11-15 RX ADMIN — PANTOPRAZOLE SODIUM 40 MILLIGRAM(S): 20 TABLET, DELAYED RELEASE ORAL at 17:39

## 2023-11-15 NOTE — ASU DISCHARGE PLAN (ADULT/PEDIATRIC) - ASU DC SPECIAL INSTRUCTIONSFT
WOUND CARE:  The day AFTER your procedure  - Remove the bandage at the site GENTLY, clean with mild soap and water, and pat dry; leave open to air  - You may shower   - DO NOT apply lotions, creams, ointments, powder, perfumes to your incision site  -Check your groin every day. A small amount of bruising or soarness is normal, a bump ( smaller than nickel) might be present, normal  - DO NOT SOAK your site for 1 week ( no baths, no pools, no tubs, etc..)    ACTIVITY:  YOur procedure was done through your groin  for the next 5 DAYS:  - Limit climbing stairs, no strenuous activity, pushing , pulling, or straining   DO NOT LIFT anything 10 lbs or heavier   you may resume sexual activity in 7 days, unless instructed otherwise  mild palpitations are normal     Follow heart healthy diet recommended by your doctor, , if you smoke STOP SMOKING ( may call [phone] for center of tobacco control if you need assistance).  for the next 24 hours:   - stay at home and rest, do not drive or operate heavy machinery   do not drink alcoholic beverages   do not make important personal or business decisions     ***CALL YOUR DOCTOR ***  IF you have fever, chills, body aches, or severe pain, swelling, redness, heat, yellow drainage from your incision site  IF bleeding  or significant new swelling from your puncture site  IF you experience rapid heartbeat or palpitations that cause: lightheadedness, dizziness, or fainting spell.  If you experience difficulty swallowing, or pain with swallowing   IF unable to ge tin contact with your doctor, you may call the Cardiology Office at [facility] at [phone]

## 2023-11-15 NOTE — PATIENT PROFILE ADULT - FALL HARM RISK - HARM RISK INTERVENTIONS

## 2023-11-15 NOTE — PRE-ANESTHESIA EVALUATION ADULT - NSANTHPMHFT_GEN_ALL_CORE
This is a 68 year old male with past medical history of HTN, HLD, CAD s/p stents x 10  (last stent 2021 however recent POBA to RCA in July while admitted with symptomatic AFib with RVR), denies being on Plavix, AFIB on Eliquis and amiodarone currently. Today presenting to PST for scheduled AFIB ablation on 11/15/23 with Dr. Olson. Currently, denies any lightheadedness, dizziness, CP, palpitation or SOB.

## 2023-11-15 NOTE — ASU DISCHARGE PLAN (ADULT/PEDIATRIC) - NS MD DC FALL RISK RISK
For information on Fall & Injury Prevention, visit: [email or web address] OR  [email or web address] OR  https://www.cdc.gov/steadi/patient.html

## 2023-11-15 NOTE — ASU DISCHARGE PLAN (ADULT/PEDIATRIC) - CARE PROVIDER_API CALL
Cuco Olson  Cardiovascular Disease  [address] [place] [address]  Phone: [phone]  Fax: [phone]  Scheduled Appointment: 12/29/2023 10:00 AM

## 2023-11-15 NOTE — CHART NOTE - NSCHARTNOTEFT_GEN_A_CORE
s/p Afib Ablation now recovering in CRS with no acute events  pt is wake and alert denies any pain or discomfort; family at bedside  vitals stable and currently: /80 HR 80s RR 16/min afebrile, O2 sat 98% on RA  post ECG: NSR at 81 bpm   RFV soft wo bleeding or hematoma, BR as per protocol     restart Eliquis at 18:00  STOP Amiodarone   START Protonix 40mg PO BID x 6 weeks ( Rx sent to pharmacy)   START Colchicine 0.6mg PO daily x 1 week ( Rx sent to pharmacy), please HOLD Crestor x1 week while taking Colchicine.  cont Toprol 50mg daily   cont rest of home meds     PLAN: home today after minimum 4 hours monitoring ( please touch base with Dr Olson for specific DC time)  f/u Dr. Olson 12/29 10:00am  provider hand off updated on [place]

## 2023-11-16 ENCOUNTER — NON-APPOINTMENT (OUTPATIENT)
Age: 68
End: 2023-11-16

## 2023-11-19 ENCOUNTER — RX RENEWAL (OUTPATIENT)
Age: 68
End: 2023-11-19

## 2023-12-08 ENCOUNTER — NON-APPOINTMENT (OUTPATIENT)
Age: 68
End: 2023-12-08

## 2023-12-08 ENCOUNTER — APPOINTMENT (OUTPATIENT)
Dept: CARDIOLOGY | Facility: CLINIC | Age: 68
End: 2023-12-08
Payer: COMMERCIAL

## 2023-12-08 VITALS
SYSTOLIC BLOOD PRESSURE: 120 MMHG | WEIGHT: 217 LBS | BODY MASS INDEX: 31.14 KG/M2 | HEART RATE: 70 BPM | OXYGEN SATURATION: 97 % | DIASTOLIC BLOOD PRESSURE: 68 MMHG

## 2023-12-08 DIAGNOSIS — Z98.890 OTHER SPECIFIED POSTPROCEDURAL STATES: ICD-10-CM

## 2023-12-08 DIAGNOSIS — Z86.79 OTHER SPECIFIED POSTPROCEDURAL STATES: ICD-10-CM

## 2023-12-08 DIAGNOSIS — I48.0 PAROXYSMAL ATRIAL FIBRILLATION: ICD-10-CM

## 2023-12-08 DIAGNOSIS — R73.03 PREDIABETES.: ICD-10-CM

## 2023-12-08 PROCEDURE — 93000 ELECTROCARDIOGRAM COMPLETE: CPT

## 2023-12-08 PROCEDURE — 99214 OFFICE O/P EST MOD 30 MIN: CPT | Mod: 25

## 2023-12-08 RX ORDER — ROSUVASTATIN CALCIUM 40 MG/1
40 TABLET, FILM COATED ORAL
Qty: 90 | Refills: 3 | Status: ACTIVE | COMMUNITY
Start: 2019-05-07 | End: 1900-01-01

## 2023-12-08 RX ORDER — PREDNISONE 50 MG/1
50 TABLET ORAL DAILY
Qty: 30 | Refills: 0 | Status: ACTIVE | COMMUNITY
Start: 2023-12-08 | End: 1900-01-01

## 2023-12-08 RX ORDER — AMIODARONE HYDROCHLORIDE 200 MG/1
200 TABLET ORAL DAILY
Refills: 0 | Status: DISCONTINUED | COMMUNITY
End: 2023-12-08

## 2023-12-08 RX ORDER — PANTOPRAZOLE 40 MG/1
40 TABLET, DELAYED RELEASE ORAL
Qty: 180 | Refills: 2 | Status: DISCONTINUED | COMMUNITY
Start: 1900-01-01 | End: 2023-12-08

## 2023-12-12 ENCOUNTER — TRANSCRIPTION ENCOUNTER (OUTPATIENT)
Age: 68
End: 2023-12-12

## 2023-12-12 ENCOUNTER — NON-APPOINTMENT (OUTPATIENT)
Age: 68
End: 2023-12-12

## 2023-12-13 ENCOUNTER — TRANSCRIPTION ENCOUNTER (OUTPATIENT)
Age: 68
End: 2023-12-13

## 2023-12-14 ENCOUNTER — RX RENEWAL (OUTPATIENT)
Age: 68
End: 2023-12-14

## 2023-12-14 RX ORDER — VALSARTAN AND HYDROCHLOROTHIAZIDE 320; 25 MG/1; MG/1
320-25 TABLET, FILM COATED ORAL
Qty: 90 | Refills: 3 | Status: ACTIVE | COMMUNITY
Start: 2023-05-17 | End: 1900-01-01

## 2024-01-02 PROBLEM — G47.33 OSA (OBSTRUCTIVE SLEEP APNEA): Status: ACTIVE | Noted: 2019-06-04

## 2024-01-05 ENCOUNTER — NON-APPOINTMENT (OUTPATIENT)
Age: 69
End: 2024-01-05

## 2024-01-05 ENCOUNTER — APPOINTMENT (OUTPATIENT)
Dept: ELECTROPHYSIOLOGY | Facility: CLINIC | Age: 69
End: 2024-01-05
Payer: COMMERCIAL

## 2024-01-05 VITALS
WEIGHT: 217 LBS | BODY MASS INDEX: 31.14 KG/M2 | SYSTOLIC BLOOD PRESSURE: 122 MMHG | DIASTOLIC BLOOD PRESSURE: 83 MMHG | OXYGEN SATURATION: 96 % | HEART RATE: 64 BPM

## 2024-01-05 DIAGNOSIS — G47.33 OBSTRUCTIVE SLEEP APNEA (ADULT) (PEDIATRIC): ICD-10-CM

## 2024-01-05 DIAGNOSIS — Z95.5 PRESENCE OF CORONARY ANGIOPLASTY IMPLANT AND GRAFT: ICD-10-CM

## 2024-01-05 PROCEDURE — 99215 OFFICE O/P EST HI 40 MIN: CPT | Mod: 25

## 2024-01-05 PROCEDURE — 93000 ELECTROCARDIOGRAM COMPLETE: CPT

## 2024-01-05 RX ORDER — ISOSORBIDE MONONITRATE 60 MG/1
60 TABLET, EXTENDED RELEASE ORAL
Qty: 90 | Refills: 3 | Status: DISCONTINUED | COMMUNITY
Start: 2020-10-27 | End: 2024-01-05

## 2024-01-05 RX ORDER — METOPROLOL TARTRATE 25 MG/1
25 TABLET, FILM COATED ORAL
Refills: 0 | Status: ACTIVE | COMMUNITY
Start: 2024-01-05

## 2024-01-05 NOTE — CARDIOLOGY SUMMARY
[de-identified] : TTE from 7/22/2023: EF: 47%, regional WMAs, basal and mid inferior wall as well as basal and mid inferolateral walls are abnormal, grade 1 diastolic dysfunction, normal RV size with reduced RVSF, trace TR, no AI, no pericardial effusion, normal LA size (LA not well visualized) [de-identified] : ECG from 1/5/2024: Sinus rhythm at 60bpm, inferior q waves ECG from 9/29/2023: Sinus rhythm at 65bpm, inferior q waves ECG from 7/26/2023: SR at 71bpm ECG from 7/25/2023: AF at 154bpm ECG from 7/22/2023: AF at 124bpm with IWSTEMI ECG from 7/12/2023: AF at 112bpm [de-identified] : Ablation from 11/15/2023: CTI, normal LA voltage, exit mapping used for LPVs - brad, first pass of R PVs, acute reconnection of LSPV - roof, dormant RIPV - brad, confirmed no dormant of RIPV following additional ablation [de-identified] : [facility] from 7/22/2023 (Dr. Ralph Rosales): STEMI of the right coronary artery, multiple layers of stent in region of the acutely occluded RCA, mild to moderate in-stent restenosis of the left main, left anterior descending and first diagonal artery stents, right dominant system, LVEDP = 18mmHg

## 2024-01-05 NOTE — REVIEW OF SYSTEMS
[Negative] : Heme/Lymph [FreeTextEntry3] : One of his eyes still has poor vision [FreeTextEntry5] : see HPI

## 2024-01-05 NOTE — PHYSICAL EXAM
[Well Developed] : well developed [Well Nourished] : well nourished [No Acute Distress] : no acute distress [Normal Venous Pressure] : normal venous pressure [Normal S1, S2] : normal S1, S2 [No Murmur] : no murmur [No Rub] : no rub [No Gallop] : no gallop [Clear Lung Fields] : clear lung fields [Good Air Entry] : good air entry [No Respiratory Distress] : no respiratory distress  [Soft] : abdomen soft [Non Tender] : non-tender [Normal Gait] : normal gait [No Edema] : no edema [No Cyanosis] : no cyanosis [No Clubbing] : no clubbing [No Varicosities] : no varicosities [No Rash] : no rash [Moves all extremities] : moves all extremities [Normal Speech] : normal speech [Alert and Oriented] : alert and oriented [Normal memory] : normal memory

## 2024-01-05 NOTE — HISTORY OF PRESENT ILLNESS
[FreeTextEntry1] : Mr. Kyle Contreras is a 68-year-old man with hypertension, hyperlipidemia, obstructive sleep apnea (not adherent to CPAP), extensive coronary artery disease (prior percutaneous coronary interventions to the left main, left anterior descending artery, first diagonal branch and right coronary artery) and paroxysmal atrial fibrillation. He presents today for follow-up after a catheter ablation of atrial fibrillation on 2023.   To briefly summarize the patient's history, he presented to the Emergency Department at [facility] on 2023 with symptomatic atrial fibrillation. He spontaneously converted to sinus rhythm during the hospitalization. Given his recent coronary intervention (cutting balloon angioplasty and POBA to RCA) in the setting of an ST elevation myocardial infarction, the patient's symptomatic atrial fibrillation was treated with Amiodarone. His ablation on 2023 involved pulmonary vein isolation and cavotricuspid isthmus ablation. Amiodarone was stopped after the ablation.   Since the ablation he reports that he has been feeling well. He states he feels "lighter" and has more energy. He confirms that he has not been taking Amiodarone. No chest pain, shortness of breath, dizziness, groin pain, pain with swallowing. He is taking Aspirin, Plavix and Eliquis. His last coronary intervention was in 2023 in the setting of a ST elevation myocardial infarction. Prior to the ablation his Apple watch was notifying him of atrial fibrillation. Since the ablation he has not gotten any notifications from this watch of atrial fibrillation. He has not yet seen sleep medicine to discuss an oral appliance (his father-in-law recently  and he has undergone cataract surgery).  CHADS2-VASC: 3 (Age: 1, HTN:1, CAD: 1)

## 2024-01-05 NOTE — DISCUSSION/SUMMARY
[FreeTextEntry1] : Mr. Kyle Contreras is a 68-year-old man with hypertension, hyperlipidemia, obstructive sleep apnea (not adherent to CPAP), extensive coronary artery disease (prior percutaneous coronary interventions to the left main, left anterior descending artery, first diagonal branch and right coronary artery) and paroxysmal atrial fibrillation. He presents today for follow-up after a catheter ablation of atrial fibrillation on November 15th, 2023. He is doing well following the ablation without a recurrence based on symptoms or his Apple watch.  We discussed his CHADS2-VASC score of 3. I recommended that he continue anticoagulation. As his last coronary intervention was in July of 2023 in the setting of a ST elevation myocardial infarction, he is also maintained on dual antiplatelet therapy. I suggested that he stop his aspirin in July of 2024 after speaking with Sameera Colón and Bobby. The patient is tolerating triple therapy presently. In the event he develops bleeding while on this regimen, we discussed the option of left atrial appendage occlusion. We also discussed the mechanisms of arrhythmia recurrence in patients with atrial fibrillation following ablation and risk factor modification. He will see me again in 6-months or sooner as needed.  [EKG obtained to assist in diagnosis and management of assessed problem(s)] : EKG obtained to assist in diagnosis and management of assessed problem(s)

## 2024-01-15 ENCOUNTER — TRANSCRIPTION ENCOUNTER (OUTPATIENT)
Age: 69
End: 2024-01-15

## 2024-01-15 NOTE — DISCUSSION/SUMMARY
[FreeTextEntry1] : ASHD-clinically stable.  Continue current regimen History of atrial fibrillation-continue Eliquis for now.  Status post ablation. Cataract-The patient is medically cleared for the proposed procedure with no special precautions.  Anticoagulation and antiplatelet therapy should be continued in the perioperative period.  Addendum 1/15/2024: Cataract-The patient is medically cleared for the proposed procedure with no special precautions.

## 2024-01-15 NOTE — HISTORY OF PRESENT ILLNESS
[FreeTextEntry1] : The patient is a 68-year-old gentleman has been followed by my office for many years.  He has a history of coronary artery disease and atrial fibrillation.  He is status post multiple stents between 2007 and 2012. In 2019, he developed recurrent chest discomfort.  He underwent PCI to mRCA lesion and POBA PCI of  of pLCFx. On July 22, 2023, he suffered an RCA STEMO and underwent PTCA of an in stent stensosi of the RCA.  He has been doing well since then.  Earlier that day, the patient had an episode of symptomatic atrial fibrillation. He had several recurrences of atrial fibrillation and on November 17, he underwent AF ablation with excellent results. He has been feeling well since then. Amiodarone was discontinued but the patient is continue on Eliquis at this time. We discussed the fact that the patient has restenosed his stents. After he finishes a year of Plavix and aspirin, patient will be started on long-term Xarelto.  He is now scheduled for elective cataract surgery.

## 2024-01-15 NOTE — PHYSICAL EXAM
[TextEntry] : From December 12, 2023 visit General/Constitutional: WD/ WN in NAD H: NC/AT Eyes:  PERRL, sclerae and conjunctivae normal without jaundice or xanthelasma; ENMT:normal teeth, gums and palate with no petechiae, pallor or cyanosis Neck: w/o JVD, thyromegaly or adenopathy; normal venous contour Respiratory: clear to auscultation, normal respiratory effort with no retractions or use of accessory respiratory muscles Heart: [id number], regular rate, normal S1, S2 without murmurs, rubs, gallops, heaves or thrills Vascular exam: normal carotid upstrokes without carotid or abdominal bruits. 2+/2+ pulses to posterior tibialis and dorsalis pedis Abdomen: soft, nontender, bowels sounds normal without hepatomegaly or splenomegaly, masses or bruits Musculoskeletal:without significant kyphosis or scoliosis Extremities: w/o CCE, good capillary filling Skin: no stasis changes; no ulcers  Neuro: AA and O x 3; no focal neurologic deficits Psych: normal mood and affect

## 2024-01-16 ENCOUNTER — TRANSCRIPTION ENCOUNTER (OUTPATIENT)
Age: 69
End: 2024-01-16

## 2024-01-18 NOTE — H&P CARDIOLOGY - EJECTION FRACTION (%)
[de-identified] : The patient is a well appearing 80 year  old male of their stated age. Patient ambulates with a normal gait. Negative straight leg raise bilateral   Effected Knee: RIGHT                         ROM:   degrees WITH CLICK  Lachman: Negative Pivot Shift: Negative Anterior Drawer: Negative Posterior Drawer / Sag: Negative Varus Stress 0 degrees: Stable Varus Stress 30 degrees: Stable Valgus Stress 0 degrees: Stable Valgus Stress 30 degrees: Stable Medial Eric: POSITIVE  Lateral Eric: Negative Patella Glide: 2+ Patella Apprehension: Negative Patella Grind: POSITIVE    Palpation: Medial Joint Line: TENDER  Lateral Joint Line: Nontender Medial Collateral Ligament: Nontender Lateral Collateral Ligament/PLC: Nontender Distal Femur: Nontender Proximal Tibia: Nontender Tibial Tubercle: Nontender Distal Pole Patella: Nontender Quadriceps Tendon: Nontender &  Intact Patella Tendon: Nontender &  Intact Medial Femoral Condyle: Nontender Medial Distal Hamstring/PES: Nontender Lateral Distal Hamstring: Nontender Vastus Medialis Oblique: Nontender  Iliotibial Band: Nontender & Intact Medial Patellofemoral Ligament: Nontender Adductor: Nontender Proximal GSC-Plantaris: Nontender Calf: Supple & Nontender   Inspection: Deformity: No Erythema: No Ecchymosis: No Abrasions: No Effusion: No Prepatella Bursitis: No Neurologic Exam: Sensation L4-S1: Grossly Intact Motor Exam: Quadriceps: 5 out of 5 Hamstrings: 5 out of 5 EHL: 5 out of 5 FHL: 5 out of 5 TA: 5 out of 5 GS: 5 out of 5 Circulatory/Pulses: Dorsalis Pedis: 2+ Posterior Tibialis: 2+ Additional Pertinent Findings: None   Contralateral Knee:                             ROM: 0-145 degrees Other Pertinent Findings: None   Assessment: The patient is a 80 year old male with right knee pain and radiographic and physical exam findings consistent with arthritis.   The patient's condition is acute Documents/Results Reviewed Today: None   Tests/Studies Independently Interpreted Today: None  Pertinent findings include:  with click, MJLT, +patellofemoral grind, +medial eric Confounding medical conditions/concerns: None   Plan: The patient presents s/p recent onset of knee pain/inflammation after receiving outside first Visco supplementation injections with Dr. Frankenberger. Advised the patient that he may be experiencing secondary synovitis and quad atrophy secondary to recent injection. We discussed all treatment options for the patients knee arthritis, both operative vs non-operative. The patient is aware and understands that if he fails conservative/injection management of arthritic pain, he should consider a total knee arthroplasty. Advised patient to continue use of Reparel sleeve. Continue physical therapy, HEP, and strengthening. He may return to Dr. Frankenberger for continued Visco injections. Continue use of previously prescribed Naprosyn for pain management - use as directed. The patient will follow up on a PRN basis unless new symptoms arise. Modify activity as discussed. Tests Ordered: None  Prescription Medications Ordered: Continue PRN use of outside prescribed Naproxen   Braces/DME Ordered: None  Activity/Work/Sports Status: As tolerated  Additional Instructions: None Follow-Up: PRN   The patient's current medication management of their orthopedic diagnosis was reviewed today: (1) The patient will continue with the PRN use of their prescribed anti-inflammatory. (2) There is a moderate risk of morbidity with further treatment, especially from use of prescription strength medications and possible side effects of these medications which include upset stomach with oral medications, skin reactions to topical medications and cardiac/renal issues with long term use. (3) I recommended that the patient follow-up with their medical physician to discuss any significant specific potential issues with long term medication use such as interactions with current medications or with exacerbation of underlying medical comorbidities. (4) The benefits and risks associated with use of injectable, oral or topical, prescription and over the counter anti-inflammatory medications were discussed with the patient. The patient voiced understanding of the risks including but not limited to bleeding, stroke, kidney dysfunction, heart disease, and were referred to the black box warning label for further information.   Graciela VALDEZ attest that this documentation has been prepared under the direction and in the presence of Provider Dr. Giovanny Sterling.   The documentation recorded by the scribe accurately reflects the services IDr. Giovanny, personally performed and the decisions made by me.
52

## 2024-02-03 ENCOUNTER — RX RENEWAL (OUTPATIENT)
Age: 69
End: 2024-02-03

## 2024-02-03 RX ORDER — APIXABAN 5 MG/1
5 TABLET, FILM COATED ORAL TWICE DAILY
Qty: 180 | Refills: 3 | Status: ACTIVE | COMMUNITY
Start: 2023-07-25 | End: 1900-01-01

## 2024-04-23 NOTE — PATIENT PROFILE ADULT - DO YOU LACK THE NECESSARY SUPPORT TO HELP YOU COPE WITH LIFE CHALLENGES?
Cardiology Progress Note    Assessment/Plan:    Dyspnea on exertion, fatigue.  No significant increase in BNP.  Troponins normal.  Overnight oximetry essentially normal.  Suspect physical deconditioning.  Encouraged daily walking, or stationary bike riding targeting 20 minutes each day.  Mild aortic stenosis.  Unlikely to be contributing to her symptom complex.  Would favor recheck echo in 2 years.  Urinary retention, uncertain etiology.    Cardiology will sign off.  Please call if we can be of further assistance.    Active Problems:    Hypoxemia    Acute on chronic congestive heart failure, unspecified heart failure type (H)     LOS: 0 days     Subjective:  No shortness of breath.  Concerned about urinary difficulties      Objective:   Vital signs in last 24 hours:  Vitals:    04/22/24 2353 04/23/24 0346 04/23/24 0508 04/23/24 0758   BP: 112/68 112/53  104/50   BP Location: Left arm Left arm  Left arm   Patient Position:  Semi-Vasquez's     Cuff Size:  Adult Regular     Pulse: 68   83   Resp: 20 20  20   Temp: 97.3  F (36.3  C) 97.2  F (36.2  C)  97.4  F (36.3  C)   TempSrc: Oral Axillary  Oral   SpO2: 94%   94%   Weight:   85.5 kg (188 lb 9.6 oz)    Height:         Weight:   Wt Readings from Last 3 Encounters:   04/23/24 85.5 kg (188 lb 9.6 oz)   02/29/24 85.7 kg (189 lb)   02/26/24 85.7 kg (189 lb)           PHYSICAL EXAM      Respiratory:  Normal breath sounds, No respiratory distress, No wheezing, No chest tenderness.   Cardiovascular:   Normal heart rate, Normal rhythm, no murmur.  No rubs, No gallops.   GI:  Bowel sounds normal, Soft, No tenderness, No masses  Extremities: no edema       Cardiographics:   Telemetry sinus rhythm, 65 to 80 bpm.    ECG (personally reviewed) 4/21/2024: Sinus rhythm 85 bpm. Low voltage QRS. Otherwise normal ECG no change versus prior.     Overnight oximetry showed no oxygen desaturation.    Echocardiogram 4/22:  1. Left ventricular chamber size, wall thickness and systolic function  are  normal. The visually estimated left ventricular ejection fraction is 65-70%.  2. Right ventricular chamber size and systolic function are normal.  3. Calcified trileaflet aortic valve with mild aortic stenosis. Peak forward  velocity measures 2.2 m/s, mean outflow gradient 11 mmHg, calculated aortic valve area 1.5 cm2 and dimensionless index 0.47. No significant aortic regurgitation.  4. Compared to the prior study dated 3/31/2022, mild aortic stenosis is now  present.        Lab Results:   Lab Results   Component Value Date    WBC 7.8 04/22/2024    HGB 9.8 (L) 04/22/2024    HCT 29.5 (L) 04/22/2024     04/22/2024    CHOL 187 03/22/2024    TRIG 156 (H) 03/22/2024    HDL 63 03/22/2024    ALT <5 04/22/2024    AST 20 04/22/2024     04/23/2024    BUN 40.7 (H) 04/23/2024    CO2 26 04/23/2024    TSH 3.05 03/22/2024    INR 1.27 (H) 04/21/2024    MICROALBUR 2.41 (H) 01/31/2022     Lab Results   Component Value Date    TROPONINI <0.01 02/05/2022     troponins      Phill Rosario MD [facility]  4/23/2024      no

## 2024-06-14 ENCOUNTER — NON-APPOINTMENT (OUTPATIENT)
Age: 69
End: 2024-06-14

## 2024-06-14 ENCOUNTER — APPOINTMENT (OUTPATIENT)
Dept: CARDIOLOGY | Facility: CLINIC | Age: 69
End: 2024-06-14
Payer: COMMERCIAL

## 2024-06-14 DIAGNOSIS — E78.5 HYPERLIPIDEMIA, UNSPECIFIED: ICD-10-CM

## 2024-06-14 DIAGNOSIS — I10 ESSENTIAL (PRIMARY) HYPERTENSION: ICD-10-CM

## 2024-06-14 DIAGNOSIS — I20.9 ANGINA PECTORIS, UNSPECIFIED: ICD-10-CM

## 2024-06-14 DIAGNOSIS — I25.118 ATHEROSCLEROTIC HEART DISEASE OF NATIVE CORONARY ARTERY WITH OTHER FORMS OF ANGINA PECTORIS: ICD-10-CM

## 2024-06-14 DIAGNOSIS — I48.91 UNSPECIFIED ATRIAL FIBRILLATION: ICD-10-CM

## 2024-06-14 DIAGNOSIS — Z00.00 ENCOUNTER FOR GENERAL ADULT MEDICAL EXAMINATION W/OUT ABNORMAL FINDINGS: ICD-10-CM

## 2024-06-14 PROCEDURE — 99442: CPT | Mod: 93

## 2024-06-14 PROCEDURE — G2211 COMPLEX E/M VISIT ADD ON: CPT | Mod: NC

## 2024-06-14 NOTE — REASON FOR VISIT
[FreeTextEntry1] : The patient has been scheduled for a routine office visit but yesterday he developed fever to 102 with viral symptoms.  He checked for COVID which was negative but he continues to have symptoms.  He is using Tylenol which seems to control his symptoms.  I indicated to the patient that I often found the home test for COVID to have a low specificity and recommended that if the symptoms continued or worsened he get a PCR.  However at this time, the symptoms seem to be fairly mild.  The patient wanted to discuss several issues at the visit.  For the past few months, he has noted a throbbing in his right ear which occurs primarily when he is lying down at night but can occur regardless of which side he is lying on the throbbing is somewhat irregular (the patient does have a history of atrial fibrillation although he was not normal sinus rhythm at the time of his last visit in January.)  The patient will use his Apple Watch to obtain an EKG and will send a copy of the EKG to me to determine whether he is back in atrial fibrillation.  In either case, the patient is on Eliquis.  He also wants to discuss an MRI and MRA performed on his neck in August 2023 which demonstrated moderate ischemic white matter changes.  The patient does state that he has had some issues with memory recently.  I indicated to the patient that the MRA failed to demonstrate any significant carotid disease and he is already taking aspirin and Eliquis.  I will refer the MRI findings to a neurology colleague to determine whether any further workup is necessary.  The patient also states that for the past 2 weeks he has noted some increased exertional shortness of breath after about a block to a block and a half of walking which is somewhat similar to symptoms he had prior to his stents.  Patient will be scheduled for a stress test.  I also recommended that he increase his metoprolol to 75 mg daily to see whether that controls his symptoms somewhat.

## 2024-06-14 NOTE — DISCUSSION/SUMMARY
[FreeTextEntry1] : paroxysmal atrial fibrillation:continue  Eliquis.  Apple Watch EKG will be forwarded to me. ASHD: As above.  Recurrent exertional shortness of breath over the last 2 weeks.  Stress test will be scheduled. MRI with some ischemic changes-will discuss with Dr. Headley. Fever-recall as needed. Further recommendations pending results of stress test.  Total time of the encounter: 30 minutes which included but was not limited to the following: Face-to-face and non face-to-face time personally spent by the physician preparing to see the patient, obtaining and/or resuming separately obtained history, performing a medically appropriate examination and/or evaluation, counseling and educating the patient/family/caregiver, ordering medications, tests or procedures, referring and communicating with other healthcare professionals, documenting clinical information in the electronic health record, independently interpreting results and communicated results to the patient/family/caregiver and care coordination.

## 2024-06-16 ENCOUNTER — NON-APPOINTMENT (OUTPATIENT)
Age: 69
End: 2024-06-16

## 2024-06-18 ENCOUNTER — RX RENEWAL (OUTPATIENT)
Age: 69
End: 2024-06-18

## 2024-06-18 RX ORDER — CLOPIDOGREL BISULFATE 75 MG/1
75 TABLET, FILM COATED ORAL DAILY
Qty: 90 | Refills: 1 | Status: DISCONTINUED | COMMUNITY
Start: 2023-07-25 | End: 2024-06-18

## 2024-06-18 RX ORDER — PRASUGREL 10 MG/1
10 TABLET, FILM COATED ORAL
Qty: 90 | Refills: 3 | Status: DISCONTINUED | COMMUNITY
Start: 2023-05-17 | End: 2024-06-18

## 2024-06-18 RX ORDER — ASPIRIN ENTERIC COATED TABLETS 81 MG 81 MG/1
81 TABLET, DELAYED RELEASE ORAL DAILY
Refills: 0 | Status: ACTIVE | COMMUNITY
Start: 2024-01-05

## 2024-06-18 RX ORDER — METOPROLOL SUCCINATE 50 MG/1
50 TABLET, EXTENDED RELEASE ORAL DAILY
Qty: 90 | Refills: 3 | Status: ACTIVE | COMMUNITY
Start: 2023-09-11 | End: 1900-01-01

## 2024-07-03 ENCOUNTER — APPOINTMENT (OUTPATIENT)
Dept: CARDIOLOGY | Facility: CLINIC | Age: 69
End: 2024-07-03

## 2024-07-03 ENCOUNTER — APPOINTMENT (OUTPATIENT)
Dept: CARDIOLOGY | Facility: CLINIC | Age: 69
End: 2024-07-03
Payer: COMMERCIAL

## 2024-07-03 DIAGNOSIS — I20.9 ANGINA PECTORIS, UNSPECIFIED: ICD-10-CM

## 2024-07-03 DIAGNOSIS — I10 ESSENTIAL (PRIMARY) HYPERTENSION: ICD-10-CM

## 2024-07-03 DIAGNOSIS — I25.118 ATHEROSCLEROTIC HEART DISEASE OF NATIVE CORONARY ARTERY WITH OTHER FORMS OF ANGINA PECTORIS: ICD-10-CM

## 2024-07-03 DIAGNOSIS — R07.2 PRECORDIAL PAIN: ICD-10-CM

## 2024-07-03 DIAGNOSIS — J45.909 UNSPECIFIED ASTHMA, UNCOMPLICATED: ICD-10-CM

## 2024-07-03 PROCEDURE — 99214 OFFICE O/P EST MOD 30 MIN: CPT

## 2024-07-03 PROCEDURE — G2211 COMPLEX E/M VISIT ADD ON: CPT | Mod: NC

## 2024-07-06 ENCOUNTER — NON-APPOINTMENT (OUTPATIENT)
Age: 69
End: 2024-07-06

## 2024-07-06 PROBLEM — Z86.79 HISTORY OF ATRIAL FIBRILLATION: Status: RESOLVED | Noted: 2023-07-25 | Resolved: 2024-07-06

## 2024-07-08 ENCOUNTER — TRANSCRIPTION ENCOUNTER (OUTPATIENT)
Age: 69
End: 2024-07-08

## 2024-07-08 DIAGNOSIS — U07.1 COVID-19: ICD-10-CM

## 2024-07-08 RX ORDER — NIRMATRELVIR AND RITONAVIR 300-100 MG
20 X 150 MG & KIT ORAL
Qty: 1 | Refills: 0 | Status: ACTIVE | COMMUNITY
Start: 2024-07-08 | End: 1900-01-01

## 2024-07-12 ENCOUNTER — APPOINTMENT (OUTPATIENT)
Dept: CV DIAGNOSTICS | Facility: HOSPITAL | Age: 69
End: 2024-07-12

## 2024-07-12 ENCOUNTER — APPOINTMENT (OUTPATIENT)
Dept: ELECTROPHYSIOLOGY | Facility: CLINIC | Age: 69
End: 2024-07-12

## 2024-07-12 DIAGNOSIS — Z86.79 OTHER SPECIFIED POSTPROCEDURAL STATES: ICD-10-CM

## 2024-07-12 DIAGNOSIS — I48.0 PAROXYSMAL ATRIAL FIBRILLATION: ICD-10-CM

## 2024-07-12 DIAGNOSIS — Z86.79 PERSONAL HISTORY OF OTHER DISEASES OF THE CIRCULATORY SYSTEM: ICD-10-CM

## 2024-07-12 DIAGNOSIS — Z98.890 OTHER SPECIFIED POSTPROCEDURAL STATES: ICD-10-CM

## 2024-07-17 RX ORDER — BENZONATATE 200 MG/1
200 CAPSULE ORAL 3 TIMES DAILY
Qty: 40 | Refills: 2 | Status: ACTIVE | COMMUNITY
Start: 2024-07-17 | End: 1900-01-01

## 2024-07-29 ENCOUNTER — NON-APPOINTMENT (OUTPATIENT)
Age: 69
End: 2024-07-29

## 2024-07-31 ENCOUNTER — NON-APPOINTMENT (OUTPATIENT)
Age: 69
End: 2024-07-31

## 2024-08-01 ENCOUNTER — TRANSCRIPTION ENCOUNTER (OUTPATIENT)
Age: 69
End: 2024-08-01

## 2024-08-01 ENCOUNTER — OUTPATIENT (OUTPATIENT)
Dept: OUTPATIENT SERVICES | Facility: HOSPITAL | Age: 69
LOS: 1 days | End: 2024-08-01
Payer: COMMERCIAL

## 2024-08-01 VITALS
OXYGEN SATURATION: 95 % | RESPIRATION RATE: 16 BRPM | HEART RATE: 102 BPM | TEMPERATURE: 98 F | DIASTOLIC BLOOD PRESSURE: 84 MMHG | SYSTOLIC BLOOD PRESSURE: 133 MMHG

## 2024-08-01 VITALS — RESPIRATION RATE: 16 BRPM | WEIGHT: 214.95 LBS | HEIGHT: 70 IN

## 2024-08-01 DIAGNOSIS — C43.9 MALIGNANT MELANOMA OF SKIN, UNSPECIFIED: Chronic | ICD-10-CM

## 2024-08-01 DIAGNOSIS — Z95.5 PRESENCE OF CORONARY ANGIOPLASTY IMPLANT AND GRAFT: Chronic | ICD-10-CM

## 2024-08-01 DIAGNOSIS — R07.89 OTHER CHEST PAIN: ICD-10-CM

## 2024-08-01 LAB
ANION GAP SERPL CALC-SCNC: 16 MMOL/L — SIGNIFICANT CHANGE UP (ref 5–17)
BLD GP AB SCN SERPL QL: NEGATIVE — SIGNIFICANT CHANGE UP
BUN SERPL-MCNC: 17 MG/DL — SIGNIFICANT CHANGE UP (ref 7–23)
CALCIUM SERPL-MCNC: 10.1 MG/DL — SIGNIFICANT CHANGE UP (ref 8.4–10.5)
CHLORIDE SERPL-SCNC: 101 MMOL/L — SIGNIFICANT CHANGE UP (ref 96–108)
CO2 SERPL-SCNC: 21 MMOL/L — LOW (ref 22–31)
CREAT SERPL-MCNC: 0.92 MG/DL — SIGNIFICANT CHANGE UP (ref 0.5–1.3)
EGFR: 91 ML/MIN/1.73M2 — SIGNIFICANT CHANGE UP
GLUCOSE SERPL-MCNC: 171 MG/DL — HIGH (ref 70–99)
HCT VFR BLD CALC: 45.7 % — SIGNIFICANT CHANGE UP (ref 39–50)
HGB BLD-MCNC: 16.1 G/DL — SIGNIFICANT CHANGE UP (ref 13–17)
MCHC RBC-ENTMCNC: 30.9 PG — SIGNIFICANT CHANGE UP (ref 27–34)
MCHC RBC-ENTMCNC: 35.2 GM/DL — SIGNIFICANT CHANGE UP (ref 32–36)
MCV RBC AUTO: 87.7 FL — SIGNIFICANT CHANGE UP (ref 80–100)
NRBC # BLD: 0 /100 WBCS — SIGNIFICANT CHANGE UP (ref 0–0)
PLATELET # BLD AUTO: 253 K/UL — SIGNIFICANT CHANGE UP (ref 150–400)
POTASSIUM SERPL-MCNC: 3.8 MMOL/L — SIGNIFICANT CHANGE UP (ref 3.5–5.3)
POTASSIUM SERPL-SCNC: 3.8 MMOL/L — SIGNIFICANT CHANGE UP (ref 3.5–5.3)
RBC # BLD: 5.21 M/UL — SIGNIFICANT CHANGE UP (ref 4.2–5.8)
RBC # FLD: 13 % — SIGNIFICANT CHANGE UP (ref 10.3–14.5)
RH IG SCN BLD-IMP: POSITIVE — SIGNIFICANT CHANGE UP
RH IG SCN BLD-IMP: POSITIVE — SIGNIFICANT CHANGE UP
SODIUM SERPL-SCNC: 138 MMOL/L — SIGNIFICANT CHANGE UP (ref 135–145)
WBC # BLD: 7.38 K/UL — SIGNIFICANT CHANGE UP (ref 3.8–10.5)
WBC # FLD AUTO: 7.38 K/UL — SIGNIFICANT CHANGE UP (ref 3.8–10.5)

## 2024-08-01 PROCEDURE — 99152 MOD SED SAME PHYS/QHP 5/>YRS: CPT

## 2024-08-01 PROCEDURE — 86901 BLOOD TYPING SEROLOGIC RH(D): CPT

## 2024-08-01 PROCEDURE — 93010 ELECTROCARDIOGRAM REPORT: CPT

## 2024-08-01 PROCEDURE — 93005 ELECTROCARDIOGRAM TRACING: CPT

## 2024-08-01 PROCEDURE — 85027 COMPLETE CBC AUTOMATED: CPT

## 2024-08-01 PROCEDURE — 92972 PERQ TRLUML CORONRY LITHOTRP: CPT

## 2024-08-01 PROCEDURE — C1887: CPT

## 2024-08-01 PROCEDURE — C1753: CPT

## 2024-08-01 PROCEDURE — 92978 ENDOLUMINL IVUS OCT C 1ST: CPT | Mod: RC

## 2024-08-01 PROCEDURE — 80048 BASIC METABOLIC PNL TOTAL CA: CPT

## 2024-08-01 PROCEDURE — 92978 ENDOLUMINL IVUS OCT C 1ST: CPT | Mod: 26,RC

## 2024-08-01 PROCEDURE — C1769: CPT

## 2024-08-01 PROCEDURE — 92920 PRQ TRLUML C ANGIOP 1ART&/BR: CPT | Mod: RC

## 2024-08-01 PROCEDURE — 86900 BLOOD TYPING SEROLOGIC ABO: CPT

## 2024-08-01 PROCEDURE — 86850 RBC ANTIBODY SCREEN: CPT

## 2024-08-01 PROCEDURE — 93454 CORONARY ARTERY ANGIO S&I: CPT | Mod: 59

## 2024-08-01 PROCEDURE — C1725: CPT

## 2024-08-01 PROCEDURE — 93454 CORONARY ARTERY ANGIO S&I: CPT | Mod: 26,59

## 2024-08-01 PROCEDURE — C1894: CPT

## 2024-08-01 PROCEDURE — C1761: CPT

## 2024-08-01 RX ORDER — CLOPIDOGREL BISULFATE 75 MG/1
1 TABLET, FILM COATED ORAL
Refills: 0 | DISCHARGE

## 2024-08-01 RX ORDER — CLOPIDOGREL BISULFATE 75 MG/1
1 TABLET, FILM COATED ORAL
Qty: 90 | Refills: 3
Start: 2024-08-01 | End: 2025-07-26

## 2024-08-01 RX ORDER — METOPROLOL TARTRATE 100 MG
25 TABLET ORAL DAILY
Refills: 0 | Status: ACTIVE | OUTPATIENT
Start: 2024-08-01 | End: 2025-06-30

## 2024-08-01 RX ORDER — VALSARTAN 40 MG/1
320 TABLET, FILM COATED ORAL DAILY
Refills: 0 | Status: ACTIVE | OUTPATIENT
Start: 2024-08-01 | End: 2025-06-30

## 2024-08-01 RX ORDER — ACETAMINOPHEN 500 MG
1000 TABLET ORAL ONCE
Refills: 0 | Status: COMPLETED | OUTPATIENT
Start: 2024-08-01 | End: 2024-08-01

## 2024-08-01 RX ORDER — ASPIRIN 500 MG
81 TABLET ORAL DAILY
Refills: 0 | Status: ACTIVE | OUTPATIENT
Start: 2024-08-02 | End: 2025-07-01

## 2024-08-01 RX ORDER — CLOPIDOGREL BISULFATE 75 MG/1
75 TABLET, FILM COATED ORAL DAILY
Refills: 0 | Status: ACTIVE | OUTPATIENT
Start: 2024-08-02 | End: 2025-07-01

## 2024-08-01 RX ORDER — FINASTERIDE 5 MG/1
5 TABLET, FILM COATED ORAL DAILY
Refills: 0 | Status: ACTIVE | OUTPATIENT
Start: 2024-08-01 | End: 2025-06-30

## 2024-08-01 RX ORDER — ATORVASTATIN CALCIUM 40 MG/1
80 TABLET, FILM COATED ORAL AT BEDTIME
Refills: 0 | Status: ACTIVE | OUTPATIENT
Start: 2024-08-01 | End: 2025-06-30

## 2024-08-01 RX ORDER — DIPHENHYDRAMINE HCL 25 MG
50 CAPSULE ORAL ONCE
Refills: 0 | Status: COMPLETED | OUTPATIENT
Start: 2024-08-01 | End: 2024-08-01

## 2024-08-01 RX ORDER — BACTERIOSTATIC SODIUM CHLORIDE 0.9 %
1000 VIAL (ML) INJECTION
Refills: 0 | Status: ACTIVE | OUTPATIENT
Start: 2024-08-01 | End: 2024-08-01

## 2024-08-01 RX ORDER — BACTERIOSTATIC SODIUM CHLORIDE 0.9 %
250 VIAL (ML) INJECTION ONCE
Refills: 0 | Status: COMPLETED | OUTPATIENT
Start: 2024-08-01 | End: 2024-08-01

## 2024-08-01 RX ORDER — METOPROLOL TARTRATE 100 MG
50 TABLET ORAL DAILY
Refills: 0 | Status: ACTIVE | OUTPATIENT
Start: 2024-08-01 | End: 2025-06-30

## 2024-08-01 RX ORDER — METOPROLOL TARTRATE 100 MG
1 TABLET ORAL
Refills: 0 | DISCHARGE

## 2024-08-01 RX ADMIN — Medication 25 MILLIGRAM(S): at 16:04

## 2024-08-01 RX ADMIN — Medication 100 MILLILITER(S): at 14:10

## 2024-08-01 RX ADMIN — Medication 50 MILLIGRAM(S): at 07:53

## 2024-08-01 RX ADMIN — Medication 75 MILLILITER(S): at 06:44

## 2024-08-01 RX ADMIN — Medication 400 MILLIGRAM(S): at 16:51

## 2024-08-01 RX ADMIN — Medication 750 MILLILITER(S): at 06:44

## 2024-08-01 NOTE — CHART NOTE - NSCHARTNOTEFT_GEN_A_CORE
Cardiac Rehab (STEMI/NSTEMI/ACS/Unstable Angina/CHF/Chronic Stable Angina/Heart Surgery (CABG,Valve)/Post PCI):              *Education on benefits of Cardiac Rehab provided to patient: Yes         *Referral and Prescription Given for Cardiac Rehab : Yes         *Pt given list of locations & instructed to contact their insurance company to review list of participating providers         *Pt instructed to bring Cardiac Rehab prescription with them to Cardiology Follow up appointment for assistance with enrollment: Yes         *Pt discharged with copies detail cardiovascular history, medications, testing/treatments

## 2024-08-01 NOTE — ASU DISCHARGE PLAN (ADULT/PEDIATRIC) - CARE PROVIDER_API CALL
Anish Colón  Cardiovascular Disease  [address], [address]  [place] [address]  Phone: [phone]  Fax: [phone]  Established Patient  Follow Up Time: 2 weeks

## 2024-08-01 NOTE — ASU DISCHARGE PLAN (ADULT/PEDIATRIC) - CALL YOUR DOCTOR IF YOU HAVE ANY OF THE FOLLOWING:
Bleeding that does not stop/Swelling that gets worse/Pain not relieved by Medications/Fever greater than (need to indicate Fahrenheit or Celsius)/Wound/Surgical Site with redness, or foul smelling discharge or pus No previous uterine incision/Live Pregnancy/Less than or equal to 4 previous vaginal births/No known bleeding disorder/No history of postpartum hemorrhage/No other PPH risks indicated

## 2024-08-01 NOTE — H&P CARDIOLOGY - NSICDXPASTMEDICALHX_GEN_ALL_CORE_FT
PAST MEDICAL HISTORY:  Ankle fracture     Asthma     Atrial fibrillation On Xarelto    CAD (coronary atherosclerotic disease)     H/O heart artery stent 2008, 2012    HTN (hypertension)     Hypercholesteremia     Melanoma     MI (myocardial infarction) 2008

## 2024-08-01 NOTE — H&P CARDIOLOGY - HISTORY OF PRESENT ILLNESS
This is a 68 year old male with past medical history of HTN, HLD, CAD s/p stents x 10  (last stent in July 2023) with most recent POBA to RCA in July, h/o  PCI,  Afib on Eliquis, s/p AFIB ablation on 11/15/23 with Dr. Olson presents today for  PCI with Dr. Esteves. Patient reports that he has been having ongoing difficulties with exertional shortness of breath which seems to be getting progressively worse. He states that after about a block of walking now he has to stop and rest. He also describes episodes of across the chest tightness which occur when he eats a big meal. The symptoms are clearly worsening over the last few weeks.  Currently, denies any lightheadedness, dizziness, CP, palpitation or SOB. Denies any implanted cardiac devices This is a 68 year old male with past medical history of HTN, HLD, CAD s/p stents x 10  (last stent in July 2023) with most recent POBA to RCA in July, h/o  PCI,  Afib on Eliquis, s/p AFIB ablation on 11/15/23 with Dr. Olson presents today for  PCI with Dr. Esteves. Patient reports that he has been having ongoing difficulties with exertional shortness of breath which seems to be getting progressively worse. He states that after about a block of walking now he has to stop and rest. He also describes episodes of across the chest tightness which occur when he eats a big meal. The symptoms are clearly worsening over the last few weeks.  Currently, denies any lightheadedness, dizziness, CP, palpitation or SOB. Denies any implanted cardiac devices.  Card: Dr. Katarzyna Oconnell

## 2024-08-04 NOTE — HISTORY OF PRESENT ILLNESS
[FreeTextEntry1] : Mr. Kyle Contreras is a 68-year-old man with hypertension, hyperlipidemia, obstructive sleep apnea (not adherent to CPAP), extensive coronary artery disease (with prior percutaneous coronary interventions to the left main, left anterior descending artery, first diagonal branch and right coronary artery) and paroxysmal atrial fibrillation. He presents today for follow-up after a catheter ablation of atrial fibrillation on November 15th, 2023.   To briefly summarize the patient's history, he presented to the Emergency Department at [facility] on July 26th, 2023 with symptomatic atrial fibrillation. He spontaneously converted to sinus rhythm during the hospitalization. Given his recent coronary intervention (cutting balloon angioplasty and POBA to RCA) in the setting of an ST elevation myocardial infarction, he was treated with Amiodarone. His ablation on November 15th, 2023 involved pulmonary vein isolation and cavotricuspid isthmus ablation. Amiodarone was stopped after the ablation.   Since his last visit with me in January of 2024 he has been feeling ____.  Apple watch notifications? Has he seen sleep medicine?  CHADS2-VASC: 3 (Age: 1, HTN:1, CAD: 1)

## 2024-08-04 NOTE — CARDIOLOGY SUMMARY
[de-identified] : ECG from 8/9/2024:  ECG from 1/5/2024: Sinus rhythm at 60bpm, inferior q waves ECG from 9/29/2023: Sinus rhythm at 65bpm, inferior q waves ECG from 7/26/2023: SR at 71bpm ECG from 7/25/2023: AF at 154bpm ECG from 7/22/2023: AF at 124bpm with IWSTEMI ECG from 7/12/2023: AF at 112bpm [de-identified] : TTE from 7/22/2023: EF: 47%, regional WMAs, basal and mid inferior wall as well as basal and mid inferolateral walls are abnormal, grade 1 diastolic dysfunction, normal RV size with reduced RVSF, trace TR, no AI, no pericardial effusion, normal LA size (LA not well visualized) [de-identified] : Ablation from 11/15/2023: CTI, normal LA voltage, exit mapping used for LPVs - brad, first pass of R PVs, acute reconnection of LSPV - roof, dormant RIPV - brad, confirmed no dormant of RIPV following additional ablation [de-identified] : [facility] from 7/22/2023 (Dr. Ralph Rosales): STEMI of the right coronary artery, multiple layers of stent in region of the acutely occluded RCA, mild to moderate in-stent restenosis of the left main, left anterior descending and first diagonal artery stents, right dominant system, LVEDP = 18mmHg

## 2024-08-07 PROBLEM — I25.10 ATHEROSCLEROTIC HEART DISEASE OF NATIVE CORONARY ARTERY WITHOUT ANGINA PECTORIS: Chronic | Status: ACTIVE | Noted: 2024-08-01

## 2024-08-09 ENCOUNTER — APPOINTMENT (OUTPATIENT)
Dept: ELECTROPHYSIOLOGY | Facility: CLINIC | Age: 69
End: 2024-08-09

## 2024-08-14 RX ORDER — METOPROLOL SUCCINATE 25 MG/1
25 TABLET, EXTENDED RELEASE ORAL
Qty: 90 | Refills: 3 | Status: ACTIVE | COMMUNITY
Start: 2024-08-14 | End: 1900-01-01

## 2024-08-27 ENCOUNTER — NON-APPOINTMENT (OUTPATIENT)
Age: 69
End: 2024-08-27

## 2024-08-27 ENCOUNTER — APPOINTMENT (OUTPATIENT)
Dept: CARDIOLOGY | Facility: CLINIC | Age: 69
End: 2024-08-27

## 2024-08-27 NOTE — REVIEW OF SYSTEMS
[Negative] : Psychiatric [Vision Problems] : no vision problems [Hearing Loss] : no hearing loss [Chest Pain] : no chest pain [Palpitations] : no palpitations [Claudication] : no  leg claudication [Lower Ext Edema] : no lower extremity edema [Paroxysmal Nocturnal Dyspnea] : no paroxysmal nocturnal dyspnea [Shortness Of Breath] : no shortness of breath [Wheezing] : no wheezing [Cough] : no cough [Dyspnea on Exertion] : not dyspnea on exertion [Abdominal Pain] : no abdominal pain [Heartburn] : no heartburn [Melena] : no melena [Joint Pain] : no joint pain [Joint Stiffness] : no joint stiffness [Muscle Pain] : no muscle pain [Muscle Weakness] : no muscle weakness [Back Pain] : no back pain

## 2024-08-27 NOTE — HISTORY OF PRESENT ILLNESS
[Fully functional (bathing, dressing, toileting, transferring, walking, feeding)] : Fully functional (bathing, dressing, toileting, transferring, walking, feeding) [FreeTextEntry1] : JOVANI VIVEROS is a 68 year old male s/p PCI mRCA  with IV lithotripsy, POBA & thrombectomy. on 8/1/24; with history of multiple stents (9); HTN, HLD, Afib (s/p ablation 11/23 on Eliquis). He reports he is following a diet that includes lean protein, less carbs, more fruits & vegetables. He has slowly reduced his weight losing 18 lbs over 6 months, makes him feel much better.  He has increased his walking for exercise and is looking forward to cardiac rehab.

## 2024-08-27 NOTE — PLAN
[FreeTextEntry1] :  Cardiac Rehabilitation Phase 2 Focus assessment and physical exam at session #1 ITP initiated-to be finalized/established by Dr. Welsh prior to session #1   All questions answered. Welcome appointment information emailed to patient (permission from patient obtained). Start date: 2/10/25 M/Th 4:30 pm Appointment with Dr. Welsh: 2/10/25 4pm   Patient made aware that start date could possibly be moved up, as scheduling allows. Cardiac rehab phone number made available for additional questions or concerns.

## 2024-08-27 NOTE — REASON FOR VISIT
[Verbal consent obtained from patient] : the patient, [unfilled] [Home] : at home, [unfilled] , at the time of the visit. [Medical Office: ([facility])___] : at the medical office located in  [Intake Visit] : an intake visit

## 2024-09-23 NOTE — H&P CARDIOLOGY - RESPIRATORY AND THORAX
Comment: Much improved with triamcinolone. Continue to moisturizer and TAC as needed max 2 weeks at a time, avoiding the face.
Detail Level: Detailed
Render Risk Assessment In Note?: no
negative

## 2024-10-08 ENCOUNTER — APPOINTMENT (OUTPATIENT)
Dept: CARDIOLOGY | Facility: CLINIC | Age: 69
End: 2024-10-08
Payer: COMMERCIAL

## 2024-10-08 DIAGNOSIS — I10 ESSENTIAL (PRIMARY) HYPERTENSION: ICD-10-CM

## 2024-10-08 DIAGNOSIS — I25.118 ATHEROSCLEROTIC HEART DISEASE OF NATIVE CORONARY ARTERY WITH OTHER FORMS OF ANGINA PECTORIS: ICD-10-CM

## 2024-10-08 DIAGNOSIS — E78.5 HYPERLIPIDEMIA, UNSPECIFIED: ICD-10-CM

## 2024-10-08 PROCEDURE — 99214 OFFICE O/P EST MOD 30 MIN: CPT

## 2024-10-08 PROCEDURE — G2211 COMPLEX E/M VISIT ADD ON: CPT | Mod: NC

## 2024-10-08 RX ORDER — INCLISIRAN 284 MG/1.5ML
284 INJECTION, SOLUTION SUBCUTANEOUS
Qty: 1 | Refills: 0 | Status: ACTIVE | COMMUNITY
Start: 2024-10-08

## 2024-10-28 ENCOUNTER — NON-APPOINTMENT (OUTPATIENT)
Age: 69
End: 2024-10-28

## 2024-10-28 ENCOUNTER — APPOINTMENT (OUTPATIENT)
Dept: CARDIOLOGY | Facility: CLINIC | Age: 69
End: 2024-10-28

## 2024-10-28 VITALS
BODY MASS INDEX: 30.78 KG/M2 | HEIGHT: 70 IN | HEART RATE: 68 BPM | DIASTOLIC BLOOD PRESSURE: 70 MMHG | WEIGHT: 215 LBS | OXYGEN SATURATION: 96 % | SYSTOLIC BLOOD PRESSURE: 124 MMHG

## 2024-10-28 DIAGNOSIS — Z95.5 PRESENCE OF CORONARY ANGIOPLASTY IMPLANT AND GRAFT: ICD-10-CM

## 2024-10-28 DIAGNOSIS — E78.5 HYPERLIPIDEMIA, UNSPECIFIED: ICD-10-CM

## 2024-10-28 DIAGNOSIS — Z98.890 OTHER SPECIFIED POSTPROCEDURAL STATES: ICD-10-CM

## 2024-10-28 DIAGNOSIS — Z86.79 OTHER SPECIFIED POSTPROCEDURAL STATES: ICD-10-CM

## 2024-10-28 PROCEDURE — 99214 OFFICE O/P EST MOD 30 MIN: CPT | Mod: 25

## 2024-10-28 PROCEDURE — 93798 PHYS/QHP OP CAR RHAB W/ECG: CPT

## 2024-10-28 PROCEDURE — 93000 ELECTROCARDIOGRAM COMPLETE: CPT | Mod: 59

## 2024-10-31 ENCOUNTER — APPOINTMENT (OUTPATIENT)
Dept: CARDIOLOGY | Facility: CLINIC | Age: 69
End: 2024-10-31

## 2024-11-04 ENCOUNTER — APPOINTMENT (OUTPATIENT)
Dept: CARDIOLOGY | Facility: CLINIC | Age: 69
End: 2024-11-04

## 2024-11-04 ENCOUNTER — APPOINTMENT (OUTPATIENT)
Dept: CARDIOLOGY | Facility: CLINIC | Age: 69
End: 2024-11-04
Payer: COMMERCIAL

## 2024-11-04 PROCEDURE — 93797 PHYS/QHP OP CAR RHAB WO ECG: CPT

## 2024-11-06 ENCOUNTER — APPOINTMENT (OUTPATIENT)
Dept: CARDIOLOGY | Facility: CLINIC | Age: 69
End: 2024-11-06
Payer: COMMERCIAL

## 2024-11-06 PROCEDURE — 93797 PHYS/QHP OP CAR RHAB WO ECG: CPT

## 2024-11-07 ENCOUNTER — APPOINTMENT (OUTPATIENT)
Dept: CARDIOLOGY | Facility: CLINIC | Age: 69
End: 2024-11-07

## 2024-11-11 ENCOUNTER — APPOINTMENT (OUTPATIENT)
Dept: CARDIOLOGY | Facility: CLINIC | Age: 69
End: 2024-11-11
Payer: COMMERCIAL

## 2024-11-11 ENCOUNTER — APPOINTMENT (OUTPATIENT)
Dept: CARDIOLOGY | Facility: CLINIC | Age: 69
End: 2024-11-11

## 2024-11-11 PROCEDURE — 93797 PHYS/QHP OP CAR RHAB WO ECG: CPT

## 2024-11-13 ENCOUNTER — APPOINTMENT (OUTPATIENT)
Dept: CARDIOLOGY | Facility: CLINIC | Age: 69
End: 2024-11-13
Payer: COMMERCIAL

## 2024-11-13 PROCEDURE — 93797 PHYS/QHP OP CAR RHAB WO ECG: CPT

## 2024-11-14 ENCOUNTER — APPOINTMENT (OUTPATIENT)
Dept: CARDIOLOGY | Facility: CLINIC | Age: 69
End: 2024-11-14

## 2024-11-18 ENCOUNTER — APPOINTMENT (OUTPATIENT)
Dept: CARDIOLOGY | Facility: CLINIC | Age: 69
End: 2024-11-18
Payer: COMMERCIAL

## 2024-11-18 ENCOUNTER — APPOINTMENT (OUTPATIENT)
Dept: CARDIOLOGY | Facility: CLINIC | Age: 69
End: 2024-11-18

## 2024-11-18 PROCEDURE — 93797 PHYS/QHP OP CAR RHAB WO ECG: CPT

## 2024-11-20 ENCOUNTER — APPOINTMENT (OUTPATIENT)
Dept: CARDIOLOGY | Facility: CLINIC | Age: 69
End: 2024-11-20
Payer: COMMERCIAL

## 2024-11-20 PROCEDURE — 93797 PHYS/QHP OP CAR RHAB WO ECG: CPT

## 2024-11-21 ENCOUNTER — APPOINTMENT (OUTPATIENT)
Dept: CARDIOLOGY | Facility: CLINIC | Age: 69
End: 2024-11-21

## 2024-11-25 ENCOUNTER — APPOINTMENT (OUTPATIENT)
Dept: CARDIOLOGY | Facility: CLINIC | Age: 69
End: 2024-11-25

## 2024-11-27 ENCOUNTER — APPOINTMENT (OUTPATIENT)
Dept: CARDIOLOGY | Facility: CLINIC | Age: 69
End: 2024-11-27
Payer: COMMERCIAL

## 2024-11-27 PROCEDURE — 93797 PHYS/QHP OP CAR RHAB WO ECG: CPT | Mod: 95

## 2024-12-02 ENCOUNTER — APPOINTMENT (OUTPATIENT)
Dept: CARDIOLOGY | Facility: CLINIC | Age: 69
End: 2024-12-02

## 2024-12-02 ENCOUNTER — APPOINTMENT (OUTPATIENT)
Dept: CARDIOLOGY | Facility: CLINIC | Age: 69
End: 2024-12-02
Payer: COMMERCIAL

## 2024-12-02 PROCEDURE — 93797 PHYS/QHP OP CAR RHAB WO ECG: CPT | Mod: 95

## 2024-12-04 ENCOUNTER — APPOINTMENT (OUTPATIENT)
Dept: CARDIOLOGY | Facility: CLINIC | Age: 69
End: 2024-12-04

## 2024-12-05 ENCOUNTER — APPOINTMENT (OUTPATIENT)
Dept: CARDIOLOGY | Facility: CLINIC | Age: 69
End: 2024-12-05

## 2024-12-09 ENCOUNTER — APPOINTMENT (OUTPATIENT)
Dept: CARDIOLOGY | Facility: CLINIC | Age: 69
End: 2024-12-09

## 2024-12-11 ENCOUNTER — APPOINTMENT (OUTPATIENT)
Dept: CARDIOLOGY | Facility: CLINIC | Age: 69
End: 2024-12-11

## 2024-12-12 ENCOUNTER — RX RENEWAL (OUTPATIENT)
Age: 69
End: 2024-12-12

## 2024-12-12 ENCOUNTER — APPOINTMENT (OUTPATIENT)
Dept: CARDIOLOGY | Facility: CLINIC | Age: 69
End: 2024-12-12

## 2024-12-16 ENCOUNTER — APPOINTMENT (OUTPATIENT)
Dept: CARDIOLOGY | Facility: CLINIC | Age: 69
End: 2024-12-16

## 2024-12-18 ENCOUNTER — APPOINTMENT (OUTPATIENT)
Dept: CARDIOLOGY | Facility: CLINIC | Age: 69
End: 2024-12-18

## 2024-12-19 ENCOUNTER — APPOINTMENT (OUTPATIENT)
Dept: CARDIOLOGY | Facility: CLINIC | Age: 69
End: 2024-12-19

## 2024-12-23 ENCOUNTER — APPOINTMENT (OUTPATIENT)
Dept: CARDIOLOGY | Facility: CLINIC | Age: 69
End: 2024-12-23

## 2024-12-26 ENCOUNTER — APPOINTMENT (OUTPATIENT)
Dept: CARDIOLOGY | Facility: CLINIC | Age: 69
End: 2024-12-26

## 2024-12-30 ENCOUNTER — APPOINTMENT (OUTPATIENT)
Dept: CARDIOLOGY | Facility: CLINIC | Age: 69
End: 2024-12-30

## 2025-01-02 ENCOUNTER — APPOINTMENT (OUTPATIENT)
Dept: CARDIOLOGY | Facility: CLINIC | Age: 70
End: 2025-01-02

## 2025-01-06 ENCOUNTER — APPOINTMENT (OUTPATIENT)
Dept: CARDIOLOGY | Facility: CLINIC | Age: 70
End: 2025-01-06

## 2025-01-08 ENCOUNTER — APPOINTMENT (OUTPATIENT)
Dept: CARDIOLOGY | Facility: CLINIC | Age: 70
End: 2025-01-08

## 2025-01-09 ENCOUNTER — APPOINTMENT (OUTPATIENT)
Dept: CARDIOLOGY | Facility: CLINIC | Age: 70
End: 2025-01-09

## 2025-01-13 ENCOUNTER — APPOINTMENT (OUTPATIENT)
Dept: CARDIOLOGY | Facility: CLINIC | Age: 70
End: 2025-01-13

## 2025-01-15 ENCOUNTER — APPOINTMENT (OUTPATIENT)
Dept: CARDIOLOGY | Facility: CLINIC | Age: 70
End: 2025-01-15

## 2025-01-16 ENCOUNTER — APPOINTMENT (OUTPATIENT)
Dept: CARDIOLOGY | Facility: CLINIC | Age: 70
End: 2025-01-16

## 2025-01-22 ENCOUNTER — APPOINTMENT (OUTPATIENT)
Dept: CARDIOLOGY | Facility: CLINIC | Age: 70
End: 2025-01-22

## 2025-01-23 ENCOUNTER — APPOINTMENT (OUTPATIENT)
Dept: CARDIOLOGY | Facility: CLINIC | Age: 70
End: 2025-01-23

## 2025-01-27 ENCOUNTER — APPOINTMENT (OUTPATIENT)
Dept: CARDIOLOGY | Facility: CLINIC | Age: 70
End: 2025-01-27

## 2025-01-29 ENCOUNTER — APPOINTMENT (OUTPATIENT)
Dept: CARDIOLOGY | Facility: CLINIC | Age: 70
End: 2025-01-29

## 2025-02-12 ENCOUNTER — APPOINTMENT (OUTPATIENT)
Dept: CARDIOLOGY | Facility: CLINIC | Age: 70
End: 2025-02-12

## 2025-02-19 ENCOUNTER — APPOINTMENT (OUTPATIENT)
Dept: CARDIOLOGY | Facility: CLINIC | Age: 70
End: 2025-02-19

## 2025-02-26 ENCOUNTER — APPOINTMENT (OUTPATIENT)
Dept: CARDIOLOGY | Facility: CLINIC | Age: 70
End: 2025-02-26

## 2025-03-05 ENCOUNTER — APPOINTMENT (OUTPATIENT)
Dept: CARDIOLOGY | Facility: CLINIC | Age: 70
End: 2025-03-05

## 2025-03-12 ENCOUNTER — APPOINTMENT (OUTPATIENT)
Dept: CARDIOLOGY | Facility: CLINIC | Age: 70
End: 2025-03-12

## 2025-03-18 NOTE — H&P PST ADULT - GENERAL
Patient is compliant with TRT testosterone replacement policy     Last OV visit 01/21/2025          Last LABS 02/05/2025            Next scheduled OV visit 07/22/2025     Refill due 03/18/2025     Confirmed pharmacy [facility]   
details…

## 2025-03-19 ENCOUNTER — APPOINTMENT (OUTPATIENT)
Dept: CARDIOLOGY | Facility: CLINIC | Age: 70
End: 2025-03-19

## 2025-03-26 ENCOUNTER — APPOINTMENT (OUTPATIENT)
Dept: CARDIOLOGY | Facility: CLINIC | Age: 70
End: 2025-03-26

## 2025-04-02 ENCOUNTER — APPOINTMENT (OUTPATIENT)
Dept: CARDIOLOGY | Facility: CLINIC | Age: 70
End: 2025-04-02

## 2025-04-09 ENCOUNTER — APPOINTMENT (OUTPATIENT)
Dept: CARDIOLOGY | Facility: CLINIC | Age: 70
End: 2025-04-09

## 2025-04-16 ENCOUNTER — APPOINTMENT (OUTPATIENT)
Dept: CARDIOLOGY | Facility: CLINIC | Age: 70
End: 2025-04-16

## 2025-04-23 ENCOUNTER — APPOINTMENT (OUTPATIENT)
Dept: CARDIOLOGY | Facility: CLINIC | Age: 70
End: 2025-04-23

## 2025-04-23 NOTE — ASU PATIENT PROFILE, ADULT - NS PRO ABUSE SCREEN AFRAID ANYONE YN
Dizziness/Syncope:    Pt was sitting down at dinner. Pt felt a little bloated and uncomfortable (pt has taken an antacid prior to dinner). While sitting at the table pt began to feel extremely lightheaded and looked at her watch which showed rapid decline in HR from her normal 80s-90s to below 50 over the course of approx. 2 min. Pt began to see black and leaned over. About 1-2 min later the pt vomited. 5 min after vomiting the HR began to climb back up and within 15 min pt was able to stand and walk about again.   Pt states she has not taken any medication this morning and HR is currently in the low 80s.  Pt does not take   metoprolol succinate (TOPROL XL) 25 MG extended release tablet [[id number]]    Order Details  Dose: 25 mg Route: Oral Frequency: DAILY   Dispense Quantity: 90 tablet Refills: 3          Sig: Take 1 tablet by mouth daily         Start Date: 03/11/24     Regularly as it makes her tired and it difficult to work.    When did your symptoms start? Approx February she had similar episode just the one time and then nothing again until last night     Are you having chest pain - no, shortness of breath - no, vomiting - Yes, or diarrhea - no?    Is your heart racing? No    Are you taking any new medications? Not new but has been taking - claritin on and off since March -- due to heart burn before dinner last night pt did take an antacid like tums     Have you, or do you feel like you will pass out? Yes, felt like it but did not pass out    Have you ever had this before? Yes (February 2025)    [phone]               no

## 2025-04-30 ENCOUNTER — APPOINTMENT (OUTPATIENT)
Dept: CARDIOLOGY | Facility: CLINIC | Age: 70
End: 2025-04-30

## 2025-05-05 ENCOUNTER — TRANSCRIPTION ENCOUNTER (OUTPATIENT)
Age: 70
End: 2025-05-05

## 2025-05-06 ENCOUNTER — TRANSCRIPTION ENCOUNTER (OUTPATIENT)
Age: 70
End: 2025-05-06

## 2025-05-07 ENCOUNTER — RX RENEWAL (OUTPATIENT)
Age: 70
End: 2025-05-07

## 2025-05-07 ENCOUNTER — TRANSCRIPTION ENCOUNTER (OUTPATIENT)
Age: 70
End: 2025-05-07

## 2025-05-07 RX ORDER — METOPROLOL SUCCINATE 25 MG/1
25 TABLET, EXTENDED RELEASE ORAL
Qty: 90 | Refills: 3 | Status: ACTIVE | COMMUNITY
Start: 2025-05-07 | End: 1900-01-01

## 2025-05-08 ENCOUNTER — TRANSCRIPTION ENCOUNTER (OUTPATIENT)
Age: 70
End: 2025-05-08

## 2025-05-09 ENCOUNTER — NON-APPOINTMENT (OUTPATIENT)
Age: 70
End: 2025-05-09

## 2025-06-13 ENCOUNTER — APPOINTMENT (OUTPATIENT)
Dept: CARDIOLOGY | Facility: CLINIC | Age: 70
End: 2025-06-13
Payer: COMMERCIAL

## 2025-06-13 ENCOUNTER — NON-APPOINTMENT (OUTPATIENT)
Age: 70
End: 2025-06-13

## 2025-06-13 VITALS
DIASTOLIC BLOOD PRESSURE: 84 MMHG | HEIGHT: 70 IN | SYSTOLIC BLOOD PRESSURE: 110 MMHG | WEIGHT: 221 LBS | HEART RATE: 58 BPM | BODY MASS INDEX: 31.64 KG/M2

## 2025-06-13 PROCEDURE — 93000 ELECTROCARDIOGRAM COMPLETE: CPT

## 2025-06-13 PROCEDURE — G2211 COMPLEX E/M VISIT ADD ON: CPT | Mod: NC

## 2025-06-13 PROCEDURE — 99214 OFFICE O/P EST MOD 30 MIN: CPT

## 2025-06-13 RX ORDER — CLOPIDOGREL 75 MG/1
75 TABLET, FILM COATED ORAL
Refills: 0 | Status: ACTIVE | COMMUNITY

## 2025-06-13 RX ORDER — MULTIVIT-MIN/FOLIC/VIT K/LYCOP 400-300MCG
50 MCG TABLET ORAL
Refills: 0 | Status: ACTIVE | COMMUNITY

## 2025-06-13 RX ORDER — APIXABAN 5 MG/1
5 TABLET, FILM COATED ORAL
Refills: 0 | Status: ACTIVE | COMMUNITY

## 2025-07-16 LAB — APO LP(A) SERPL-MCNC: 17.3 NMOL/L

## 2025-09-03 ENCOUNTER — TRANSCRIPTION ENCOUNTER (OUTPATIENT)
Age: 70
End: 2025-09-03

## 2025-09-03 DIAGNOSIS — R06.6 HICCOUGH: ICD-10-CM

## 2025-09-03 RX ORDER — CHLORPROMAZINE HYDROCHLORIDE 25 MG/1
25 TABLET, SUGAR COATED ORAL
Qty: 60 | Refills: 3 | Status: ACTIVE | COMMUNITY
Start: 2025-09-03 | End: 1900-01-01

## 2025-09-11 ENCOUNTER — TRANSCRIPTION ENCOUNTER (OUTPATIENT)
Age: 70
End: 2025-09-11

## 2025-09-12 ENCOUNTER — TRANSCRIPTION ENCOUNTER (OUTPATIENT)
Age: 70
End: 2025-09-12